# Patient Record
Sex: MALE | Race: WHITE | NOT HISPANIC OR LATINO | Employment: UNEMPLOYED | URBAN - METROPOLITAN AREA
[De-identification: names, ages, dates, MRNs, and addresses within clinical notes are randomized per-mention and may not be internally consistent; named-entity substitution may affect disease eponyms.]

---

## 2017-07-24 RX ORDER — LISINOPRIL 20 MG/1
TABLET ORAL
Qty: 30 TAB | Refills: 0 | Status: SHIPPED | OUTPATIENT
Start: 2017-07-24 | End: 2017-08-17 | Stop reason: SDUPTHER

## 2017-08-21 RX ORDER — LISINOPRIL 20 MG/1
TABLET ORAL
Qty: 30 TAB | Refills: 0 | Status: SHIPPED | OUTPATIENT
Start: 2017-08-21 | End: 2017-08-23 | Stop reason: SDUPTHER

## 2017-08-23 ENCOUNTER — OFFICE VISIT (OUTPATIENT)
Dept: MEDICAL GROUP | Facility: MEDICAL CENTER | Age: 52
End: 2017-08-23
Attending: FAMILY MEDICINE
Payer: MEDICAID

## 2017-08-23 VITALS
HEIGHT: 67 IN | TEMPERATURE: 98.2 F | RESPIRATION RATE: 16 BRPM | WEIGHT: 224 LBS | DIASTOLIC BLOOD PRESSURE: 72 MMHG | BODY MASS INDEX: 35.16 KG/M2 | HEART RATE: 100 BPM | OXYGEN SATURATION: 93 % | SYSTOLIC BLOOD PRESSURE: 104 MMHG

## 2017-08-23 DIAGNOSIS — E78.5 HYPERLIPIDEMIA, UNSPECIFIED HYPERLIPIDEMIA TYPE: ICD-10-CM

## 2017-08-23 DIAGNOSIS — Z12.5 SCREENING PSA (PROSTATE SPECIFIC ANTIGEN): ICD-10-CM

## 2017-08-23 DIAGNOSIS — R73.9 HYPERGLYCEMIA: ICD-10-CM

## 2017-08-23 DIAGNOSIS — E55.9 VITAMIN D DEFICIENCY DISEASE: ICD-10-CM

## 2017-08-23 DIAGNOSIS — I10 ESSENTIAL HYPERTENSION: ICD-10-CM

## 2017-08-23 PROBLEM — E66.9 OBESITY (BMI 30-39.9): Status: ACTIVE | Noted: 2017-08-23

## 2017-08-23 PROCEDURE — 99213 OFFICE O/P EST LOW 20 MIN: CPT | Performed by: FAMILY MEDICINE

## 2017-08-23 PROCEDURE — 99214 OFFICE O/P EST MOD 30 MIN: CPT | Performed by: FAMILY MEDICINE

## 2017-08-23 RX ORDER — ERGOCALCIFEROL 1.25 MG/1
50000 CAPSULE ORAL
Qty: 3 CAP | Refills: 3 | Status: SHIPPED | OUTPATIENT
Start: 2017-08-23 | End: 2017-10-23 | Stop reason: SDUPTHER

## 2017-08-23 RX ORDER — LISINOPRIL 20 MG/1
TABLET ORAL
Qty: 30 TAB | Refills: 0 | Status: SHIPPED | OUTPATIENT
Start: 2017-08-23 | End: 2017-10-22 | Stop reason: SDUPTHER

## 2017-08-23 ASSESSMENT — ENCOUNTER SYMPTOMS
HEADACHES: 0
ABDOMINAL PAIN: 0
FEVER: 0
VOMITING: 0
COUGH: 0
SPUTUM PRODUCTION: 0
CHILLS: 0
NAUSEA: 0
SHORTNESS OF BREATH: 0
EYES NEGATIVE: 1
PALPITATIONS: 0

## 2017-08-23 NOTE — MR AVS SNAPSHOT
"        Ta Stokes   2017 4:50 PM   Office Visit   MRN: [id number]    [facility]   Dept Phone:  [phone]    Description:  Male : 1965   Provider:  Glenroy Cody M.D.           Reason for Visit     Orders Needed nocturnal O2 renewal      Allergies as of 2017     No Known Allergies      You were diagnosed with     BMI 34.0-34.9,adult   [541538]       Essential hypertension   [1664542]       Hyperglycemia   [992247]       Hyperlipidemia, unspecified hyperlipidemia type   [1873414]       Vitamin D deficiency disease   [316880]       Screening PSA (prostate specific antigen)   [179693]         Vital Signs     Blood Pressure Pulse Temperature Respirations Height Weight    104/72 mmHg 100 36.8 °C (98.2 °F) 16 1.714 m (5' 7.48\") 101.606 kg (224 lb)    Body Mass Index Oxygen Saturation Smoking Status             34.59 kg/m2 93% Current Every Day Smoker         Basic Information     Date Of Birth Sex Race Ethnicity Preferred Language    1965 Male White Non- English      Problem List              ICD-10-CM Priority Class Noted - Resolved    Essential hypertension I10   2016 - Present    Hyperglycemia R73.9   2016 - Present    Nocturnal hypoxia G47.34   2016 - Present    Urinary urgency R39.15   2016 - Present    Vitamin D deficiency disease E55.9   12/15/2016 - Present    Hyperlipidemia E78.5   12/15/2016 - Present    Obesity (BMI 30-39.9) E66.9   2017 - Present      Health Maintenance        Date Due Completion Dates    COLONOSCOPY 3/14/2016 ---    IMM INFLUENZA (1) 2017 10/13/2016    IMM DTaP/Tdap/Td Vaccine (2 - Td) 12/15/2026 12/15/2016            Current Immunizations     Influenza Vaccine Quad Inj (Preserved) 10/13/2016    Tdap Vaccine 12/15/2016      Below and/or attached are the medications your provider expects you to take. Review all of your home medications and newly ordered medications with your provider and/or pharmacist. Follow " medication instructions as directed by your provider and/or pharmacist. Please keep your medication list with you and share with your provider. Update the information when medications are discontinued, doses are changed, or new medications (including over-the-counter products) are added; and carry medication information at all times in the event of emergency situations     Allergies:  No Known Allergies          Medications  Valid as of: August 23, 2017 -  5:27 PM    Generic Name Brand Name Tablet Size Instructions for use    Aspirin   Take  by mouth.        Cyclobenzaprine HCl (Tab) FLEXERIL 10 MG Take 10 mg by mouth 3 times a day as needed.        Ergocalciferol (Cap) DRISDOL 84198 units Take 1 Cap by mouth Q30 DAYS.        Lisinopril (Tab) PRINIVIL 20 MG TAKE ONE TABLET BY MOUTH ONCE DAILY        Misc. Devices (Misc) Misc. Devices  Overnight oximetry to recheck nocturnal hypoxia, concentrator to keep O2 above 90%        Misc. Devices (Misc) Misc. Devices  Overnight oximetry, O2 concentrator to keep nocturnal O2 sats > 92%        Nicotine (PATCH 24 HR) NICODERM 14 MG/24HR Apply 1 Patch to skin as directed every 24 hours.        Nicotine (PATCH 24 HR) NICODERM 21 MG/24HR Apply 1 Patch to skin as directed every 24 hours.        Nicotine (PATCH 24 HR) NICODERM 7 MG/24HR Apply 1 Patch to skin as directed every 24 hours.        .                 Medicines prescribed today were sent to:     [facility] [address] [place] [id number]    Phone: [phone] Fax: [phone]    Open 24 Hours?: No      Medication refill instructions:       If your prescription bottle indicates you have medication refills left, it is not necessary to call your provider’s office. Please contact your pharmacy and they will refill your medication.    If your prescription bottle indicates you do not have any refills left, you may request refills at any time through one of the following  ways: The online [employer] system (except Urgent Care), by calling your provider’s office, or by asking your pharmacy to contact your provider’s office with a refill request. Medication refills are processed only during regular business hours and may not be available until the next business day. Your provider may request additional information or to have a follow-up visit with you prior to refilling your medication.   *Please Note: Medication refills are assigned a new Rx number when refilled electronically. Your pharmacy may indicate that no refills were authorized even though a new prescription for the same medication is available at the pharmacy. Please request the medicine by name with the pharmacy before contacting your provider for a refill.        Your To Do List     Future Labs/Procedures Complete By Expires    CBC WITH DIFFERENTIAL  As directed 8/23/2018    COMP METABOLIC PANEL  As directed 8/23/2018    LIPID PROFILE  As directed 8/23/2018    PROSTATE SPECIFIC AG SCREENING  As directed 8/23/2018    TSH WITH REFLEX TO FT4  As directed 8/23/2018    VITAMIN D,25 HYDROXY  As directed 8/23/2018      Referral     A referral request has been sent to our patient care coordination department. Please allow 3-5 business days for us to process this request and contact you either by phone or mail. If you do not hear from us by the 5th business day, please call us at [phone].           [employer] Access Code: Activation code not generated  Current [employer] Status: Active          Quit Tobacco Information     Do you want to quit using tobacco?    Quitting tobacco decreases risks of cancer, heart and lung disease, increases life expectancy, improves sense of taste and smell, and increases spending money, among other benefits.    If you are thinking about quitting, we can help.  • [facility] Quit Tobacco Program: [phone]  o Program occurs weekly for four weeks and includes pharmacist consultation on products to support  quitting smoking or chewing tobacco. A provider referral is needed for pharmacist consultation.  • Tobacco Users Help Hotline: 3-143-QUIT-NOW (737-1124) or https://[place].quitlogix.org/  o Free, confidential telephone and online coaching for [place] residents. Sessions are designed on a schedule that is convenient for you. Eligible clients receive free nicotine replacement therapy.  • Nationally: www.smokefree.gov  o Information and professional assistance to support both immediate and long-term needs as you become, and remain, a non-smoker. Smokefree.gov allows you to choose the help that best fits your needs.

## 2017-08-24 NOTE — PROGRESS NOTES
Subjective:      Ta Chris Stokes is a 52 y.o. male who presents with Orders Needed            HPI Comments: Patient 52-year-old male here to reestablish with the clinic today. He has a history of hypertension, nocturnal hypoxemia, vitamin D deficiency, hyperlipidemia, and hyperglycemia.    Patient appears well-controlled on his blood pressure today. He has been taking his medication as directed. He will need his blood pressure medications refilled today. He is not having any chest pain or shortness of breath or other neurologic changes. Patient has been advised to check his blood pressure at home and keep notes. ER precautions have also been given to patient.    We'll reorder blood work to recheck his lipid panel, metabolic panel, thyroid function and complete blood count. He also has a history of vitamin D deficiency so will order a vitamin D level to recheck his levels. We'll also have him continue to use his vitamin D supplements as directed. We'll continue to follow.    Patient states that he had been following up with his sleep specialist. But his sleep specialist recently closed his practice. So he will need to be referred to another specialist. Patient states that he needs in order to continue his nocturnal concentrator. We'll also order a overnight oximetry study to recheck his oxygen levels nocturnally. We'll continue to follow.    Patient also reports that he has stopped smoking and is no longer using his nicotine patches. We'll continue to follow.     Current medications, allergies, and problem list reviewed with patient and updated in EPIC.          Review of Systems   Constitutional: Negative for fever and chills.   HENT: Negative for hearing loss.    Eyes: Negative.    Respiratory: Negative for cough, sputum production and shortness of breath.    Cardiovascular: Negative for chest pain and palpitations.   Gastrointestinal: Negative for nausea, vomiting and abdominal pain.   Neurological: Negative for  "headaches.          Objective:     /72 mmHg  Pulse 100  Temp(Src) 36.8 °C (98.2 °F)  Resp 16  Ht 1.714 m (5' 7.48\")  Wt 101.606 kg (224 lb)  BMI 34.59 kg/m2  SpO2 93%     Physical Exam   Constitutional: He is oriented to person, place, and time. He appears well-developed and well-nourished.   HENT:   Right Ear: External ear normal.   Left Ear: External ear normal.   Nose: Nose normal.   Mouth/Throat: Oropharynx is clear and moist.   Eyes: EOM are normal. Pupils are equal, round, and reactive to light.   Neck: Normal range of motion.   Cardiovascular: Normal rate, regular rhythm and normal heart sounds.  Exam reveals no friction rub.    No murmur heard.  Pulmonary/Chest: Effort normal and breath sounds normal. No respiratory distress. He has no wheezes. He has no rales.   Abdominal: Soft. Bowel sounds are normal.   Neurological: He is alert and oriented to person, place, and time.   Skin: Skin is warm and dry.   Psychiatric: He has a normal mood and affect. His behavior is normal.               Assessment/Plan:     1. BMI 34.0-34.9,adult  Discussed diet and exercise to help manage his BMI, blood sugars and cholesterol levels. We'll continue to follow.  - Patient identified as having weight management issue.  Appropriate orders and counseling given.  - REFERRAL TO SLEEP STUDIES    2. Essential hypertension  We'll refill his medication today. Have him continue to take it as directed. He has been advised to monitor blood pressure at home and keep notes. If blood pressure elevated or having symptoms of CP, SOB or neurologic changes to go to the er.    - REFERRAL TO SLEEP STUDIES  - TSH WITH REFLEX TO FT4; Future  - COMP METABOLIC PANEL; Future  - LIPID PROFILE; Future  - CBC WITH DIFFERENTIAL; Future    3. Hyperglycemia  We'll recheck his metabolic function as well as thyroid function and will continue to follow.  - TSH WITH REFLEX TO FT4; Future  - COMP METABOLIC PANEL; Future  - LIPID PROFILE; Future  - " CBC WITH DIFFERENTIAL; Future  - vitamin D, Ergocalciferol, (DRISDOL) 58519 units Cap capsule; Take 1 Cap by mouth Q30 DAYS.  Dispense: 3 Cap; Refill: 3    4. Hyperlipidemia, unspecified hyperlipidemia type  He has been advised to increase the fibers in his diet, avoid fatty/fried foods. Also advised to exercise as tolerated.     - TSH WITH REFLEX TO FT4; Future  - COMP METABOLIC PANEL; Future  - LIPID PROFILE; Future  - CBC WITH DIFFERENTIAL; Future    5. Vitamin D deficiency disease  We'll have him continue to use his vitamin D as directed. We'll recheck his vitamin D levels will continue to follow.  - VITAMIN D,25 HYDROXY; Future    6. Screening PSA (prostate specific antigen)  We'll reorder his PSA screen. Discussed referral to urology if his PSA levels are elevated.  - PROSTATE SPECIFIC AG SCREENING; Future

## 2017-08-30 ENCOUNTER — HOSPITAL ENCOUNTER (OUTPATIENT)
Dept: LAB | Facility: MEDICAL CENTER | Age: 52
End: 2017-08-30
Attending: FAMILY MEDICINE
Payer: MEDICAID

## 2017-08-30 DIAGNOSIS — R73.9 HYPERGLYCEMIA: ICD-10-CM

## 2017-08-30 DIAGNOSIS — I10 ESSENTIAL HYPERTENSION: ICD-10-CM

## 2017-08-30 DIAGNOSIS — Z12.5 SCREENING PSA (PROSTATE SPECIFIC ANTIGEN): ICD-10-CM

## 2017-08-30 DIAGNOSIS — E78.5 HYPERLIPIDEMIA, UNSPECIFIED HYPERLIPIDEMIA TYPE: ICD-10-CM

## 2017-08-30 DIAGNOSIS — E55.9 VITAMIN D DEFICIENCY DISEASE: ICD-10-CM

## 2017-08-30 LAB
ALBUMIN SERPL BCP-MCNC: 4.1 G/DL (ref 3.2–4.9)
ALBUMIN/GLOB SERPL: 1.5 G/DL
ALP SERPL-CCNC: 52 U/L (ref 30–99)
ALT SERPL-CCNC: 25 U/L (ref 2–50)
ANION GAP SERPL CALC-SCNC: 9 MMOL/L (ref 0–11.9)
AST SERPL-CCNC: 16 U/L (ref 12–45)
BASOPHILS # BLD AUTO: 0.5 % (ref 0–1.8)
BASOPHILS # BLD: 0.03 K/UL (ref 0–0.12)
BILIRUB SERPL-MCNC: 0.7 MG/DL (ref 0.1–1.5)
BUN SERPL-MCNC: 19 MG/DL (ref 8–22)
CALCIUM SERPL-MCNC: 9.3 MG/DL (ref 8.5–10.5)
CHLORIDE SERPL-SCNC: 103 MMOL/L (ref 96–112)
CHOLEST SERPL-MCNC: 189 MG/DL (ref 100–199)
CO2 SERPL-SCNC: 23 MMOL/L (ref 20–33)
CREAT SERPL-MCNC: 0.92 MG/DL (ref 0.5–1.4)
EOSINOPHIL # BLD AUTO: 0.21 K/UL (ref 0–0.51)
EOSINOPHIL NFR BLD: 3.3 % (ref 0–6.9)
ERYTHROCYTE [DISTWIDTH] IN BLOOD BY AUTOMATED COUNT: 44.1 FL (ref 35.9–50)
GFR SERPL CREATININE-BSD FRML MDRD: >60 ML/MIN/1.73 M 2
GLOBULIN SER CALC-MCNC: 2.7 G/DL (ref 1.9–3.5)
GLUCOSE SERPL-MCNC: 103 MG/DL (ref 65–99)
HCT VFR BLD AUTO: 48.5 % (ref 42–52)
HDLC SERPL-MCNC: 45 MG/DL
HGB BLD-MCNC: 15.9 G/DL (ref 14–18)
IMM GRANULOCYTES # BLD AUTO: 0.02 K/UL (ref 0–0.11)
IMM GRANULOCYTES NFR BLD AUTO: 0.3 % (ref 0–0.9)
LDLC SERPL CALC-MCNC: 114 MG/DL
LYMPHOCYTES # BLD AUTO: 2.43 K/UL (ref 1–4.8)
LYMPHOCYTES NFR BLD: 38.2 % (ref 22–41)
MCH RBC QN AUTO: 29.7 PG (ref 27–33)
MCHC RBC AUTO-ENTMCNC: 32.8 G/DL (ref 33.7–35.3)
MCV RBC AUTO: 90.5 FL (ref 81.4–97.8)
MONOCYTES # BLD AUTO: 0.41 K/UL (ref 0–0.85)
MONOCYTES NFR BLD AUTO: 6.4 % (ref 0–13.4)
NEUTROPHILS # BLD AUTO: 3.26 K/UL (ref 1.82–7.42)
NEUTROPHILS NFR BLD: 51.3 % (ref 44–72)
NRBC # BLD AUTO: 0 K/UL
NRBC BLD AUTO-RTO: 0 /100 WBC
PLATELET # BLD AUTO: 231 K/UL (ref 164–446)
PMV BLD AUTO: 9.8 FL (ref 9–12.9)
POTASSIUM SERPL-SCNC: 4.4 MMOL/L (ref 3.6–5.5)
PROT SERPL-MCNC: 6.8 G/DL (ref 6–8.2)
RBC # BLD AUTO: 5.36 M/UL (ref 4.7–6.1)
SODIUM SERPL-SCNC: 135 MMOL/L (ref 135–145)
TRIGL SERPL-MCNC: 150 MG/DL (ref 0–149)
WBC # BLD AUTO: 6.4 K/UL (ref 4.8–10.8)

## 2017-08-30 PROCEDURE — 84443 ASSAY THYROID STIM HORMONE: CPT

## 2017-08-30 PROCEDURE — 82306 VITAMIN D 25 HYDROXY: CPT

## 2017-08-30 PROCEDURE — 84153 ASSAY OF PSA TOTAL: CPT

## 2017-08-30 PROCEDURE — 36415 COLL VENOUS BLD VENIPUNCTURE: CPT

## 2017-08-30 PROCEDURE — 80053 COMPREHEN METABOLIC PANEL: CPT

## 2017-08-30 PROCEDURE — 85025 COMPLETE CBC W/AUTO DIFF WBC: CPT

## 2017-08-30 PROCEDURE — 80061 LIPID PANEL: CPT

## 2017-08-31 LAB
25(OH)D3 SERPL-MCNC: 30 NG/ML (ref 30–100)
PSA SERPL-MCNC: 1.81 NG/ML (ref 0–4)
TSH SERPL DL<=0.005 MIU/L-ACNC: 2.69 UIU/ML (ref 0.3–3.7)

## 2017-09-06 ENCOUNTER — OFFICE VISIT (OUTPATIENT)
Dept: MEDICAL GROUP | Facility: MEDICAL CENTER | Age: 52
End: 2017-09-06
Attending: FAMILY MEDICINE
Payer: MEDICAID

## 2017-09-06 VITALS
HEIGHT: 67 IN | HEART RATE: 88 BPM | RESPIRATION RATE: 16 BRPM | BODY MASS INDEX: 35.79 KG/M2 | WEIGHT: 228 LBS | SYSTOLIC BLOOD PRESSURE: 115 MMHG | OXYGEN SATURATION: 94 % | DIASTOLIC BLOOD PRESSURE: 66 MMHG | TEMPERATURE: 98.3 F

## 2017-09-06 DIAGNOSIS — G47.34 NOCTURNAL HYPOXIA: ICD-10-CM

## 2017-09-06 DIAGNOSIS — Z23 NEED FOR PNEUMOCOCCAL VACCINATION: ICD-10-CM

## 2017-09-06 DIAGNOSIS — E78.5 HYPERLIPIDEMIA, UNSPECIFIED HYPERLIPIDEMIA TYPE: ICD-10-CM

## 2017-09-06 DIAGNOSIS — I10 ESSENTIAL HYPERTENSION: ICD-10-CM

## 2017-09-06 DIAGNOSIS — Z23 NEED FOR INFLUENZA VACCINATION: ICD-10-CM

## 2017-09-06 DIAGNOSIS — E55.9 VITAMIN D DEFICIENCY DISEASE: ICD-10-CM

## 2017-09-06 PROCEDURE — 90686 IIV4 VACC NO PRSV 0.5 ML IM: CPT | Performed by: FAMILY MEDICINE

## 2017-09-06 PROCEDURE — 99214 OFFICE O/P EST MOD 30 MIN: CPT | Mod: 25 | Performed by: FAMILY MEDICINE

## 2017-09-06 PROCEDURE — 90732 PPSV23 VACC 2 YRS+ SUBQ/IM: CPT | Performed by: FAMILY MEDICINE

## 2017-09-06 PROCEDURE — 99213 OFFICE O/P EST LOW 20 MIN: CPT | Performed by: FAMILY MEDICINE

## 2017-09-06 PROCEDURE — 90471 IMMUNIZATION ADMIN: CPT | Performed by: FAMILY MEDICINE

## 2017-09-06 ASSESSMENT — ENCOUNTER SYMPTOMS
PALPITATIONS: 0
ABDOMINAL PAIN: 0
SHORTNESS OF BREATH: 0
SPUTUM PRODUCTION: 0
FEVER: 0
CHILLS: 0
NAUSEA: 0
VOMITING: 0
HEADACHES: 0
COUGH: 0

## 2017-09-06 NOTE — PROGRESS NOTES
"Subjective:      Ta Stokes is a 52 y.o. male who presents with Results (labs) and Flu Vaccine            Patient here for follow-up of recent blood work. Has a history of hyperlipidemia, hypertension, nocturnal hypoxia and vitamin D deficiency.    The results of his recent blood work showed his cholesterol to be slightly elevated but improved from his previous test. The results are as follows:    Cholesterol,Tot: 189  Triglycerides: 150 (H)  HDL: 45  LDL: 114 (H)    Discussed diet and exercise to help manage his cholesterol as well as his weight. Discussed low-fat diet with high-fiber and exercising as tolerated at least 20-30 minutes at a time 4-5 times weekly. To do to follow.    We'll have patient continue to take his blood pressure medication as directed. He is not having any chest pain or shortness of breath. We'll have him check his blood pressures at home and keep records. We'll continue to follow.    We'll have him continue to take his vitamin D as directed. His last vitamin D level was at 30. I will continue to follow.    In order for an overnight oximetry test had been sent to his [employer] supplier. He was never contacted and never received the test yet so an order will be resent. We'll continue to follow.     Current medications, allergies, and problem list reviewed with patient and updated in [employer].                Review of Systems   Constitutional: Negative for chills and fever.   HENT: Negative for hearing loss.    Respiratory: Negative for cough, sputum production and shortness of breath.    Cardiovascular: Negative for chest pain and palpitations.   Gastrointestinal: Negative for abdominal pain, nausea and vomiting.   Neurological: Negative for headaches.          Objective:     /66   Pulse 88   Temp 36.8 °C (98.3 °F)   Resp 16   Ht 1.714 m (5' 7.48\")   Wt 103.4 kg (228 lb)   SpO2 94%   BMI 35.20 kg/m²      Physical Exam   Constitutional: He is oriented to person, " place, and time. He appears well-developed and well-nourished.   HENT:   Head: Normocephalic and atraumatic.   Nose: Nose normal.   Mouth/Throat: Oropharynx is clear and moist.   Neck: No thyromegaly present.   Cardiovascular: Normal rate, regular rhythm and normal heart sounds.  Exam reveals no friction rub.    No murmur heard.  Pulmonary/Chest: Effort normal and breath sounds normal. No respiratory distress. He has no wheezes.   Abdominal: Soft. Bowel sounds are normal. He exhibits no distension. There is no tenderness. There is no guarding.   Neurological: He is alert and oriented to person, place, and time.   Skin: Skin is warm and dry.   Psychiatric: He has a normal mood and affect. His behavior is normal.   Nursing note and vitals reviewed.              Assessment/Plan:     1. Need for pneumococcal vaccination  We'll have patient get a pneumococcal vaccine today. Risks and benefits of the vaccine have been discussed with patient.  - Pneumococal Polysaccharide Vaccine 23-Valent =>1yo SQ/IM    2. Need for influenza vaccination  He would like to get a flu shot, he understands the risks and benefits of the flu shot. No recent fevers, no egg allergies, and no hx of GBS.    - Flu Quad Inj >3 Year Pre-Filled (Preservative Free)    3. Hyperlipidemia, unspecified hyperlipidemia type  Discussed the results of his recent lipid panel. He has been advised to increase the fibers in his diet, avoid fatty/fried foods. Also advised to exercise as tolerated.       4. Essential hypertension  Will have patient patient continue to take his medication as directed. He has been advised to monitor blood pressure at home and keep notes. If blood pressure elevated or having symptoms of CP, SOB or neurologic changes to go to the er.      5. Vitamin D deficiency disease  We'll have patient continue to take his vitamin D as directed. We'll continue to monitor.    6. Nocturnal hypoxia  We'll resend an order for an overnight oximetry study.  CONTINUE to follow.

## 2017-10-23 DIAGNOSIS — R73.9 HYPERGLYCEMIA: ICD-10-CM

## 2017-10-23 RX ORDER — LISINOPRIL 20 MG/1
TABLET ORAL
Qty: 90 TAB | Refills: 1 | Status: SHIPPED | OUTPATIENT
Start: 2017-10-23 | End: 2017-10-24 | Stop reason: SDUPTHER

## 2017-10-23 RX ORDER — ERGOCALCIFEROL 1.25 MG/1
50000 CAPSULE ORAL
Qty: 3 CAP | Refills: 3 | Status: SHIPPED | OUTPATIENT
Start: 2017-10-23 | End: 2017-10-24 | Stop reason: SDUPTHER

## 2017-10-24 DIAGNOSIS — R73.9 HYPERGLYCEMIA: ICD-10-CM

## 2017-10-24 RX ORDER — LISINOPRIL 20 MG/1
TABLET ORAL
Qty: 90 TAB | Refills: 1 | Status: SHIPPED | OUTPATIENT
Start: 2017-10-24 | End: 2018-04-09 | Stop reason: SDUPTHER

## 2017-10-24 RX ORDER — ERGOCALCIFEROL 1.25 MG/1
50000 CAPSULE ORAL
Qty: 3 CAP | Refills: 3 | Status: SHIPPED | OUTPATIENT
Start: 2017-10-24 | End: 2018-04-18 | Stop reason: SDUPTHER

## 2018-04-09 ENCOUNTER — TELEPHONE (OUTPATIENT)
Dept: MEDICAL GROUP | Facility: MEDICAL CENTER | Age: 53
End: 2018-04-09

## 2018-04-09 DIAGNOSIS — I10 ESSENTIAL HYPERTENSION: ICD-10-CM

## 2018-04-09 RX ORDER — LISINOPRIL 20 MG/1
TABLET ORAL
Qty: 90 TAB | Refills: 1 | Status: SHIPPED | OUTPATIENT
Start: 2018-04-09 | End: 2018-04-18 | Stop reason: SDUPTHER

## 2018-04-18 ENCOUNTER — PATIENT MESSAGE (OUTPATIENT)
Dept: MEDICAL GROUP | Facility: MEDICAL CENTER | Age: 53
End: 2018-04-18

## 2018-04-18 DIAGNOSIS — I10 ESSENTIAL HYPERTENSION: ICD-10-CM

## 2018-04-18 DIAGNOSIS — R73.9 HYPERGLYCEMIA: ICD-10-CM

## 2018-04-18 RX ORDER — ERGOCALCIFEROL 1.25 MG/1
50000 CAPSULE ORAL
Qty: 3 CAP | Refills: 3 | Status: SHIPPED | OUTPATIENT
Start: 2018-04-18 | End: 2018-09-24 | Stop reason: SDUPTHER

## 2018-04-18 RX ORDER — LISINOPRIL 20 MG/1
TABLET ORAL
Qty: 90 TAB | Refills: 1 | Status: SHIPPED | OUTPATIENT
Start: 2018-04-18 | End: 2018-09-24 | Stop reason: SDUPTHER

## 2018-09-24 ENCOUNTER — OFFICE VISIT (OUTPATIENT)
Dept: MEDICAL GROUP | Facility: MEDICAL CENTER | Age: 53
End: 2018-09-24
Attending: FAMILY MEDICINE
Payer: MEDICAID

## 2018-09-24 VITALS
DIASTOLIC BLOOD PRESSURE: 80 MMHG | HEIGHT: 67 IN | SYSTOLIC BLOOD PRESSURE: 125 MMHG | OXYGEN SATURATION: 94 % | BODY MASS INDEX: 35.31 KG/M2 | HEART RATE: 96 BPM | TEMPERATURE: 98.5 F | RESPIRATION RATE: 16 BRPM | WEIGHT: 225 LBS

## 2018-09-24 DIAGNOSIS — E78.5 HYPERLIPIDEMIA, UNSPECIFIED HYPERLIPIDEMIA TYPE: ICD-10-CM

## 2018-09-24 DIAGNOSIS — I10 ESSENTIAL HYPERTENSION: ICD-10-CM

## 2018-09-24 DIAGNOSIS — E55.9 VITAMIN D DEFICIENCY DISEASE: ICD-10-CM

## 2018-09-24 DIAGNOSIS — Z12.5 SCREENING PSA (PROSTATE SPECIFIC ANTIGEN): ICD-10-CM

## 2018-09-24 DIAGNOSIS — R73.9 HYPERGLYCEMIA: ICD-10-CM

## 2018-09-24 DIAGNOSIS — Z23 FLU VACCINE NEED: ICD-10-CM

## 2018-09-24 DIAGNOSIS — Z12.11 COLON CANCER SCREENING: ICD-10-CM

## 2018-09-24 PROCEDURE — 99214 OFFICE O/P EST MOD 30 MIN: CPT | Performed by: FAMILY MEDICINE

## 2018-09-24 PROCEDURE — 99212 OFFICE O/P EST SF 10 MIN: CPT | Performed by: FAMILY MEDICINE

## 2018-09-24 PROCEDURE — 90686 IIV4 VACC NO PRSV 0.5 ML IM: CPT

## 2018-09-24 RX ORDER — LISINOPRIL 20 MG/1
TABLET ORAL
Qty: 90 TAB | Refills: 1 | Status: SHIPPED | OUTPATIENT
Start: 2018-09-24 | End: 2019-04-15 | Stop reason: SDUPTHER

## 2018-09-24 RX ORDER — ERGOCALCIFEROL 1.25 MG/1
50000 CAPSULE ORAL
Qty: 3 CAP | Refills: 3 | Status: SHIPPED | OUTPATIENT
Start: 2018-09-24 | End: 2019-09-05 | Stop reason: SDUPTHER

## 2018-09-24 ASSESSMENT — ENCOUNTER SYMPTOMS
VOMITING: 0
PSYCHIATRIC NEGATIVE: 1
NAUSEA: 0
ABDOMINAL PAIN: 0
MUSCULOSKELETAL NEGATIVE: 1
SPUTUM PRODUCTION: 0
CHILLS: 0
NEUROLOGICAL NEGATIVE: 1
PALPITATIONS: 0
FEVER: 0
SHORTNESS OF BREATH: 0
COUGH: 0

## 2018-09-24 ASSESSMENT — PATIENT HEALTH QUESTIONNAIRE - PHQ9: CLINICAL INTERPRETATION OF PHQ2 SCORE: 0

## 2018-09-24 NOTE — PROGRESS NOTES
Subjective:      aT Stokes is a 53 y.o. male who presents with Medication Refill (vitamin d, lisinopril) and Immunizations (flu shot)            Patient 53-year-old male here for a follow-up of his hypertension, vitamin D deficiency, hyperglycemia, hyperlipidemia and elevated BMI.    He has been taking his blood pressure medication as directed and his blood pressure appears to be well managed with his current medication. He is not having any chest pain, shortness of breath or other neurologic or cardiac symptoms. We will have him continue to check his blood pressure at home and keep notes.    His last cholesterol was mostly within normal limits. He had a normal total cholesterol that is slightly elevated LDL cholesterol level. Will have him continue to watch his diet avoiding fatty and fried foods. Also recommended exercising 4-5 times weekly for 20-30 minutes at a time. We'll continue to follow.    Will reorder blood work to recheck his metabolic function, lipid panel, complete blood count and vitamin D level.  His last PSA was slightly elevated but below the upper limits of normal. We'll continue to follow.    A fit screen was also ordered for patient today. Discussed the need for a colonoscopy if his fit screen is positive. We will continue to follow.    He'll also be getting his flu vaccine today. He is not allergic to eggs has no history of GBS and no recent fevers. We'll continue to follow.     Current medications, allergies, and problem list reviewed with patient and updated in EPIC.          Review of Systems   Constitutional: Negative for chills and fever.   HENT: Negative for hearing loss and tinnitus.    Respiratory: Negative for cough, sputum production and shortness of breath.    Cardiovascular: Negative for chest pain and palpitations.   Gastrointestinal: Negative for abdominal pain, nausea and vomiting.   Musculoskeletal: Negative.    Skin: Negative for rash.   Neurological: Negative.   "  Psychiatric/Behavioral: Negative.           Objective:     /80 (BP Location: Left arm, Patient Position: Sitting)   Pulse 96   Temp 36.9 °C (98.5 °F)   Resp 16   Ht 1.702 m (5' 7\")   Wt 102.1 kg (225 lb)   SpO2 94%   BMI 35.24 kg/m²      Physical Exam   Constitutional: He is oriented to person, place, and time.   BMI 35   HENT:   Head: Normocephalic and atraumatic.   Nose: Nose normal.   Mouth/Throat: Oropharynx is clear and moist.   Eyes: Pupils are equal, round, and reactive to light. Conjunctivae and EOM are normal.   Neck: Normal range of motion. Neck supple. No thyromegaly present.   Cardiovascular: Normal rate, regular rhythm and normal heart sounds.  Exam reveals no friction rub.    No murmur heard.  Pulmonary/Chest: Effort normal and breath sounds normal. No respiratory distress. He has no wheezes. He has no rales.   Abdominal: Soft. Bowel sounds are normal. He exhibits no distension. There is no tenderness.   Musculoskeletal: Normal range of motion.   Lymphadenopathy:     He has no cervical adenopathy.   Neurological: He is alert and oriented to person, place, and time.   Skin: Skin is dry.   Psychiatric: He has a normal mood and affect. His behavior is normal.   Nursing note and vitals reviewed.              Assessment/Plan:     1. Essential hypertension  Will send a refill of his medication to his pharmacy for him to continue taking his recommended. He has been advised to monitor blood pressure at home and keep notes. If blood pressure elevated or having symptoms of CP, SOB or neurologic changes to go to the er.    - lisinopril (PRINIVIL) 20 MG Tab; TAKE ONE TABLET BY MOUTH ONCE DAILY  Dispense: 90 Tab; Refill: 1  - COMP METABOLIC PANEL; Future  - CBC WITH DIFFERENTIAL; Future    2. Hyperglycemia  Recheck his metabolic function. We'll continue to follow.  - vitamin D, Ergocalciferol, (DRISDOL) 93066 units Cap capsule; Take 1 Cap by mouth Q30 DAYS.  Dispense: 3 Cap; Refill: 3    3. " Hyperlipidemia, unspecified hyperlipidemia type  He has been advised to increase the fibers in his diet, avoid fatty/fried foods. Also advised to exercise as tolerated.     - COMP METABOLIC PANEL; Future  - LIPID PROFILE; Future    4. Screening PSA (prostate specific antigen)  Will order a PSA for further screening. We'll continue to follow.  - PROSTATE SPECIFIC AG SCREENING; Future    5. Vitamin D deficiency disease  Will have him continue to take his vitamin D supplements as directed. Will recheck his vitamin D levels. We'll continue to follow.  - VITAMIN D,25 HYDROXY; Future    6. BMI 35.0-35.9,adult  Diet and exercise discussed to manage his PMI.  - Patient identified as having weight management issue.  Appropriate orders and counseling given.    7. Colon cancer screening  Fit screen has been ordered for colon cancer screening. We'll continue to follow.  - OCCULT BLOOD FECES IMMUNOASSAY; Future

## 2018-09-27 ENCOUNTER — HOSPITAL ENCOUNTER (OUTPATIENT)
Facility: MEDICAL CENTER | Age: 53
End: 2018-09-27
Attending: FAMILY MEDICINE
Payer: MEDICAID

## 2018-09-27 PROCEDURE — 82274 ASSAY TEST FOR BLOOD FECAL: CPT

## 2018-09-29 DIAGNOSIS — Z12.11 COLON CANCER SCREENING: ICD-10-CM

## 2018-10-01 LAB — HEMOCCULT STL QL IA: NEGATIVE

## 2018-10-02 ENCOUNTER — HOSPITAL ENCOUNTER (OUTPATIENT)
Dept: LAB | Facility: MEDICAL CENTER | Age: 53
End: 2018-10-02
Attending: FAMILY MEDICINE
Payer: MEDICAID

## 2018-10-02 DIAGNOSIS — Z12.5 SCREENING PSA (PROSTATE SPECIFIC ANTIGEN): ICD-10-CM

## 2018-10-02 DIAGNOSIS — I10 ESSENTIAL HYPERTENSION: ICD-10-CM

## 2018-10-02 DIAGNOSIS — E78.5 HYPERLIPIDEMIA, UNSPECIFIED HYPERLIPIDEMIA TYPE: ICD-10-CM

## 2018-10-02 DIAGNOSIS — E55.9 VITAMIN D DEFICIENCY DISEASE: ICD-10-CM

## 2018-10-02 LAB
25(OH)D3 SERPL-MCNC: 20 NG/ML (ref 30–100)
ALBUMIN SERPL BCP-MCNC: 3.7 G/DL (ref 3.2–4.9)
ALBUMIN/GLOB SERPL: 1.4 G/DL
ALP SERPL-CCNC: 53 U/L (ref 30–99)
ALT SERPL-CCNC: 26 U/L (ref 2–50)
ANION GAP SERPL CALC-SCNC: 7 MMOL/L (ref 0–11.9)
AST SERPL-CCNC: 16 U/L (ref 12–45)
BASOPHILS # BLD AUTO: 0.4 % (ref 0–1.8)
BASOPHILS # BLD: 0.02 K/UL (ref 0–0.12)
BILIRUB SERPL-MCNC: 0.6 MG/DL (ref 0.1–1.5)
BUN SERPL-MCNC: 18 MG/DL (ref 8–22)
CALCIUM SERPL-MCNC: 8.9 MG/DL (ref 8.5–10.5)
CHLORIDE SERPL-SCNC: 107 MMOL/L (ref 96–112)
CHOLEST SERPL-MCNC: 174 MG/DL (ref 100–199)
CO2 SERPL-SCNC: 24 MMOL/L (ref 20–33)
CREAT SERPL-MCNC: 0.88 MG/DL (ref 0.5–1.4)
EOSINOPHIL # BLD AUTO: 0.21 K/UL (ref 0–0.51)
EOSINOPHIL NFR BLD: 4.1 % (ref 0–6.9)
ERYTHROCYTE [DISTWIDTH] IN BLOOD BY AUTOMATED COUNT: 42.9 FL (ref 35.9–50)
FASTING STATUS PATIENT QL REPORTED: NORMAL
GLOBULIN SER CALC-MCNC: 2.7 G/DL (ref 1.9–3.5)
GLUCOSE SERPL-MCNC: 105 MG/DL (ref 65–99)
HCT VFR BLD AUTO: 43.7 % (ref 42–52)
HDLC SERPL-MCNC: 36 MG/DL
HGB BLD-MCNC: 14.7 G/DL (ref 14–18)
IMM GRANULOCYTES # BLD AUTO: 0.01 K/UL (ref 0–0.11)
IMM GRANULOCYTES NFR BLD AUTO: 0.2 % (ref 0–0.9)
LDLC SERPL CALC-MCNC: 117 MG/DL
LYMPHOCYTES # BLD AUTO: 2 K/UL (ref 1–4.8)
LYMPHOCYTES NFR BLD: 39 % (ref 22–41)
MCH RBC QN AUTO: 30.6 PG (ref 27–33)
MCHC RBC AUTO-ENTMCNC: 33.6 G/DL (ref 33.7–35.3)
MCV RBC AUTO: 90.9 FL (ref 81.4–97.8)
MONOCYTES # BLD AUTO: 0.33 K/UL (ref 0–0.85)
MONOCYTES NFR BLD AUTO: 6.4 % (ref 0–13.4)
NEUTROPHILS # BLD AUTO: 2.56 K/UL (ref 1.82–7.42)
NEUTROPHILS NFR BLD: 49.9 % (ref 44–72)
NRBC # BLD AUTO: 0 K/UL
NRBC BLD-RTO: 0 /100 WBC
PLATELET # BLD AUTO: 232 K/UL (ref 164–446)
PMV BLD AUTO: 9.9 FL (ref 9–12.9)
POTASSIUM SERPL-SCNC: 3.9 MMOL/L (ref 3.6–5.5)
PROT SERPL-MCNC: 6.4 G/DL (ref 6–8.2)
PSA SERPL-MCNC: 2.23 NG/ML (ref 0–4)
RBC # BLD AUTO: 4.81 M/UL (ref 4.7–6.1)
SODIUM SERPL-SCNC: 138 MMOL/L (ref 135–145)
TRIGL SERPL-MCNC: 103 MG/DL (ref 0–149)
WBC # BLD AUTO: 5.1 K/UL (ref 4.8–10.8)

## 2018-10-02 PROCEDURE — 82306 VITAMIN D 25 HYDROXY: CPT

## 2018-10-02 PROCEDURE — 80053 COMPREHEN METABOLIC PANEL: CPT

## 2018-10-02 PROCEDURE — 36415 COLL VENOUS BLD VENIPUNCTURE: CPT

## 2018-10-02 PROCEDURE — 80061 LIPID PANEL: CPT

## 2018-10-02 PROCEDURE — 85025 COMPLETE CBC W/AUTO DIFF WBC: CPT

## 2018-10-02 PROCEDURE — 84153 ASSAY OF PSA TOTAL: CPT

## 2018-11-15 ENCOUNTER — OFFICE VISIT (OUTPATIENT)
Dept: MEDICAL GROUP | Facility: MEDICAL CENTER | Age: 53
End: 2018-11-15
Attending: FAMILY MEDICINE
Payer: MEDICAID

## 2018-11-15 VITALS
HEIGHT: 67 IN | DIASTOLIC BLOOD PRESSURE: 70 MMHG | BODY MASS INDEX: 35.79 KG/M2 | OXYGEN SATURATION: 95 % | HEART RATE: 90 BPM | WEIGHT: 228 LBS | SYSTOLIC BLOOD PRESSURE: 105 MMHG | RESPIRATION RATE: 16 BRPM | TEMPERATURE: 98.6 F

## 2018-11-15 DIAGNOSIS — H53.19 VITREOUS FLASHES OF BOTH EYES: ICD-10-CM

## 2018-11-15 DIAGNOSIS — E78.5 HYPERLIPIDEMIA, UNSPECIFIED HYPERLIPIDEMIA TYPE: ICD-10-CM

## 2018-11-15 DIAGNOSIS — I10 ESSENTIAL HYPERTENSION: ICD-10-CM

## 2018-11-15 DIAGNOSIS — E55.9 VITAMIN D DEFICIENCY DISEASE: ICD-10-CM

## 2018-11-15 PROCEDURE — 99212 OFFICE O/P EST SF 10 MIN: CPT | Performed by: FAMILY MEDICINE

## 2018-11-15 PROCEDURE — 99214 OFFICE O/P EST MOD 30 MIN: CPT | Performed by: FAMILY MEDICINE

## 2018-11-15 ASSESSMENT — ENCOUNTER SYMPTOMS
MUSCULOSKELETAL NEGATIVE: 1
PHOTOPHOBIA: 0
COUGH: 0
PSYCHIATRIC NEGATIVE: 1
EYE PAIN: 0
PALPITATIONS: 0
VOMITING: 0
ABDOMINAL PAIN: 0
SHORTNESS OF BREATH: 0
CHILLS: 0
EYE DISCHARGE: 0
NAUSEA: 0
BLURRED VISION: 0
SPUTUM PRODUCTION: 0
FEVER: 0
NEUROLOGICAL NEGATIVE: 1
DOUBLE VISION: 0

## 2018-11-16 NOTE — PROGRESS NOTES
Subjective:      Ta Stokes is a 53 y.o. male who presents with Results (labs)            Patient 53-year-old male here for follow-up of his recent blood work.  He has a history of hypertension, vitamin D deficiency, elevated blood sugar and low HDL, with a high LDL level.    The results of his recent blood work are as follows:    10/2/2018 09:37  WBC: 5.1  RBC: 4.81  Hemoglobin: 14.7  Hematocrit: 43.7  MCV: 90.9  MCH: 30.6  MCHC: 33.6 (L)  RDW: 42.9  Platelet Count: 232  MPV: 9.9  Neutrophils-Polys: 49.90  Neutrophils (Absolute): 2.56  Lymphocytes: 39.00  Lymphs (Absolute): 2.00  Monocytes: 6.40  Monos (Absolute): 0.33  Eosinophils: 4.10  Eos (Absolute): 0.21  Basophils: 0.40  Baso (Absolute): 0.02  Immature Granulocytes: 0.20  Immature Granulocytes (abs): 0.01  Nucleated RBC: 0.00  NRBC (Absolute): 0.00  Sodium: 138  Potassium: 3.9  Chloride: 107  Co2: 24  Anion Gap: 7.0  Glucose: 105 (H)  Bun: 18  Creatinine: 0.88  GFR If : >60  GFR If Non : >60  Calcium: 8.9  AST(SGOT): 16  ALT(SGPT): 26  Alkaline Phosphatase: 53  Total Bilirubin: 0.6  Albumin: 3.7  Total Protein: 6.4  Globulin: 2.7  A-G Ratio: 1.4  Fasting Status: Fasting  Cholesterol,Tot: 174  Triglycerides: 103  HDL: 36 (A)  LDL: 117 (H)  Prostatic Specific Antigen Tot: 2.23  25-Hydroxy   Vitamin D 25: 20 (L)    His blood sugar today was slightly elevated at 105.  Discussed with patient fact that this is slightly elevated but not to the level of diabetes.  Also discussed with patient his LDL level being slightly high at 117 with a HDL level of 36.  Discussed diet as well as exercise as tolerated.  Recommended avoiding fatty and fried foods as well as increasing his fiber intake and eating low glycemic index foods.  Also recommended exercising 4-5 times weekly for 20-30 minutes at a time.  We will continue to follow.    He will continue to use his vitamin D supplements as directed.  His vitamin D levels are slightly low  "at 20.  We will continue to follow.    His PSA increased to 2.23 from a previous 1.81.  He has not been having any worsening issues with nocturia or weakened urine stream.  He does feel that he urinates more frequently during the day but his urine stream is normal in caliber.  We will have him continue to keep track of his urinating.  If his next PSA level is higher than his current will refer to urology for a further assessment.  We will continue to follow.    His blood pressure today appears to be slightly low but well within normal limits.  He is not having any chest pain, shortness of breath or other cardiac or neurologic symptoms.  We will have him continue to check his blood pressures at home and keep notes.  We will continue to follow.    He has been seeing flashes of light in his peripheral vision.  He had been seen by an optometrist and was told that he should be seeing a ophthalmologist for this problem.  Discussed the possibility of a retinal detachment associated with the flashes of light.  An ophthalmology referral will be made today.     Current medications, allergies, and problem list reviewed with patient and updated in EPIC.          Review of Systems   Constitutional: Negative for chills and fever.   HENT: Negative for hearing loss and tinnitus.    Eyes: Negative for blurred vision, double vision, photophobia, pain and discharge.        Flashes of light in peripheral vision   Respiratory: Negative for cough, sputum production and shortness of breath.    Cardiovascular: Negative for chest pain and palpitations.   Gastrointestinal: Negative for abdominal pain, nausea and vomiting.   Musculoskeletal: Negative.    Neurological: Negative.    Psychiatric/Behavioral: Negative.           Objective:     /70 (BP Location: Left arm, Patient Position: Sitting)   Pulse 90   Temp 37 °C (98.6 °F)   Resp 16   Ht 1.702 m (5' 7\")   Wt 103.4 kg (228 lb)   SpO2 95%   BMI 35.71 kg/m²      Physical Exam "   Constitutional: He is oriented to person, place, and time.   BMI 35   HENT:   Head: Normocephalic and atraumatic.   Eyes: Pupils are equal, round, and reactive to light. Conjunctivae and EOM are normal. Right eye exhibits no discharge. Left eye exhibits no discharge. No scleral icterus.   Neck: Normal range of motion. Neck supple.   Cardiovascular: Normal rate, regular rhythm and normal heart sounds.  Exam reveals no friction rub.    No murmur heard.  Pulmonary/Chest: Effort normal and breath sounds normal. No respiratory distress. He has no wheezes. He has no rales.   Abdominal: Soft. Bowel sounds are normal. He exhibits no distension. There is no tenderness.   Neurological: He is alert and oriented to person, place, and time.   Skin: Skin is warm and dry.   Psychiatric: He has a normal mood and affect. His behavior is normal.   Nursing note and vitals reviewed.              Assessment/Plan:     1. Vitreous flashes of both eyes  A referral to ophthalmology will be made for patient today.  We will continue to follow.  - REFERRAL TO OPHTHALMOLOGY    2. Vitamin D deficiency disease  We will have him continue to use his vitamin D as directed.  Reviewed the results of his recent blood work.  We will continue to follow.    3. Hyperlipidemia, unspecified hyperlipidemia type  He has been advised to increase the fibers in his diet, avoid fatty/fried foods. Also advised to exercise as tolerated.       4. Essential hypertension  We will have him continue to use his medication as directed. He has been advised to monitor blood pressure at home and keep notes. If blood pressure elevated or having symptoms of CP, SOB or neurologic changes to go to the er.

## 2019-02-01 ENCOUNTER — APPOINTMENT (OUTPATIENT)
Dept: RADIOLOGY | Facility: MEDICAL CENTER | Age: 54
End: 2019-02-01
Attending: EMERGENCY MEDICINE
Payer: MEDICAID

## 2019-02-01 ENCOUNTER — HOSPITAL ENCOUNTER (EMERGENCY)
Facility: MEDICAL CENTER | Age: 54
End: 2019-02-01
Attending: EMERGENCY MEDICINE
Payer: MEDICAID

## 2019-02-01 VITALS
HEART RATE: 81 BPM | SYSTOLIC BLOOD PRESSURE: 123 MMHG | WEIGHT: 227.96 LBS | OXYGEN SATURATION: 96 % | RESPIRATION RATE: 18 BRPM | DIASTOLIC BLOOD PRESSURE: 68 MMHG | TEMPERATURE: 97.9 F | BODY MASS INDEX: 35.7 KG/M2

## 2019-02-01 DIAGNOSIS — I10 ESSENTIAL HYPERTENSION: ICD-10-CM

## 2019-02-01 DIAGNOSIS — H66.003 ACUTE SUPPURATIVE OTITIS MEDIA OF BOTH EARS WITHOUT SPONTANEOUS RUPTURE OF TYMPANIC MEMBRANES, RECURRENCE NOT SPECIFIED: ICD-10-CM

## 2019-02-01 DIAGNOSIS — R07.9 ACUTE CHEST PAIN: ICD-10-CM

## 2019-02-01 DIAGNOSIS — R05.9 COUGH: ICD-10-CM

## 2019-02-01 DIAGNOSIS — S30.1XXA ABDOMINAL WALL HEMATOMA, INITIAL ENCOUNTER: ICD-10-CM

## 2019-02-01 DIAGNOSIS — K76.89 HEPATIC CYST: ICD-10-CM

## 2019-02-01 LAB
ABO GROUP BLD: NORMAL
ABO GROUP BLD: NORMAL
ALBUMIN SERPL BCP-MCNC: 3.9 G/DL (ref 3.2–4.9)
ALBUMIN/GLOB SERPL: 1.4 G/DL
ALP SERPL-CCNC: 56 U/L (ref 30–99)
ALT SERPL-CCNC: 28 U/L (ref 2–50)
ANION GAP SERPL CALC-SCNC: 8 MMOL/L (ref 0–11.9)
APTT PPP: 31.5 SEC (ref 24.7–36)
AST SERPL-CCNC: 20 U/L (ref 12–45)
BASOPHILS # BLD AUTO: 0.4 % (ref 0–1.8)
BASOPHILS # BLD: 0.04 K/UL (ref 0–0.12)
BILIRUB SERPL-MCNC: 0.3 MG/DL (ref 0.1–1.5)
BLD GP AB SCN SERPL QL: NORMAL
BNP SERPL-MCNC: <2 PG/ML (ref 0–100)
BUN SERPL-MCNC: 18 MG/DL (ref 8–22)
CALCIUM SERPL-MCNC: 8.9 MG/DL (ref 8.5–10.5)
CHLORIDE SERPL-SCNC: 102 MMOL/L (ref 96–112)
CO2 SERPL-SCNC: 23 MMOL/L (ref 20–33)
CREAT SERPL-MCNC: 0.8 MG/DL (ref 0.5–1.4)
EOSINOPHIL # BLD AUTO: 0.26 K/UL (ref 0–0.51)
EOSINOPHIL NFR BLD: 2.7 % (ref 0–6.9)
ERYTHROCYTE [DISTWIDTH] IN BLOOD BY AUTOMATED COUNT: 42.5 FL (ref 35.9–50)
GLOBULIN SER CALC-MCNC: 2.7 G/DL (ref 1.9–3.5)
GLUCOSE SERPL-MCNC: 103 MG/DL (ref 65–99)
HCT VFR BLD AUTO: 48.7 % (ref 42–52)
HGB BLD-MCNC: 16.2 G/DL (ref 14–18)
IMM GRANULOCYTES # BLD AUTO: 0.09 K/UL (ref 0–0.11)
IMM GRANULOCYTES NFR BLD AUTO: 0.9 % (ref 0–0.9)
INR PPP: 0.99 (ref 0.87–1.13)
LIPASE SERPL-CCNC: 6 U/L (ref 11–82)
LYMPHOCYTES # BLD AUTO: 2.25 K/UL (ref 1–4.8)
LYMPHOCYTES NFR BLD: 23.3 % (ref 22–41)
MCH RBC QN AUTO: 29.9 PG (ref 27–33)
MCHC RBC AUTO-ENTMCNC: 33.3 G/DL (ref 33.7–35.3)
MCV RBC AUTO: 89.9 FL (ref 81.4–97.8)
MONOCYTES # BLD AUTO: 0.61 K/UL (ref 0–0.85)
MONOCYTES NFR BLD AUTO: 6.3 % (ref 0–13.4)
NEUTROPHILS # BLD AUTO: 6.42 K/UL (ref 1.82–7.42)
NEUTROPHILS NFR BLD: 66.4 % (ref 44–72)
NRBC # BLD AUTO: 0 K/UL
NRBC BLD-RTO: 0 /100 WBC
PLATELET # BLD AUTO: 267 K/UL (ref 164–446)
PMV BLD AUTO: 9 FL (ref 9–12.9)
POTASSIUM SERPL-SCNC: 4.3 MMOL/L (ref 3.6–5.5)
PROT SERPL-MCNC: 6.6 G/DL (ref 6–8.2)
PROTHROMBIN TIME: 13.2 SEC (ref 12–14.6)
RBC # BLD AUTO: 5.42 M/UL (ref 4.7–6.1)
RH BLD: NORMAL
RH BLD: NORMAL
SODIUM SERPL-SCNC: 133 MMOL/L (ref 135–145)
TROPONIN I SERPL-MCNC: <0.01 NG/ML (ref 0–0.04)
WBC # BLD AUTO: 9.7 K/UL (ref 4.8–10.8)

## 2019-02-01 PROCEDURE — 84484 ASSAY OF TROPONIN QUANT: CPT

## 2019-02-01 PROCEDURE — 80053 COMPREHEN METABOLIC PANEL: CPT

## 2019-02-01 PROCEDURE — 99285 EMERGENCY DEPT VISIT HI MDM: CPT

## 2019-02-01 PROCEDURE — 74177 CT ABD & PELVIS W/CONTRAST: CPT

## 2019-02-01 PROCEDURE — 85730 THROMBOPLASTIN TIME PARTIAL: CPT

## 2019-02-01 PROCEDURE — 93005 ELECTROCARDIOGRAM TRACING: CPT | Performed by: EMERGENCY MEDICINE

## 2019-02-01 PROCEDURE — 700117 HCHG RX CONTRAST REV CODE 255: Performed by: EMERGENCY MEDICINE

## 2019-02-01 PROCEDURE — 86850 RBC ANTIBODY SCREEN: CPT

## 2019-02-01 PROCEDURE — 86901 BLOOD TYPING SEROLOGIC RH(D): CPT

## 2019-02-01 PROCEDURE — 71275 CT ANGIOGRAPHY CHEST: CPT

## 2019-02-01 PROCEDURE — 83690 ASSAY OF LIPASE: CPT

## 2019-02-01 PROCEDURE — 700105 HCHG RX REV CODE 258: Performed by: EMERGENCY MEDICINE

## 2019-02-01 PROCEDURE — 85610 PROTHROMBIN TIME: CPT

## 2019-02-01 PROCEDURE — 86900 BLOOD TYPING SEROLOGIC ABO: CPT

## 2019-02-01 PROCEDURE — 36415 COLL VENOUS BLD VENIPUNCTURE: CPT

## 2019-02-01 PROCEDURE — 85025 COMPLETE CBC W/AUTO DIFF WBC: CPT

## 2019-02-01 PROCEDURE — 83880 ASSAY OF NATRIURETIC PEPTIDE: CPT

## 2019-02-01 RX ORDER — SODIUM CHLORIDE 9 MG/ML
1000 INJECTION, SOLUTION INTRAVENOUS ONCE
Status: COMPLETED | OUTPATIENT
Start: 2019-02-01 | End: 2019-02-01

## 2019-02-01 RX ORDER — AMOXICILLIN 500 MG/1
1000 CAPSULE ORAL 2 TIMES DAILY
Qty: 40 CAP | Refills: 0 | Status: SHIPPED | OUTPATIENT
Start: 2019-02-01 | End: 2019-02-12 | Stop reason: SDUPTHER

## 2019-02-01 RX ADMIN — IOHEXOL 100 ML: 350 INJECTION, SOLUTION INTRAVENOUS at 19:38

## 2019-02-01 RX ADMIN — SODIUM CHLORIDE 1000 ML: 9 INJECTION, SOLUTION INTRAVENOUS at 19:12

## 2019-02-02 LAB — EKG IMPRESSION: NORMAL

## 2019-02-02 NOTE — DISCHARGE INSTRUCTIONS
You were seen and evaluated in the Emergency Department at [facility] for:     Cough and abdominal pain and bruising and congestion and earache    You had the following tests and studies:    Thankfully her CT scan shows just a bruise to your chest and abdominal wall without evidence of blood clot or collapsed lung or pneumonia.  Your blood tests and EKG are stable    You received the following prescriptions:    Amoxicillin, you have ear infections on both sides.  Take Tylenol and ibuprofen for pain control.  Use the incentive spirometer provided every 20 minutes while awake to prevent pneumonia.  He was your nighttime oxygen at 3 L.  ----------------------------    Please make sure to follow up with:    Your primary care provider on Monday for recheck and routine health care, but return to the ER immediately for any worsening pain or trouble breathing or fevers or passing out or any other new or worsening symptoms.    Good luck, we hope you get better soon!  ----------------------------    We always encourage patients to return IMMEDIATELY if they have:  Increased or changing pain, passing out, fevers over 100.4 (taken in your mouth or rectally) for more than 2 days, redness or swelling of skin or tissues, feeling like your heart is beating fast, chest pain that is new or worsening, trouble breathing, feeling like your throat is closing up and can not breath, inability to walk, weakness of any part of your body, new dizziness, severe bleeding that won't stop from any part of your body, if you can't eat or drink, or if you have any other concerns.   If you feel worse, please know that you can always return with any questions, concerns, worse symptoms, or you are feeling unsafe. We certainly cannot say for sure that we have ruled out every illness or dangerous disease, but we feel that at this specific time, your exam, tests, and vital signs like heart rate and blood pressure are safe for discharge.

## 2019-02-02 NOTE — ED NOTES
Pt provided with incentive spirometer and instructions for proper use. Pt completed return demonstration and achieved over 1,500. SPO2 95% on RA.

## 2019-02-02 NOTE — ED PROVIDER NOTES
ED PROVIDER NOTE     Scribed for Edin Curiel M.D. by Jewels Lassiter. 2/1/2019, 6:12 PM.    CHIEF COMPLAINT  Chief Complaint   Patient presents with   • Cough   • RUQ Pain     Bruise to rt side of abdomen     HPI    Primary care provider: Glenroy Cody M.D.  Means of arrival: Walk-in  History obtained from: Patient  History limited by: None    Ta Chris Stokes is a 53 y.o. male who presents with a cough and right-sided abdominal pain onset ~7 days ago. The patient reports of experiencing cold-like symptoms for the last week, but states the cough has been worsening in the last few days. He began developing right upper quadrant pains today and states the pain worsens with excessive coughing and sneezing. Today he noticed a bruise to the right abdomen. He denies any trauma to the area to have induced symptoms. He also denies any vomiting, syncope, or fever. He takes Lisinopril daily, but is not anticoagulated. He has no allergies to medications. He has no pertinent surgical history. He is a former smoker 2 years ago after smoking for 30 years ago. He has no history of COPD or asthma.  Mild relief with cold medications. Symptoms at worst severe when sneezing, only mild at rest. No fevers.     REVIEW OF SYSTEMS  Constitutional: Negative for fever  ENT: Bilateral ear fullness, stuffiness.  Respiratory: Cough, no shortness of breath.   CV: Right anterior chest pain, no syncope.   Gastrointestinal: Right upper quadrant pains, negative for vomiting.   Skin: Bruise to right abdomen.   Neurological: Negative for numbness/weakness/tingling.   All other systems reviewed and are negative.    PAST MEDICAL HISTORY   has a past medical history of Hypertension.    PAST FAMILY HISTORY  History reviewed. No pertinent family history.    SOCIAL HISTORY  Social History     Social History Main Topics   • Smoking status: Current Every Day Smoker   • Smokeless tobacco: Never Used      Comment: 1ppd   • Alcohol use No   • Drug use: No        SURGICAL HISTORY  patient denies any surgical history    CURRENT MEDICATIONS  No current facility-administered medications for this encounter.     Current Outpatient Prescriptions:   •  amoxicillin (AMOXIL) 500 MG Cap, Take 2 Caps by mouth 2 times a day for 10 days., Disp: 40 Cap, Rfl: 0  •  lisinopril (PRINIVIL) 20 MG Tab, TAKE ONE TABLET BY MOUTH ONCE DAILY, Disp: 90 Tab, Rfl: 1  •  vitamin D, Ergocalciferol, (DRISDOL) 53770 units Cap capsule, Take 1 Cap by mouth Q30 DAYS., Disp: 3 Cap, Rfl: 3  •  Misc. Devices Misc, Overnight oximetry, O2 concentrator to keep nocturnal O2 sats > 92%, Disp: 1 Device, Rfl: 0  •  Misc. Devices Misc, Overnight oximetry to recheck nocturnal hypoxia, concentrator to keep O2 above 90%, Disp: 1 Device, Rfl: 0  •  Aspirin (ASPIR-81 PO), Take  by mouth., Disp: , Rfl:     ALLERGIES  No Known Allergies    PHYSICAL EXAM  VITAL SIGNS: /68   Pulse 94   Temp 36.6 °C (97.9 °F) (Temporal)   Resp 18   Wt 103.4 kg (227 lb 15.3 oz)   SpO2 92%   BMI 35.70 kg/m²    Pulse ox interpretation: On room air, I interpret this pulse ox as normal.  Constitutional: Sitting up in mild distress   HEENT: Normocephalic, atraumatic. Posterior pharynx clear, mucous membranes dry  Eyes:  EOMI. Normal sclerae.  Neck: Supple, nontender.  Chest/Pulmonary: Slight diminished breath sounds at both bases, no wheezes or rhonchi. Right anterior chest wall tenderness. No crepitus.   Cardiovascular: Regular rate and rhythm, no murmur.   Abdomen: Soft, nontender; no rebound, guarding, or masses.  Back: No CVA or midline tenderness.   Musculoskeletal: No deformity or edema.  Neuro: Clear speech, normal coordination, cranial nerves II-XII grossly intact, no focal asymmetry or sensory deficits.   Psych: Normal mood and affect.  Skin: Large ecchymosis to right abdomen.     DIAGNOSTIC STUDIES / PROCEDURES    LABS & EKG  Results for orders placed or performed during the hospital encounter of 02/01/19   CBC WITH  DIFFERENTIAL   Result Value Ref Range    WBC 9.7 4.8 - 10.8 K/uL    RBC 5.42 4.70 - 6.10 M/uL    Hemoglobin 16.2 14.0 - 18.0 g/dL    Hematocrit 48.7 42.0 - 52.0 %    MCV 89.9 81.4 - 97.8 fL    MCH 29.9 27.0 - 33.0 pg    MCHC 33.3 (L) 33.7 - 35.3 g/dL    RDW 42.5 35.9 - 50.0 fL    Platelet Count 267 164 - 446 K/uL    MPV 9.0 9.0 - 12.9 fL    Neutrophils-Polys 66.40 44.00 - 72.00 %    Lymphocytes 23.30 22.00 - 41.00 %    Monocytes 6.30 0.00 - 13.40 %    Eosinophils 2.70 0.00 - 6.90 %    Basophils 0.40 0.00 - 1.80 %    Immature Granulocytes 0.90 0.00 - 0.90 %    Nucleated RBC 0.00 /100 WBC    Neutrophils (Absolute) 6.42 1.82 - 7.42 K/uL    Lymphs (Absolute) 2.25 1.00 - 4.80 K/uL    Monos (Absolute) 0.61 0.00 - 0.85 K/uL    Eos (Absolute) 0.26 0.00 - 0.51 K/uL    Baso (Absolute) 0.04 0.00 - 0.12 K/uL    Immature Granulocytes (abs) 0.09 0.00 - 0.11 K/uL    NRBC (Absolute) 0.00 K/uL   COMP METABOLIC PANEL   Result Value Ref Range    Sodium 133 (L) 135 - 145 mmol/L    Potassium 4.3 3.6 - 5.5 mmol/L    Chloride 102 96 - 112 mmol/L    Co2 23 20 - 33 mmol/L    Anion Gap 8.0 0.0 - 11.9    Glucose 103 (H) 65 - 99 mg/dL    Bun 18 8 - 22 mg/dL    Creatinine 0.80 0.50 - 1.40 mg/dL    Calcium 8.9 8.5 - 10.5 mg/dL    AST(SGOT) 20 12 - 45 U/L    ALT(SGPT) 28 2 - 50 U/L    Alkaline Phosphatase 56 30 - 99 U/L    Total Bilirubin 0.3 0.1 - 1.5 mg/dL    Albumin 3.9 3.2 - 4.9 g/dL    Total Protein 6.6 6.0 - 8.2 g/dL    Globulin 2.7 1.9 - 3.5 g/dL    A-G Ratio 1.4 g/dL   LIPASE   Result Value Ref Range    Lipase 6 (L) 11 - 82 U/L   TROPONIN   Result Value Ref Range    Troponin I <0.01 0.00 - 0.04 ng/mL   BTYPE NATRIURETIC PEPTIDE   Result Value Ref Range    B Natriuretic Peptide <2 0 - 100 pg/mL   PROTHROMBIN TIME (INR)   Result Value Ref Range    PT 13.2 12.0 - 14.6 sec    INR 0.99 0.87 - 1.13   APTT   Result Value Ref Range    APTT 31.5 24.7 - 36.0 sec   COD (ADULT)   Result Value Ref Range    ABO Grouping Only O     Rh Grouping Only POS      Antibody Screen-Cod NEG    ABO AND RH CONFIRMATION   Result Value Ref Range    ABO Confirm O     Second Rh Group POS    ESTIMATED GFR   Result Value Ref Range    GFR If African American >60 >60 mL/min/1.73 m 2    GFR If Non African American >60 >60 mL/min/1.73 m 2   EKG (NOW)   Result Value Ref Range    Report       [facility] Emergency Dept.    Test Date:  2019  Pt Name:    ALLI KENNEDY                 Department: ER  MRN:        [id number]                      Room:       [facility]  Gender:     Male                         Technician: [id number]  :        1965                   Requested By:EDIN PAYNE  Order #:    [id number]                    Reading MD: Edin Payne MD    Measurements  Intervals                                Axis  Rate:       82                           P:          62  DE:         132                          QRS:        93  QRSD:       100                          T:          27  QT:         376  QTc:        439    Interpretive Statements  SINUS RHYTHM  LEFT POSTERIOR FASCICULAR BLOCK  BASELINE WANDER IN LEAD(S) V3  Compared to ECG 2009 13:34:40  Left posterior fascicular block now present  No STEMI or strain  Electronically Signed On 2019 7:51:57 PST by Edin Payne MD       RADIOLOGY  CT-ABDOMEN-PELVIS WITH   Final Result      1.  Asymmetric thickening of the right lower anterior intercostal muscles with a small amount of fluid seen extending between the deep and superficial layers of the right lower chest and anterior lateral abdominal wall muscles likely representing    hematoma. A definite associated rib fracture is not seen.      2.  Fatty liver.      3.  Small left lobe hepatic cyst or hemangioma.      4.  No evidence of bowel obstruction or focal inflammatory change within the abdomen or pelvis.      CT-CTA CHEST PULMONARY ARTERY W/ RECONS   Final Result      1.  No evidence of pulmonary embolus.      2.  Mild bullous change of the  lungs bilaterally.          COURSE & MEDICAL DECISION MAKING    This is a 53 y.o. male who presents with cough for the last week who is now developed right anterior chest wall and abdominal pain.  Bruise noted today.    Differential Diagnosis includes but is not limited to:  URI, PNA, PE, fracture, hemorrhage, pneumothorax, hemothorax    ED Course:  6:34 PM Patient was evaluated at bedside. Ordered CTA PE, CT-abdomen, pelvis contrast, CMP, lipase, troponin, BNP, prothrombin time, APTT, COD, CBC, and EKG. The patient will receive IV fluids for concerns of dehydration and NPO in case a surgical process is present.     Thankfully the patient's workup is reassuring, at one point he became hypoxic so nursing placed him on oxygen.  He has a stable EKG without evidence of STEMI or dysrhythmia, his troponin is negative his lipase is normal doubt Rosas Chaves sign or other intra-abdominal disease process, his advanced imaging also shows no concerning acute cause of his symptoms and there is a hematoma that seems to stem from between his ribs.  I presume the patient had a coughing fit in burst and intercostal vessel leading to this impressive bruising.  He has a stable hemoglobin and no evidence of fever and his white count is normal.  While sitting up, on room air, he is not hypoxic.  He does have a sleep doctor and wears 3 L of oxygen at night which have encouraged him to continue.  He is pulling with an incentive spirometer well and have asked to be use this for the next week to prevent developing any splinting or pneumonia.  The patient has stable vital signs and is well-appearing and comfortable and wishes to go home and follow-up with his primary care provider which I feel is appropriate, however I have asked that he return immediately for any new or worsening pain or fevers or trouble breathing or bruising or lightheadedness or any other new or worsening symptoms.  He has had upper respiratory symptoms for the last  several days prior to discharge he complained of bilateral ear fullness and he does actually have bilateral otitis media which I will treat with amoxicillin.    On final review of patient's imaging he did have a hepatic cyst, I contacted his primary care provider through our messaging system to arrange outpatient follow-up of this.    Medications   NS infusion 1,000 mL (0 mL Intravenous Stopped 2/1/19 2101)   iohexol (OMNIPAQUE) 350 mg/mL (100 mL Intravenous Given 2/1/19 1938)     FINAL IMPRESSION  1. Acute chest pain    2. Cough    3. Acute suppurative otitis media of both ears without spontaneous rupture of tympanic membranes, recurrence not specified    4. Abdominal wall hematoma, initial encounter    5. Essential hypertension    6. Hepatic cyst        PRESCRIPTIONS  Discharge Medication List as of 2/1/2019  9:02 PM      START taking these medications    Details   amoxicillin (AMOXIL) 500 MG Cap Take 2 Caps by mouth 2 times a day for 10 days., Disp-40 Cap, R-0, Print Rx Paper             FOLLOW UP  Glenroy Cody M.D.  [address] [id number] [phone]    Schedule an appointment as soon as possible for a visit in 3 days      [facility], Emergency Dept  [address] [facility] [address] [phone]  Today  If you have ANY new or worse symptoms!        -DISCHARGE-       The patient is referred to a primary physician for blood pressure management, diabetic screening, and for all other preventative health concerns.     Pertinent Labs & Imaging studies reviewed and verified by myself, as well as nursing notes and the patient's past medical, family, and social histories (See chart for details).    Results, exam findings, clinical impression, presumed diagnosis, treatment options, and strict return precautions were discussed with the patient, and they verbalized understanding, agreed with, and appreciated the plan of care.    I, Jewels Lassiter (Gladys), am scribing for, and in the  presence of, Edin Curiel M.D..    Electronically signed by: Jewels Lassiter (Scribe), 2/1/2019    I, Edin Curiel M.D. personally performed the services described in this documentation, as scribed by Jewels Lassiter in my presence, and it is both accurate and complete. C.     Portions of this record were made with voice recognition software.  Despite my review, spelling/grammar/context errors may still remain.  Interpretation of this chart should be taken in this context.    The note accurately reflects work and decisions made by me.  Edin Curiel  2/2/2019  7:54 AM

## 2019-02-02 NOTE — ED TRIAGE NOTES
Pt to triage , c/o cough x 1 week , better now. Pt now c/o RUQ pain with coughing or sneezing , and incidentally noticed a large bruise on the rt side of his abdomen today . Denies any trauma

## 2019-02-12 ENCOUNTER — OFFICE VISIT (OUTPATIENT)
Dept: MEDICAL GROUP | Facility: MEDICAL CENTER | Age: 54
End: 2019-02-12
Attending: FAMILY MEDICINE
Payer: MEDICAID

## 2019-02-12 VITALS
TEMPERATURE: 98.1 F | BODY MASS INDEX: 35.31 KG/M2 | RESPIRATION RATE: 14 BRPM | HEIGHT: 67 IN | SYSTOLIC BLOOD PRESSURE: 120 MMHG | HEART RATE: 93 BPM | DIASTOLIC BLOOD PRESSURE: 80 MMHG | WEIGHT: 225 LBS | OXYGEN SATURATION: 91 %

## 2019-02-12 DIAGNOSIS — J06.9 UPPER RESPIRATORY TRACT INFECTION, UNSPECIFIED TYPE: ICD-10-CM

## 2019-02-12 DIAGNOSIS — R05.9 COUGH: ICD-10-CM

## 2019-02-12 DIAGNOSIS — S29.019S: ICD-10-CM

## 2019-02-12 PROBLEM — S29.019A: Status: ACTIVE | Noted: 2019-02-12

## 2019-02-12 PROCEDURE — 99214 OFFICE O/P EST MOD 30 MIN: CPT | Performed by: FAMILY MEDICINE

## 2019-02-12 PROCEDURE — 99213 OFFICE O/P EST LOW 20 MIN: CPT | Performed by: FAMILY MEDICINE

## 2019-02-12 RX ORDER — AMOXICILLIN 500 MG/1
1000 CAPSULE ORAL 2 TIMES DAILY
Qty: 40 CAP | Refills: 0 | Status: SHIPPED | OUTPATIENT
Start: 2019-02-12 | End: 2019-02-22

## 2019-02-12 ASSESSMENT — ENCOUNTER SYMPTOMS
NECK PAIN: 0
COUGH: 0
RHINORRHEA: 1
SINUS PAIN: 0
ABDOMINAL PAIN: 0
PALPITATIONS: 0
SPUTUM PRODUCTION: 0
DIARRHEA: 0
FEVER: 0
WHEEZING: 0
VOMITING: 0
HEADACHES: 0
CHILLS: 0
SWOLLEN GLANDS: 0
NAUSEA: 0
SHORTNESS OF BREATH: 0
SORE THROAT: 0

## 2019-02-12 ASSESSMENT — PATIENT HEALTH QUESTIONNAIRE - PHQ9: CLINICAL INTERPRETATION OF PHQ2 SCORE: 0

## 2019-02-13 NOTE — PROGRESS NOTES
Subjective:      Ta Stokes is a 53 y.o. male who presents with Follow-Up (cough)            Patient 53-year-old male is here for follow-up of her recent ER visit for a cough and bruising under his rib.  CT scans were ordered and the results are as follows:    1.  Asymmetric thickening of the right lower anterior intercostal muscles with a small amount of fluid seen extending between the deep and superficial layers of the right lower chest and anterior lateral abdominal wall muscles likely representing   hematoma. A definite associated rib fracture is not seen.    2.  Fatty liver.    3.  Small left lobe hepatic cyst or hemangioma.    4.  No evidence of bowel obstruction or focal inflammatory change within the abdomen or pelvis.    1.  No evidence of pulmonary embolus.    2.  Mild bullous change of the lungs bilaterally.    Today at follow-up he is doing much better with the bruising almost completely resolved.  He is still having some tenderness over the area of the intercostal muscle injury.  He has been advised to try to minimize his cough with over-the-counter cough suppressants.  If he notices any worsening of his pain or recurrent bruising we may we order the CT scan of his chest if he has any shortness of breath chest pain or other more systemic symptoms he has been advised to go back to the emergency room for further assistance and management.    URI    This is a recurrent problem. The current episode started 1 to 4 weeks ago. The problem has been rapidly improving. There has been no fever. Associated symptoms include congestion, ear pain, a plugged ear sensation and rhinorrhea. Pertinent negatives include no abdominal pain, chest pain, coughing, diarrhea, dysuria, headaches, joint pain, joint swelling, nausea, neck pain, rash, sinus pain, sneezing, sore throat, swollen glands, vomiting or wheezing. Associated symptoms comments: He is having a persistent fullness and pain on his right side.  .  "Treatments tried: We will have him continue to use his amoxicillin as previously directed and also will have patient use nasal saline as well as nasal steroid spray.  Discussed an ENT referral if he continues to have problems.       Review of Systems   Constitutional: Negative for chills and fever.   HENT: Positive for congestion, ear pain and rhinorrhea. Negative for hearing loss, sinus pain, sneezing, sore throat and tinnitus.    Respiratory: Negative for cough, sputum production, shortness of breath and wheezing.    Cardiovascular: Negative for chest pain and palpitations.   Gastrointestinal: Negative for abdominal pain, diarrhea, nausea and vomiting.   Genitourinary: Negative for dysuria.   Musculoskeletal: Negative for joint pain and neck pain.   Skin: Negative for rash.   Neurological: Negative for headaches.          Objective:     /80 (BP Location: Left arm, Patient Position: Sitting)   Pulse 93   Temp 36.7 °C (98.1 °F)   Resp 14   Ht 1.702 m (5' 7\")   Wt 102.1 kg (225 lb)   SpO2 91%   BMI 35.24 kg/m²      Physical Exam   Constitutional: He is oriented to person, place, and time. He appears well-developed and well-nourished.   HENT:   Head: Normocephalic and atraumatic.   Right TM dull with erythema around the edges.  Nasal congestion with slight right-sided maxillary tenderness.   Cardiovascular: Normal rate, regular rhythm and normal heart sounds.  Exam reveals no friction rub.    No murmur heard.  Pulmonary/Chest: Effort normal and breath sounds normal. No respiratory distress. He has no wheezes. He has no rales.   Bruising on the ribs settle to lower flank almost resolved   Abdominal: Soft. Bowel sounds are normal. He exhibits no distension. There is no tenderness.   Neurological: He is alert and oriented to person, place, and time.   Skin: Skin is warm and dry.   Psychiatric: He has a normal mood and affect. His behavior is normal.   Nursing note and vitals reviewed.            "   Assessment/Plan:     1. Tear of intercostal muscle, sequela  We will have patient continue to symptomatically treat his intercostal muscle injury.  The bruising has almost completely resolved.  Reviewed the results of his recent CT scans.  We will continue to follow.  ER precautions have been given to patient.    2. Upper respiratory tract infection, unspecified type  Since he is still having problems with his right ear, another round of antibiotics has been given to patient.  If in 2 weeks patient is still having issues with his ear will refer to ENT for any further assessment and possible management.  We will continue to follow.    3. Cough  We will have patient use over-the-counter cough suppressants.  We will continue to follow.

## 2019-02-27 ENCOUNTER — OFFICE VISIT (OUTPATIENT)
Dept: MEDICAL GROUP | Facility: MEDICAL CENTER | Age: 54
End: 2019-02-27
Attending: FAMILY MEDICINE
Payer: MEDICAID

## 2019-02-27 VITALS
BODY MASS INDEX: 35.79 KG/M2 | TEMPERATURE: 98.9 F | SYSTOLIC BLOOD PRESSURE: 115 MMHG | OXYGEN SATURATION: 92 % | DIASTOLIC BLOOD PRESSURE: 66 MMHG | HEIGHT: 67 IN | WEIGHT: 228 LBS | RESPIRATION RATE: 20 BRPM | HEART RATE: 98 BPM

## 2019-02-27 DIAGNOSIS — S29.019S: ICD-10-CM

## 2019-02-27 DIAGNOSIS — R05.9 COUGH: ICD-10-CM

## 2019-02-27 PROCEDURE — 99213 OFFICE O/P EST LOW 20 MIN: CPT | Performed by: FAMILY MEDICINE

## 2019-02-27 PROCEDURE — 99212 OFFICE O/P EST SF 10 MIN: CPT | Performed by: FAMILY MEDICINE

## 2019-02-27 ASSESSMENT — ENCOUNTER SYMPTOMS
SPUTUM PRODUCTION: 0
VOMITING: 0
COUGH: 0
CHILLS: 0
SHORTNESS OF BREATH: 0
HEMOPTYSIS: 0
HEARTBURN: 0
ABDOMINAL PAIN: 0
WEIGHT LOSS: 0
FEVER: 0
WHEEZING: 0
PALPITATIONS: 0
MYALGIAS: 0
RHINORRHEA: 0
SORE THROAT: 0
SWEATS: 0
HEADACHES: 0
NAUSEA: 0

## 2019-02-27 ASSESSMENT — PAIN SCALES - GENERAL: PAINLEVEL: NO PAIN

## 2019-02-27 NOTE — PROGRESS NOTES
"Subjective:      Ta Stokes is a 53 y.o. male who presents with Cough (cough is gone)            Cough   This is a recurrent problem. The current episode started 1 to 4 weeks ago. The problem has been resolved. The cough is non-productive. Pertinent negatives include no chest pain, chills, ear congestion, ear pain, fever, headaches, heartburn, hemoptysis, myalgias, nasal congestion, postnasal drip, rash, rhinorrhea, sore throat, shortness of breath, sweats, weight loss or wheezing. Risk factors for lung disease include smoking/tobacco exposure. He has tried OTC cough suppressant (amoxicillin, will continue to follow) for the symptoms.       Review of Systems   Constitutional: Negative for chills, fever and weight loss.   HENT: Positive for congestion. Negative for ear pain, postnasal drip, rhinorrhea and sore throat.    Respiratory: Negative for cough, hemoptysis, sputum production, shortness of breath and wheezing.    Cardiovascular: Negative for chest pain and palpitations.   Gastrointestinal: Negative for abdominal pain, heartburn, nausea and vomiting.   Musculoskeletal: Negative for myalgias.   Skin: Negative for rash.   Neurological: Negative for headaches.          Objective:     /66 (BP Location: Left arm, Patient Position: Sitting, BP Cuff Size: Adult)   Pulse 98   Temp 37.2 °C (98.9 °F) (Temporal)   Resp 20   Ht 1.702 m (5' 7.01\")   Wt 103.4 kg (228 lb)   SpO2 92%   BMI 35.70 kg/m²      Physical Exam   Constitutional: He is oriented to person, place, and time.   BMI 35.7   HENT:   Head: Normocephalic and atraumatic.   Cardiovascular: Normal rate, regular rhythm and normal heart sounds.  Exam reveals no friction rub.    No murmur heard.  Pulmonary/Chest: Effort normal and breath sounds normal. No respiratory distress. He has no wheezes. He has no rales.   Neurological: He is alert and oriented to person, place, and time.   Skin: Skin is warm and dry.   Psychiatric: He has a normal mood " and affect. His behavior is normal.   Nursing note and vitals reviewed.              Assessment/Plan:     1. Tear of intercostal muscle, sequela  Resolved, will continue to follow.    2. Cough  Resolved, will continue to follow.

## 2019-04-15 ENCOUNTER — PATIENT MESSAGE (OUTPATIENT)
Dept: MEDICAL GROUP | Facility: MEDICAL CENTER | Age: 54
End: 2019-04-15

## 2019-04-15 DIAGNOSIS — I10 ESSENTIAL HYPERTENSION: ICD-10-CM

## 2019-04-15 RX ORDER — LISINOPRIL 20 MG/1
TABLET ORAL
Qty: 90 TAB | Refills: 1 | Status: SHIPPED | OUTPATIENT
Start: 2019-04-15 | End: 2019-09-05 | Stop reason: SDUPTHER

## 2019-08-21 NOTE — PATIENT INSTRUCTIONS
Dr. Shields please advise on cardiac clearance for colonoscopy, she is scheduled 8/30/19 with Dr. Miranda, she is on aspirin 81 mg daily   Pt advised to go to the er for worsened sxs.

## 2019-09-05 ENCOUNTER — OFFICE VISIT (OUTPATIENT)
Dept: MEDICAL GROUP | Facility: MEDICAL CENTER | Age: 54
End: 2019-09-05
Attending: FAMILY MEDICINE
Payer: MEDICAID

## 2019-09-05 VITALS
DIASTOLIC BLOOD PRESSURE: 58 MMHG | OXYGEN SATURATION: 95 % | HEART RATE: 80 BPM | BODY MASS INDEX: 36.73 KG/M2 | HEIGHT: 67 IN | TEMPERATURE: 98 F | SYSTOLIC BLOOD PRESSURE: 108 MMHG | RESPIRATION RATE: 16 BRPM | WEIGHT: 234 LBS

## 2019-09-05 DIAGNOSIS — R73.9 HYPERGLYCEMIA: ICD-10-CM

## 2019-09-05 DIAGNOSIS — I10 ESSENTIAL HYPERTENSION: ICD-10-CM

## 2019-09-05 PROCEDURE — 99213 OFFICE O/P EST LOW 20 MIN: CPT | Performed by: FAMILY MEDICINE

## 2019-09-05 RX ORDER — LISINOPRIL 20 MG/1
TABLET ORAL
Qty: 90 TAB | Refills: 1 | Status: SHIPPED | OUTPATIENT
Start: 2019-09-05 | End: 2021-07-20 | Stop reason: SDUPTHER

## 2019-09-05 RX ORDER — ERGOCALCIFEROL 1.25 MG/1
50000 CAPSULE ORAL
Qty: 3 CAP | Refills: 3 | Status: SHIPPED | OUTPATIENT
Start: 2019-09-05 | End: 2020-08-13 | Stop reason: SDUPTHER

## 2019-09-05 RX ORDER — ERGOCALCIFEROL 1.25 MG/1
50000 CAPSULE ORAL
Qty: 3 CAP | Refills: 3 | Status: SHIPPED | OUTPATIENT
Start: 2019-09-05 | End: 2023-03-22

## 2019-09-05 RX ORDER — LISINOPRIL 20 MG/1
TABLET ORAL
Qty: 90 TAB | Refills: 3 | Status: SHIPPED | OUTPATIENT
Start: 2019-09-05 | End: 2020-04-23 | Stop reason: SDUPTHER

## 2019-09-05 ASSESSMENT — ENCOUNTER SYMPTOMS
PSYCHIATRIC NEGATIVE: 1
VOMITING: 0
CHILLS: 0
SPUTUM PRODUCTION: 0
COUGH: 0
NAUSEA: 0
SHORTNESS OF BREATH: 0
MUSCULOSKELETAL NEGATIVE: 1
NEUROLOGICAL NEGATIVE: 1
ABDOMINAL PAIN: 0
FEVER: 0
PALPITATIONS: 0

## 2019-09-05 NOTE — PROGRESS NOTES
"Subjective:      Ta Stokes is a 54 y.o. male who presents with Follow-Up            Patient 54-year-old male here for follow-up of his hypertension and vitamin D deficiency.      He will need his blood pressure medication refilled today.  He has been taking his medication as directed and is not having any chest pain, shortness of breath or other cardiac or neurologic symptom.  Will have him check his blood pressures at home and keep notes.    We will also send his vitamin D prescription over to the pharmacy.  He will continue taking his medication as directed we will continue to monitor his vitamin D levels.  We will continue to follow.     Current medications, allergies, and problem list reviewed with patient and updated in EPIC.        Review of Systems   Constitutional: Negative for chills and fever.   HENT: Negative for hearing loss and tinnitus.    Respiratory: Negative for cough, sputum production and shortness of breath.    Cardiovascular: Negative for chest pain and palpitations.   Gastrointestinal: Negative for abdominal pain, nausea and vomiting.   Musculoskeletal: Negative.    Skin: Negative for rash.   Neurological: Negative.    Psychiatric/Behavioral: Negative.           Objective:     /58 (BP Location: Left arm, Patient Position: Sitting, BP Cuff Size: Adult)   Pulse 80   Temp 36.7 °C (98 °F) (Temporal)   Resp 16   Ht 1.702 m (5' 7.01\")   Wt 106.1 kg (234 lb)   SpO2 95%   BMI 36.64 kg/m²      Physical Exam   Constitutional: He is oriented to person, place, and time.   BMI 36.64   HENT:   Head: Normocephalic and atraumatic.   Cardiovascular: Normal rate, regular rhythm and normal heart sounds. Exam reveals no friction rub.   No murmur heard.  Pulmonary/Chest: Effort normal and breath sounds normal. No stridor. No respiratory distress. He has no wheezes.   Abdominal: Soft. Bowel sounds are normal. He exhibits no distension. There is no tenderness.   Musculoskeletal: Normal range of " motion.   Neurological: He is alert and oriented to person, place, and time.   Skin: Skin is warm and dry.   Psychiatric: He has a normal mood and affect. His behavior is normal.   Nursing note and vitals reviewed.              Assessment/Plan:     1. Vitamin D deficiency  Will have patient continue taking his vitamin D as directed.  We will continue to monitor his levels.  We will continue to follow.  - vitamin D, Ergocalciferol, (DRISDOL) 01477 units Cap capsule; Take 1 Cap by mouth Q30 DAYS.  Dispense: 3 Cap; Refill: 3      2. Essential hypertension  He will continue to take his medication as directed his refill will be sent to his pharmacy.  He will continue to check his blood pressures at home and keep notes.  - lisinopril (PRINIVIL) 20 MG Tab; TAKE ONE TABLET BY MOUTH ONCE DAILY  Dispense: 90 Tab; Refill: 3

## 2019-11-22 ENCOUNTER — OFFICE VISIT (OUTPATIENT)
Dept: MEDICAL GROUP | Facility: MEDICAL CENTER | Age: 54
End: 2019-11-22
Attending: FAMILY MEDICINE
Payer: MEDICAID

## 2019-11-22 VITALS
TEMPERATURE: 97.8 F | OXYGEN SATURATION: 95 % | SYSTOLIC BLOOD PRESSURE: 94 MMHG | BODY MASS INDEX: 36.88 KG/M2 | RESPIRATION RATE: 16 BRPM | DIASTOLIC BLOOD PRESSURE: 50 MMHG | HEART RATE: 88 BPM | WEIGHT: 235 LBS | HEIGHT: 67 IN

## 2019-11-22 DIAGNOSIS — I10 ESSENTIAL HYPERTENSION: ICD-10-CM

## 2019-11-22 DIAGNOSIS — G47.34 NOCTURNAL HYPOXIA: ICD-10-CM

## 2019-11-22 DIAGNOSIS — Z12.11 SCREENING FOR MALIGNANT NEOPLASM OF COLON: ICD-10-CM

## 2019-11-22 PROCEDURE — 99213 OFFICE O/P EST LOW 20 MIN: CPT | Performed by: FAMILY MEDICINE

## 2019-11-22 NOTE — PROGRESS NOTES
"Subjective:      Ta Stokes is a 54 y.o. male who presents with No chief complaint on file.            HPI 1.  Essential hypertension-patient reports he is compliant taking his 20 mg of lisinopril daily.  Not reporting any lightheadedness, chest pain, palpitations.  2.  Nocturnal hypoxemia-patient used to be followed by Dr. Plummer before he retired.  Last note from 2017 documented nocturnal hypoxemia and patient reports that he had very minimal obstructive sleep apnea but apparently not enough to treat with CPAP at that time.  Patient reports he currently has a home oxygen constant which he wears overnight at 3 L which definitely helps him sleep better.    ROS       Objective:     BP (!) 94/50 (BP Location: Left arm, Patient Position: Sitting, BP Cuff Size: Large adult)   Pulse 88   Temp 36.6 °C (97.8 °F) (Temporal)   Resp 16   Ht 1.702 m (5' 7.01\")   Wt 106.6 kg (235 lb)   SpO2 95%   BMI 36.80 kg/m²      Physical Exam  Gen.- alert, cooperative, in no acute distress  Neck- midline trachea, thyroid not enlarged or tender,supple, no cervical adenopathy  Chest-clear to auscultation and percussion with normal breath sounds. No retractions. Chest wall nontender  Cardiac- regular rhythm and rate. No murmur, thrill, or heave  Lower extremities-mild compression line at the top of his socks with slight skin dryness and minimal pinkness at that level.     Patient completed his flu shot at the drugstore     Assessment/Plan:       1. Essential hypertension      2. Nocturnal hypoxia      3. Screening for malignant neoplasm of colon    - OCCULT BLOOD FECES IMMUNOASSAY; Future  Plan: 1.  Patient will continue wear oxygen  2.  Patient will question his sleep partner whether he has worsening symptoms of sleep apnea or not recently  3.  Patient agrees to check for home blood pressures and forward those to us in the next 30 days  4.  Follow-up with me in 2 months    "

## 2019-11-28 ENCOUNTER — HOSPITAL ENCOUNTER (OUTPATIENT)
Facility: MEDICAL CENTER | Age: 54
End: 2019-11-28
Attending: FAMILY MEDICINE
Payer: MEDICAID

## 2019-11-28 PROCEDURE — 82274 ASSAY TEST FOR BLOOD FECAL: CPT

## 2019-12-03 DIAGNOSIS — Z12.11 SCREENING FOR MALIGNANT NEOPLASM OF COLON: ICD-10-CM

## 2019-12-05 LAB — HEMOCCULT STL QL IA: POSITIVE

## 2019-12-06 ENCOUNTER — TELEPHONE (OUTPATIENT)
Dept: MEDICAL GROUP | Facility: MEDICAL CENTER | Age: 54
End: 2019-12-06

## 2019-12-06 DIAGNOSIS — R19.5 POSITIVE FIT (FECAL IMMUNOCHEMICAL TEST): ICD-10-CM

## 2019-12-10 ENCOUNTER — TELEPHONE (OUTPATIENT)
Dept: MEDICAL GROUP | Facility: MEDICAL CENTER | Age: 54
End: 2019-12-10

## 2019-12-11 NOTE — TELEPHONE ENCOUNTER
Phone Number Called: [phone] (home)     Call outcome: left message for patient to call back regarding message below    Message: The stool check for hidden blood returned back positive which is abnormal.  This could be from something as minor as a small hemorrhoid to something as serious as colon cancer.  It will take a actual look around your colon called colonoscopy to sort out the matter.  I will place the referral to gastroenterology to get the colonoscopy appointment.

## 2020-01-21 ENCOUNTER — OFFICE VISIT (OUTPATIENT)
Dept: MEDICAL GROUP | Facility: MEDICAL CENTER | Age: 55
End: 2020-01-21
Attending: FAMILY MEDICINE
Payer: MEDICAID

## 2020-01-21 VITALS
DIASTOLIC BLOOD PRESSURE: 66 MMHG | HEIGHT: 67 IN | TEMPERATURE: 97.8 F | SYSTOLIC BLOOD PRESSURE: 122 MMHG | BODY MASS INDEX: 37.2 KG/M2 | OXYGEN SATURATION: 93 % | HEART RATE: 90 BPM | RESPIRATION RATE: 16 BRPM | WEIGHT: 237 LBS

## 2020-01-21 DIAGNOSIS — G47.33 OBSTRUCTIVE SLEEP APNEA: ICD-10-CM

## 2020-01-21 DIAGNOSIS — I10 ESSENTIAL HYPERTENSION: ICD-10-CM

## 2020-01-21 PROCEDURE — 99213 OFFICE O/P EST LOW 20 MIN: CPT | Performed by: FAMILY MEDICINE

## 2020-01-21 NOTE — PROGRESS NOTES
"Subjective:      Ta Stokes is a 54 y.o. male who presents with Hypertension            HPI 1.  Essential hypertension-patient has been compliant taking 20 mg of lisinopril daily.  Patient notes infrequent cough with scant sputum production.  Not reporting any chest pain.  2.  Obstructive sleep apnea-discussion with the patient sleep partner reveals that he will have repeated episodes of snoring followed by apneas followed by almost choking episodes as he starts breathing again.  Home blood pressure readings have been running 118-130/75-85.    ROS negative for palpitations, headache, altered vision       Objective:     /66 (BP Location: Left arm, Patient Position: Sitting, BP Cuff Size: Large adult)   Pulse 90   Temp 36.6 °C (97.8 °F) (Temporal)   Resp 16   Ht 1.702 m (5' 7.01\")   Wt 107.5 kg (237 lb)   SpO2 93%   BMI 37.11 kg/m²      Physical Exam  Gen.- alert, cooperative, in no acute distress  Neck- midline trachea, thyroid not enlarged or tender,supple, no cervical adenopathy  Chest-clear to auscultation and percussion with normal breath sounds. No retractions. Chest wall nontender  Cardiac- regular rhythm and rate. No murmur, thrill, or heave            Assessment/Plan:       1. Essential hypertension      2. Obstructive sleep apnea    Plan: 1.  Pulmonology referral  2.  Upcoming colonoscopy in March  3.  Revisit with me in 3 months  "

## 2020-04-23 ENCOUNTER — PATIENT MESSAGE (OUTPATIENT)
Dept: MEDICAL GROUP | Facility: MEDICAL CENTER | Age: 55
End: 2020-04-23

## 2020-04-23 ENCOUNTER — OFFICE VISIT (OUTPATIENT)
Dept: MEDICAL GROUP | Facility: MEDICAL CENTER | Age: 55
End: 2020-04-23
Attending: FAMILY MEDICINE
Payer: MEDICAID

## 2020-04-23 DIAGNOSIS — I10 ESSENTIAL HYPERTENSION: ICD-10-CM

## 2020-04-23 PROCEDURE — 99212 OFFICE O/P EST SF 10 MIN: CPT | Mod: CR | Performed by: FAMILY MEDICINE

## 2020-04-23 RX ORDER — LISINOPRIL 20 MG/1
TABLET ORAL
Qty: 90 TAB | Refills: 3 | Status: SHIPPED | OUTPATIENT
Start: 2020-04-23 | End: 2021-05-17

## 2020-04-23 NOTE — PROGRESS NOTES
Telephone Appointment Visit   As a means of avoiding spread of COVID-19, this visit is being conducted by telephone. This telephone visit was initiated by the patient and they verbally consented.    Time at start of call: 1:52 PM    Reason for Call:  Symptom Follow-up    Patient Comments / History:   1.  Essential hypertension-patient reports he has been compliant taking his lisinopril.  Has not checked blood pressures in the past 6 weeks.  Denies any chest pain, chest pressure, lipedema.  Does need a refill for his lisinopril 20 mg.  2.  Lab result abnormality-patient reports that he saw something that concerned him on results that were posted in my chart from an ER evaluation that he had in early February 2019.  We reviewed his CBC, CMP, abdominal CT, and CTA of his chest and I did not see any significant abnormality.  He was on his way to an appointment today so we will check and see what caused him concern and share that with me via my chart for our review  Labs / Images Reviewed        Assessment and Plan:     1. Essential hypertension  - lisinopril (PRINIVIL) 20 MG Tab; TAKE ONE TABLET BY MOUTH ONCE DAILY  Dispense: 90 Tab; Refill: 3      Follow-up: 1.  Patient is asked to forward the MyChart results that are causing him concern  Time at end of call: 2:06 PM  Total Time Spent: 11-20 minutes    Richard Arellano M.D.

## 2020-08-13 DIAGNOSIS — R73.9 HYPERGLYCEMIA: ICD-10-CM

## 2020-08-13 RX ORDER — ERGOCALCIFEROL 1.25 MG/1
50000 CAPSULE ORAL
Qty: 3 CAP | Refills: 3 | Status: SHIPPED | OUTPATIENT
Start: 2020-08-13 | End: 2020-09-05 | Stop reason: SDUPTHER

## 2021-03-15 DIAGNOSIS — Z23 NEED FOR VACCINATION: ICD-10-CM

## 2021-03-17 ENCOUNTER — IMMUNIZATION (OUTPATIENT)
Dept: FAMILY PLANNING/WOMEN'S HEALTH CLINIC | Facility: IMMUNIZATION CENTER | Age: 56
End: 2021-03-17
Attending: INTERNAL MEDICINE
Payer: MEDICAID

## 2021-03-17 DIAGNOSIS — Z23 NEED FOR VACCINATION: ICD-10-CM

## 2021-03-17 DIAGNOSIS — Z23 ENCOUNTER FOR VACCINATION: Primary | ICD-10-CM

## 2021-03-17 PROCEDURE — 0001A PFIZER SARS-COV-2 VACCINE: CPT | Performed by: INTERNAL MEDICINE

## 2021-03-17 PROCEDURE — 91300 PFIZER SARS-COV-2 VACCINE: CPT | Performed by: INTERNAL MEDICINE

## 2021-04-09 ENCOUNTER — IMMUNIZATION (OUTPATIENT)
Dept: FAMILY PLANNING/WOMEN'S HEALTH CLINIC | Facility: IMMUNIZATION CENTER | Age: 56
End: 2021-04-09
Attending: INTERNAL MEDICINE
Payer: MEDICAID

## 2021-04-09 DIAGNOSIS — Z23 ENCOUNTER FOR VACCINATION: Primary | ICD-10-CM

## 2021-04-09 PROCEDURE — 0002A PFIZER SARS-COV-2 VACCINE: CPT | Performed by: INTERNAL MEDICINE

## 2021-04-09 PROCEDURE — 91300 PFIZER SARS-COV-2 VACCINE: CPT | Performed by: INTERNAL MEDICINE

## 2021-07-20 DIAGNOSIS — I10 ESSENTIAL HYPERTENSION: ICD-10-CM

## 2021-07-20 RX ORDER — LISINOPRIL 20 MG/1
TABLET ORAL
Qty: 90 TABLET | Refills: 0 | Status: SHIPPED | OUTPATIENT
Start: 2021-07-20 | End: 2021-08-05 | Stop reason: SDUPTHER

## 2021-07-20 NOTE — TELEPHONE ENCOUNTER
Received request via: Patient    Was the patient seen in the last year in this department? No , appointment scheduled for 08/05/2021    Does the patient have an active prescription (recently filled or refills available) for medication(s) requested? No

## 2021-08-05 ENCOUNTER — OFFICE VISIT (OUTPATIENT)
Dept: MEDICAL GROUP | Facility: MEDICAL CENTER | Age: 56
End: 2021-08-05
Attending: FAMILY MEDICINE
Payer: MEDICAID

## 2021-08-05 VITALS
RESPIRATION RATE: 18 BRPM | HEART RATE: 72 BPM | HEIGHT: 67 IN | WEIGHT: 230 LBS | OXYGEN SATURATION: 92 % | SYSTOLIC BLOOD PRESSURE: 112 MMHG | BODY MASS INDEX: 36.1 KG/M2 | DIASTOLIC BLOOD PRESSURE: 78 MMHG | TEMPERATURE: 96.6 F

## 2021-08-05 DIAGNOSIS — E66.9 OBESITY (BMI 30-39.9): ICD-10-CM

## 2021-08-05 DIAGNOSIS — Z12.5 SCREENING FOR MALIGNANT NEOPLASM OF PROSTATE: ICD-10-CM

## 2021-08-05 DIAGNOSIS — I10 ESSENTIAL HYPERTENSION: ICD-10-CM

## 2021-08-05 DIAGNOSIS — E78.00 ELEVATED LDL CHOLESTEROL LEVEL: ICD-10-CM

## 2021-08-05 DIAGNOSIS — R73.9 HYPERGLYCEMIA: ICD-10-CM

## 2021-08-05 DIAGNOSIS — K21.00 GASTROESOPHAGEAL REFLUX DISEASE WITH ESOPHAGITIS, UNSPECIFIED WHETHER HEMORRHAGE: ICD-10-CM

## 2021-08-05 PROCEDURE — 99214 OFFICE O/P EST MOD 30 MIN: CPT | Performed by: FAMILY MEDICINE

## 2021-08-05 PROCEDURE — 99212 OFFICE O/P EST SF 10 MIN: CPT | Performed by: FAMILY MEDICINE

## 2021-08-05 RX ORDER — LISINOPRIL 20 MG/1
TABLET ORAL
Qty: 30 TABLET | Refills: 11 | Status: SHIPPED | OUTPATIENT
Start: 2021-08-05 | End: 2021-10-25 | Stop reason: SDUPTHER

## 2021-08-05 RX ORDER — FAMOTIDINE 40 MG/1
40 TABLET, FILM COATED ORAL DAILY
Qty: 30 TABLET | Refills: 11 | Status: SHIPPED | OUTPATIENT
Start: 2021-08-05 | End: 2022-08-16 | Stop reason: SDUPTHER

## 2021-08-05 RX ORDER — ERGOCALCIFEROL 1.25 MG/1
50000 CAPSULE ORAL
Qty: 4 CAPSULE | Refills: 6 | Status: SHIPPED | OUTPATIENT
Start: 2021-08-05 | End: 2022-08-16 | Stop reason: SDUPTHER

## 2021-08-05 SDOH — ECONOMIC STABILITY: TRANSPORTATION INSECURITY
IN THE PAST 12 MONTHS, HAS LACK OF TRANSPORTATION KEPT YOU FROM MEETINGS, WORK, OR FROM GETTING THINGS NEEDED FOR DAILY LIVING?: NO

## 2021-08-05 SDOH — ECONOMIC STABILITY: INCOME INSECURITY: HOW HARD IS IT FOR YOU TO PAY FOR THE VERY BASICS LIKE FOOD, HOUSING, MEDICAL CARE, AND HEATING?: SOMEWHAT HARD

## 2021-08-05 SDOH — ECONOMIC STABILITY: TRANSPORTATION INSECURITY
IN THE PAST 12 MONTHS, HAS THE LACK OF TRANSPORTATION KEPT YOU FROM MEDICAL APPOINTMENTS OR FROM GETTING MEDICATIONS?: NO

## 2021-08-05 SDOH — ECONOMIC STABILITY: FOOD INSECURITY: WITHIN THE PAST 12 MONTHS, THE FOOD YOU BOUGHT JUST DIDN'T LAST AND YOU DIDN'T HAVE MONEY TO GET MORE.: NEVER TRUE

## 2021-08-05 SDOH — HEALTH STABILITY: PHYSICAL HEALTH: ON AVERAGE, HOW MANY DAYS PER WEEK DO YOU ENGAGE IN MODERATE TO STRENUOUS EXERCISE (LIKE A BRISK WALK)?: 7 DAYS

## 2021-08-05 SDOH — ECONOMIC STABILITY: HOUSING INSECURITY: IN THE LAST 12 MONTHS, HOW MANY PLACES HAVE YOU LIVED?: 1

## 2021-08-05 SDOH — ECONOMIC STABILITY: HOUSING INSECURITY

## 2021-08-05 SDOH — HEALTH STABILITY: PHYSICAL HEALTH: ON AVERAGE, HOW MANY MINUTES DO YOU ENGAGE IN EXERCISE AT THIS LEVEL?: 150+ MIN

## 2021-08-05 SDOH — ECONOMIC STABILITY: FOOD INSECURITY: WITHIN THE PAST 12 MONTHS, YOU WORRIED THAT YOUR FOOD WOULD RUN OUT BEFORE YOU GOT MONEY TO BUY MORE.: NEVER TRUE

## 2021-08-05 SDOH — ECONOMIC STABILITY: TRANSPORTATION INSECURITY
IN THE PAST 12 MONTHS, HAS LACK OF RELIABLE TRANSPORTATION KEPT YOU FROM MEDICAL APPOINTMENTS, MEETINGS, WORK OR FROM GETTING THINGS NEEDED FOR DAILY LIVING?: NO

## 2021-08-05 SDOH — HEALTH STABILITY: MENTAL HEALTH
STRESS IS WHEN SOMEONE FEELS TENSE, NERVOUS, ANXIOUS, OR CAN'T SLEEP AT NIGHT BECAUSE THEIR MIND IS TROUBLED. HOW STRESSED ARE YOU?: ONLY A LITTLE

## 2021-08-05 ASSESSMENT — SOCIAL DETERMINANTS OF HEALTH (SDOH)
DO YOU BELONG TO ANY CLUBS OR ORGANIZATIONS SUCH AS CHURCH GROUPS UNIONS, FRATERNAL OR ATHLETIC GROUPS, OR SCHOOL GROUPS?: NO
HOW OFTEN DO YOU GET TOGETHER WITH FRIENDS OR RELATIVES?: ONCE A WEEK
HOW OFTEN DO YOU GET TOGETHER WITH FRIENDS OR RELATIVES?: ONCE A WEEK
HOW MANY DRINKS CONTAINING ALCOHOL DO YOU HAVE ON A TYPICAL DAY WHEN YOU ARE DRINKING: 1 OR 2
HOW OFTEN DO YOU HAVE A DRINK CONTAINING ALCOHOL: MONTHLY OR LESS
IN A TYPICAL WEEK, HOW MANY TIMES DO YOU TALK ON THE PHONE WITH FAMILY, FRIENDS, OR NEIGHBORS?: MORE THAN THREE TIMES A WEEK
HOW OFTEN DO YOU HAVE SIX OR MORE DRINKS ON ONE OCCASION: NEVER
IN A TYPICAL WEEK, HOW MANY TIMES DO YOU TALK ON THE PHONE WITH FAMILY, FRIENDS, OR NEIGHBORS?: MORE THAN THREE TIMES A WEEK
HOW HARD IS IT FOR YOU TO PAY FOR THE VERY BASICS LIKE FOOD, HOUSING, MEDICAL CARE, AND HEATING?: SOMEWHAT HARD
HOW OFTEN DO YOU ATTEND CHURCH OR RELIGIOUS SERVICES?: NEVER
HOW OFTEN DO YOU ATTEND CHURCH OR RELIGIOUS SERVICES?: NEVER
ARE YOU MARRIED, WIDOWED, DIVORCED, SEPARATED, NEVER MARRIED, OR LIVING WITH A PARTNER?: LIVING WITH PARTNER
HOW OFTEN DO YOU ATTENT MEETINGS OF THE CLUB OR ORGANIZATION YOU BELONG TO?: NEVER
HOW OFTEN DO YOU ATTENT MEETINGS OF THE CLUB OR ORGANIZATION YOU BELONG TO?: NEVER
WITHIN THE PAST 12 MONTHS, YOU WORRIED THAT YOUR FOOD WOULD RUN OUT BEFORE YOU GOT THE MONEY TO BUY MORE: NEVER TRUE
ARE YOU MARRIED, WIDOWED, DIVORCED, SEPARATED, NEVER MARRIED, OR LIVING WITH A PARTNER?: LIVING WITH PARTNER
DO YOU BELONG TO ANY CLUBS OR ORGANIZATIONS SUCH AS CHURCH GROUPS UNIONS, FRATERNAL OR ATHLETIC GROUPS, OR SCHOOL GROUPS?: NO

## 2021-08-05 ASSESSMENT — LIFESTYLE VARIABLES
HOW OFTEN DO YOU HAVE SIX OR MORE DRINKS ON ONE OCCASION: NEVER
HOW OFTEN DO YOU HAVE A DRINK CONTAINING ALCOHOL: MONTHLY OR LESS
HOW MANY STANDARD DRINKS CONTAINING ALCOHOL DO YOU HAVE ON A TYPICAL DAY: 1 OR 2

## 2021-08-05 NOTE — PROGRESS NOTES
"Subjective:      Dieudonne Stokes is a 56 y.o. male who presents with Hypertension            HPI 1.  Essential hypertension-patient reports he has been compliant taking lisinopril 20 mg daily.  He has not checked any recent blood pressure readings.  Not reporting any chest pain or chest pressure.  2.  GERD-patient reports that he tends to have occasional symptoms of substernal burning but no epigastric pain.  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  In previous GI evaluation they were suggesting he have a upper endoscopy due to his moderately increased risk factors for Newell's esophagus.  He is not reporting any black or bloody stools.  Social history-,   Current medication-  Prior to Admission medications    Medication Sig Start Date End Date Taking? Authorizing Provider   lisinopril (PRINIVIL) 20 MG Tab TAKE ONE TABLET BY MOUTH ONCE DAILY 8/5/21  Yes Richard Arellano M.D.   vitamin D, Ergocalciferol, (DRISDOL) 1.25 MG (20742 UT) Cap capsule Take 1 capsule by mouth Q30 DAYS. 8/5/21  Yes Richard Arellano M.D.   famotidine (PEPCID) 40 MG Tab Take 1 tablet by mouth every day. 8/5/21  Yes Richard Arellano M.D.   lisinopril (PRINIVIL) 20 MG Tab Take 1 tablet by mouth once daily 6/21/21   Richard Arellano M.D.   vitamin D, Ergocalciferol, (DRISDOL) 56767 units Cap capsule Take 1 Cap by mouth Q30 DAYS. 9/5/19   CASSIE Keenan. Devices Misc Overnight oximetry, O2 concentrator to keep nocturnal O2 sats > 92% 8/23/17   CASSIE Keenan. [employer] Misc Overnight oximetry to recheck nocturnal hypoxia, concentrator to keep O2 above 90% 10/13/16   Glenroy Cody M.D.   Aspirin (ASPIR-81 PO) Take  by mouth.    Physician Outpatient       ROS negative for choking, diarrhea, cough, dyspnea       Objective:     /78   Pulse 72   Temp 35.9 °C (96.6 °F) (Temporal)   Resp 18   Ht 1.702 m (5' 7.01\")   Wt 104 " kg (230 lb)   SpO2 92%   BMI 36.01 kg/m²      Physical Exam      Gen.- alert, cooperative, in no acute distress  Neck- midline trachea, thyroid not enlarged or tender,supple, no cervical adenopathy  Chest-clear to auscultation and percussion with normal breath sounds. No retractions. Chest wall nontender  Cardiac- regular rhythm and rate. No murmur, thrill, or heave  Abdomen- normal bowel sounds, soft without guarding. Liver and spleen not enlarged, no palpable masses or tenderness.  Lower extremities-negative for ankle edema, redness, tenderness            Assessment/Plan:        1. Essential hypertension    - lisinopril (PRINIVIL) 20 MG Tab; TAKE ONE TABLET BY MOUTH ONCE DAILY  Dispense: 30 tablet; Refill: 11  - Comp Metabolic Panel; Future  - CBC WITH DIFFERENTIAL; Future    2. Elevated LDL cholesterol level    - Lipid Profile; Future    3. Gastroesophageal reflux disease with esophagitis, unspecified whether hemorrhage    - famotidine (PEPCID) 40 MG Tab; Take 1 tablet by mouth every day.  Dispense: 30 tablet; Refill: 11  - REFERRAL TO GASTROENTEROLOGY    4. Hyperglycemia    - vitamin D, Ergocalciferol, (DRISDOL) 1.25 MG (01130 UT) Cap capsule; Take 1 capsule by mouth Q30 DAYS.  Dispense: 4 capsule; Refill: 6    5. Screening for malignant neoplasm of prostate    - PROSTATE SPECIFIC AG SCREENING; Future    6. Obesity (BMI 30-39.9)    - Patient identified as having weight management issue.  Appropriate orders and counseling given.  Plan: 1.  GI referral for potential upper endoscopy due to risk of Newell's esophagus/long-term history of acid reflux  2.  Rx famotidine 40 mg daily  3.  Renew lisinopril, vitamin D  4.  Update labs  5.  Revisit with me in 6 months or sooner if needed

## 2021-10-25 ENCOUNTER — HOSPITAL ENCOUNTER (OUTPATIENT)
Dept: LAB | Facility: MEDICAL CENTER | Age: 56
End: 2021-10-25
Attending: FAMILY MEDICINE
Payer: MEDICAID

## 2021-10-25 ENCOUNTER — APPOINTMENT (OUTPATIENT)
Dept: LAB | Facility: MEDICAL CENTER | Age: 56
End: 2021-10-25
Payer: MEDICAID

## 2021-10-25 DIAGNOSIS — Z12.5 SCREENING FOR MALIGNANT NEOPLASM OF PROSTATE: ICD-10-CM

## 2021-10-25 DIAGNOSIS — E78.00 ELEVATED LDL CHOLESTEROL LEVEL: ICD-10-CM

## 2021-10-25 DIAGNOSIS — I10 ESSENTIAL HYPERTENSION: ICD-10-CM

## 2021-10-25 LAB
ALBUMIN SERPL BCP-MCNC: 4.4 G/DL (ref 3.2–4.9)
ALBUMIN/GLOB SERPL: 2 G/DL
ALP SERPL-CCNC: 55 U/L (ref 30–99)
ALT SERPL-CCNC: 22 U/L (ref 2–50)
ANION GAP SERPL CALC-SCNC: 13 MMOL/L (ref 7–16)
AST SERPL-CCNC: 13 U/L (ref 12–45)
BASOPHILS # BLD AUTO: 0.5 % (ref 0–1.8)
BASOPHILS # BLD: 0.03 K/UL (ref 0–0.12)
BILIRUB SERPL-MCNC: 0.5 MG/DL (ref 0.1–1.5)
BUN SERPL-MCNC: 12 MG/DL (ref 8–22)
CALCIUM SERPL-MCNC: 9.5 MG/DL (ref 8.5–10.5)
CHLORIDE SERPL-SCNC: 109 MMOL/L (ref 96–112)
CHOLEST SERPL-MCNC: 203 MG/DL (ref 100–199)
CO2 SERPL-SCNC: 21 MMOL/L (ref 20–33)
CREAT SERPL-MCNC: 0.71 MG/DL (ref 0.5–1.4)
EOSINOPHIL # BLD AUTO: 0.23 K/UL (ref 0–0.51)
EOSINOPHIL NFR BLD: 4.1 % (ref 0–6.9)
ERYTHROCYTE [DISTWIDTH] IN BLOOD BY AUTOMATED COUNT: 43.7 FL (ref 35.9–50)
GLOBULIN SER CALC-MCNC: 2.2 G/DL (ref 1.9–3.5)
GLUCOSE SERPL-MCNC: 120 MG/DL (ref 65–99)
HCT VFR BLD AUTO: 51.3 % (ref 42–52)
HDLC SERPL-MCNC: 43 MG/DL
HGB BLD-MCNC: 16.9 G/DL (ref 14–18)
IMM GRANULOCYTES # BLD AUTO: 0.02 K/UL (ref 0–0.11)
IMM GRANULOCYTES NFR BLD AUTO: 0.4 % (ref 0–0.9)
LDLC SERPL CALC-MCNC: 128 MG/DL
LYMPHOCYTES # BLD AUTO: 1.7 K/UL (ref 1–4.8)
LYMPHOCYTES NFR BLD: 30.5 % (ref 22–41)
MCH RBC QN AUTO: 30.6 PG (ref 27–33)
MCHC RBC AUTO-ENTMCNC: 32.9 G/DL (ref 33.7–35.3)
MCV RBC AUTO: 92.9 FL (ref 81.4–97.8)
MONOCYTES # BLD AUTO: 0.33 K/UL (ref 0–0.85)
MONOCYTES NFR BLD AUTO: 5.9 % (ref 0–13.4)
NEUTROPHILS # BLD AUTO: 3.26 K/UL (ref 1.82–7.42)
NEUTROPHILS NFR BLD: 58.6 % (ref 44–72)
NRBC # BLD AUTO: 0 K/UL
NRBC BLD-RTO: 0 /100 WBC
PLATELET # BLD AUTO: 182 K/UL (ref 164–446)
PMV BLD AUTO: 10.3 FL (ref 9–12.9)
POTASSIUM SERPL-SCNC: 4.7 MMOL/L (ref 3.6–5.5)
PROT SERPL-MCNC: 6.6 G/DL (ref 6–8.2)
PSA SERPL-MCNC: 3.49 NG/ML (ref 0–4)
RBC # BLD AUTO: 5.52 M/UL (ref 4.7–6.1)
SODIUM SERPL-SCNC: 143 MMOL/L (ref 135–145)
TRIGL SERPL-MCNC: 162 MG/DL (ref 0–149)
WBC # BLD AUTO: 5.6 K/UL (ref 4.8–10.8)

## 2021-10-25 PROCEDURE — 80053 COMPREHEN METABOLIC PANEL: CPT

## 2021-10-25 PROCEDURE — 80061 LIPID PANEL: CPT

## 2021-10-25 PROCEDURE — 36415 COLL VENOUS BLD VENIPUNCTURE: CPT

## 2021-10-25 PROCEDURE — 84153 ASSAY OF PSA TOTAL: CPT

## 2021-10-25 PROCEDURE — 85025 COMPLETE CBC W/AUTO DIFF WBC: CPT

## 2022-08-16 ENCOUNTER — OFFICE VISIT (OUTPATIENT)
Dept: MEDICAL GROUP | Facility: MEDICAL CENTER | Age: 57
End: 2022-08-16
Attending: FAMILY MEDICINE
Payer: MEDICAID

## 2022-08-16 VITALS
HEIGHT: 67 IN | SYSTOLIC BLOOD PRESSURE: 102 MMHG | WEIGHT: 232.6 LBS | DIASTOLIC BLOOD PRESSURE: 72 MMHG | RESPIRATION RATE: 17 BRPM | BODY MASS INDEX: 36.51 KG/M2 | TEMPERATURE: 97.3 F | HEART RATE: 85 BPM

## 2022-08-16 DIAGNOSIS — R73.9 HYPERGLYCEMIA: ICD-10-CM

## 2022-08-16 DIAGNOSIS — E66.9 OBESITY (BMI 30-39.9): ICD-10-CM

## 2022-08-16 DIAGNOSIS — K21.00 GASTROESOPHAGEAL REFLUX DISEASE WITH ESOPHAGITIS, UNSPECIFIED WHETHER HEMORRHAGE: ICD-10-CM

## 2022-08-16 DIAGNOSIS — E78.5 DYSLIPIDEMIA: ICD-10-CM

## 2022-08-16 DIAGNOSIS — I10 ESSENTIAL HYPERTENSION: ICD-10-CM

## 2022-08-16 PROCEDURE — 99212 OFFICE O/P EST SF 10 MIN: CPT | Performed by: FAMILY MEDICINE

## 2022-08-16 PROCEDURE — 99214 OFFICE O/P EST MOD 30 MIN: CPT | Performed by: FAMILY MEDICINE

## 2022-08-16 RX ORDER — ATORVASTATIN CALCIUM 10 MG/1
10 TABLET, FILM COATED ORAL NIGHTLY
Qty: 30 TABLET | Refills: 11 | Status: SHIPPED | OUTPATIENT
Start: 2022-08-16 | End: 2023-03-22 | Stop reason: SDUPTHER

## 2022-08-16 RX ORDER — LISINOPRIL 20 MG/1
TABLET ORAL
Qty: 30 TABLET | Refills: 6 | Status: SHIPPED | OUTPATIENT
Start: 2022-08-16 | End: 2023-03-22 | Stop reason: SDUPTHER

## 2022-08-16 RX ORDER — ERGOCALCIFEROL 1.25 MG/1
50000 CAPSULE ORAL
Qty: 4 CAPSULE | Refills: 3 | Status: SHIPPED | OUTPATIENT
Start: 2022-08-16 | End: 2023-03-22 | Stop reason: SDUPTHER

## 2022-08-16 RX ORDER — FAMOTIDINE 40 MG/1
40 TABLET, FILM COATED ORAL DAILY
Qty: 30 TABLET | Refills: 11 | Status: SHIPPED | OUTPATIENT
Start: 2022-08-16 | End: 2023-03-22 | Stop reason: SDUPTHER

## 2022-08-16 ASSESSMENT — PATIENT HEALTH QUESTIONNAIRE - PHQ9: CLINICAL INTERPRETATION OF PHQ2 SCORE: 0

## 2022-08-16 ASSESSMENT — FIBROSIS 4 INDEX: FIB4 SCORE: 0.85

## 2022-08-16 NOTE — PROGRESS NOTES
"Subjective     Dieudonne Chris Stokes is a 57 y.o. male who presents with Other (Medication check)            HPI 1.  Essential hypertension-patient reports he has been compliant remaining on lisinopril 20 mg over the past year.  Not reporting any unexplained chest pain, chest pressure or dry cough  2.  Dyslipidemia-patient has no past history of heart attack or stroke.  Current 10-year calculated risk based on last year's lipid panel is right at 10%.  He is open to trial of atorvastatin  3.  GERD-patient reports that he has had in the past epigastric and substernal burning.  He reports this has been well controlled since taking Pepcid 40 mg.  He has been on that consistently over the past year and not seen any breakthrough symptoms.    ROS negative for consistent nausea, emesis, black or bloody stools.           Objective     /72 (BP Location: Left arm, Patient Position: Sitting, BP Cuff Size: Adult)   Pulse 85   Temp 36.3 °C (97.3 °F) (Skin)   Resp 17   Ht 1.702 m (5' 7\")   Wt 106 kg (232 lb 9.6 oz)   BMI 36.43 kg/m²      Physical Exam     Gen.- alert, cooperative, in no acute distress  Neck- midline trachea, thyroid not enlarged or tender,supple, no cervical adenopathy  Chest-clear to auscultation and percussion with normal breath sounds. No retractions. Chest wall nontender  Cardiac- regular rhythm and rate. No murmur, thrill, or heave  Abdomen- normal bowel sounds, soft without guarding. Liver and spleen not enlarged, no palpable masses or tenderness.                   Assessment & Plan        1. Essential hypertension      2. Hyperglycemia      3. Gastroesophageal reflux disease with esophagitis, unspecified whether hemorrhage      4. Dyslipidemia    5. Obesity (BMI 30-39.9)    Plan: 1.  After discussion patient would like to try atorvastatin 10 mg daily  2.  Update CBC CMP and fasting lipids at this time  3.  Renew Pepcid, lisinopril, vitamin D  4.  Patient is encouraged to limit portions by 20% to " achieve gradual weight loss  5.  Revisit within 6 months

## 2022-08-22 ENCOUNTER — HOSPITAL ENCOUNTER (OUTPATIENT)
Dept: LAB | Facility: MEDICAL CENTER | Age: 57
End: 2022-08-22
Attending: FAMILY MEDICINE
Payer: MEDICAID

## 2022-08-22 DIAGNOSIS — I10 ESSENTIAL HYPERTENSION: ICD-10-CM

## 2022-08-22 DIAGNOSIS — E78.5 DYSLIPIDEMIA: ICD-10-CM

## 2022-08-22 LAB
ALBUMIN SERPL BCP-MCNC: 4.4 G/DL (ref 3.2–4.9)
ALBUMIN/GLOB SERPL: 1.9 G/DL
ALP SERPL-CCNC: 54 U/L (ref 30–99)
ALT SERPL-CCNC: 29 U/L (ref 2–50)
ANION GAP SERPL CALC-SCNC: 10 MMOL/L (ref 7–16)
AST SERPL-CCNC: 16 U/L (ref 12–45)
BASOPHILS # BLD AUTO: 0.3 % (ref 0–1.8)
BASOPHILS # BLD: 0.02 K/UL (ref 0–0.12)
BILIRUB SERPL-MCNC: 0.6 MG/DL (ref 0.1–1.5)
BUN SERPL-MCNC: 16 MG/DL (ref 8–22)
CALCIUM SERPL-MCNC: 9.5 MG/DL (ref 8.5–10.5)
CHLORIDE SERPL-SCNC: 101 MMOL/L (ref 96–112)
CHOLEST SERPL-MCNC: 162 MG/DL (ref 100–199)
CO2 SERPL-SCNC: 25 MMOL/L (ref 20–33)
CREAT SERPL-MCNC: 0.96 MG/DL (ref 0.5–1.4)
EOSINOPHIL # BLD AUTO: 0.24 K/UL (ref 0–0.51)
EOSINOPHIL NFR BLD: 3.7 % (ref 0–6.9)
ERYTHROCYTE [DISTWIDTH] IN BLOOD BY AUTOMATED COUNT: 44 FL (ref 35.9–50)
GFR SERPLBLD CREATININE-BSD FMLA CKD-EPI: 93 ML/MIN/1.73 M 2
GLOBULIN SER CALC-MCNC: 2.3 G/DL (ref 1.9–3.5)
GLUCOSE SERPL-MCNC: 115 MG/DL (ref 65–99)
HCT VFR BLD AUTO: 51.1 % (ref 42–52)
HDLC SERPL-MCNC: 44 MG/DL
HGB BLD-MCNC: 16.9 G/DL (ref 14–18)
IMM GRANULOCYTES # BLD AUTO: 0.03 K/UL (ref 0–0.11)
IMM GRANULOCYTES NFR BLD AUTO: 0.5 % (ref 0–0.9)
LDLC SERPL CALC-MCNC: 91 MG/DL
LYMPHOCYTES # BLD AUTO: 1.77 K/UL (ref 1–4.8)
LYMPHOCYTES NFR BLD: 27.1 % (ref 22–41)
MCH RBC QN AUTO: 30.8 PG (ref 27–33)
MCHC RBC AUTO-ENTMCNC: 33.1 G/DL (ref 33.7–35.3)
MCV RBC AUTO: 93.2 FL (ref 81.4–97.8)
MONOCYTES # BLD AUTO: 0.4 K/UL (ref 0–0.85)
MONOCYTES NFR BLD AUTO: 6.1 % (ref 0–13.4)
NEUTROPHILS # BLD AUTO: 4.06 K/UL (ref 1.82–7.42)
NEUTROPHILS NFR BLD: 62.3 % (ref 44–72)
NRBC # BLD AUTO: 0 K/UL
NRBC BLD-RTO: 0 /100 WBC
PLATELET # BLD AUTO: 210 K/UL (ref 164–446)
PMV BLD AUTO: 9.6 FL (ref 9–12.9)
POTASSIUM SERPL-SCNC: 4.6 MMOL/L (ref 3.6–5.5)
PROT SERPL-MCNC: 6.7 G/DL (ref 6–8.2)
RBC # BLD AUTO: 5.48 M/UL (ref 4.7–6.1)
SODIUM SERPL-SCNC: 136 MMOL/L (ref 135–145)
TRIGL SERPL-MCNC: 134 MG/DL (ref 0–149)
WBC # BLD AUTO: 6.5 K/UL (ref 4.8–10.8)

## 2022-08-22 PROCEDURE — 80061 LIPID PANEL: CPT

## 2022-08-22 PROCEDURE — 85025 COMPLETE CBC W/AUTO DIFF WBC: CPT

## 2022-08-22 PROCEDURE — 36415 COLL VENOUS BLD VENIPUNCTURE: CPT

## 2022-08-22 PROCEDURE — 80053 COMPREHEN METABOLIC PANEL: CPT

## 2023-03-15 DIAGNOSIS — I10 ESSENTIAL HYPERTENSION: ICD-10-CM

## 2023-03-15 PROCEDURE — 1126F AMNT PAIN NOTED NONE PRSNT: CPT | Performed by: FAMILY MEDICINE

## 2023-03-22 ENCOUNTER — OFFICE VISIT (OUTPATIENT)
Dept: MEDICAL GROUP | Facility: MEDICAL CENTER | Age: 58
End: 2023-03-22
Attending: FAMILY MEDICINE
Payer: MEDICAID

## 2023-03-22 VITALS
RESPIRATION RATE: 16 BRPM | HEIGHT: 67 IN | HEART RATE: 88 BPM | OXYGEN SATURATION: 94 % | DIASTOLIC BLOOD PRESSURE: 70 MMHG | SYSTOLIC BLOOD PRESSURE: 142 MMHG | WEIGHT: 247 LBS | BODY MASS INDEX: 38.77 KG/M2 | TEMPERATURE: 97.2 F

## 2023-03-22 DIAGNOSIS — K21.00 GASTROESOPHAGEAL REFLUX DISEASE WITH ESOPHAGITIS, UNSPECIFIED WHETHER HEMORRHAGE: ICD-10-CM

## 2023-03-22 DIAGNOSIS — I10 ESSENTIAL HYPERTENSION: ICD-10-CM

## 2023-03-22 DIAGNOSIS — E78.5 DYSLIPIDEMIA: ICD-10-CM

## 2023-03-22 DIAGNOSIS — G47.34 NOCTURNAL HYPOXIA: ICD-10-CM

## 2023-03-22 DIAGNOSIS — R73.9 HYPERGLYCEMIA: ICD-10-CM

## 2023-03-22 PROBLEM — S29.019A: Status: RESOLVED | Noted: 2019-02-12 | Resolved: 2023-03-22

## 2023-03-22 PROCEDURE — 99214 OFFICE O/P EST MOD 30 MIN: CPT | Performed by: FAMILY MEDICINE

## 2023-03-22 PROCEDURE — 99213 OFFICE O/P EST LOW 20 MIN: CPT | Performed by: FAMILY MEDICINE

## 2023-03-22 RX ORDER — ERGOCALCIFEROL 1.25 MG/1
50000 CAPSULE ORAL
Qty: 4 CAPSULE | Refills: 3 | Status: SHIPPED | OUTPATIENT
Start: 2023-03-22 | End: 2023-12-07

## 2023-03-22 RX ORDER — ATORVASTATIN CALCIUM 10 MG/1
10 TABLET, FILM COATED ORAL NIGHTLY
Qty: 30 TABLET | Refills: 11 | Status: SHIPPED | OUTPATIENT
Start: 2023-03-22 | End: 2023-08-01 | Stop reason: SDUPTHER

## 2023-03-22 RX ORDER — FAMOTIDINE 20 MG/1
20 TABLET, FILM COATED ORAL NIGHTLY
Qty: 30 TABLET | Refills: 2 | Status: SHIPPED | OUTPATIENT
Start: 2023-03-22 | End: 2023-04-21

## 2023-03-22 RX ORDER — LISINOPRIL 20 MG/1
TABLET ORAL
Qty: 30 TABLET | Refills: 6 | Status: SHIPPED | OUTPATIENT
Start: 2023-03-22 | End: 2023-05-15

## 2023-03-22 SDOH — ECONOMIC STABILITY: HOUSING INSECURITY
IN THE LAST 12 MONTHS, WAS THERE A TIME WHEN YOU DID NOT HAVE A STEADY PLACE TO SLEEP OR SLEPT IN A SHELTER (INCLUDING NOW)?: NO

## 2023-03-22 SDOH — ECONOMIC STABILITY: INCOME INSECURITY: IN THE LAST 12 MONTHS, WAS THERE A TIME WHEN YOU WERE NOT ABLE TO PAY THE MORTGAGE OR RENT ON TIME?: NO

## 2023-03-22 SDOH — ECONOMIC STABILITY: FOOD INSECURITY: WITHIN THE PAST 12 MONTHS, THE FOOD YOU BOUGHT JUST DIDN'T LAST AND YOU DIDN'T HAVE MONEY TO GET MORE.: NEVER TRUE

## 2023-03-22 SDOH — ECONOMIC STABILITY: FOOD INSECURITY: WITHIN THE PAST 12 MONTHS, YOU WORRIED THAT YOUR FOOD WOULD RUN OUT BEFORE YOU GOT MONEY TO BUY MORE.: SOMETIMES TRUE

## 2023-03-22 SDOH — ECONOMIC STABILITY: INCOME INSECURITY: HOW HARD IS IT FOR YOU TO PAY FOR THE VERY BASICS LIKE FOOD, HOUSING, MEDICAL CARE, AND HEATING?: HARD

## 2023-03-22 SDOH — HEALTH STABILITY: PHYSICAL HEALTH: ON AVERAGE, HOW MANY MINUTES DO YOU ENGAGE IN EXERCISE AT THIS LEVEL?: 150+ MIN

## 2023-03-22 SDOH — HEALTH STABILITY: MENTAL HEALTH
STRESS IS WHEN SOMEONE FEELS TENSE, NERVOUS, ANXIOUS, OR CAN'T SLEEP AT NIGHT BECAUSE THEIR MIND IS TROUBLED. HOW STRESSED ARE YOU?: TO SOME EXTENT

## 2023-03-22 SDOH — HEALTH STABILITY: PHYSICAL HEALTH: ON AVERAGE, HOW MANY DAYS PER WEEK DO YOU ENGAGE IN MODERATE TO STRENUOUS EXERCISE (LIKE A BRISK WALK)?: 7 DAYS

## 2023-03-22 SDOH — ECONOMIC STABILITY: HOUSING INSECURITY: IN THE LAST 12 MONTHS, HOW MANY PLACES HAVE YOU LIVED?: 1

## 2023-03-22 ASSESSMENT — SOCIAL DETERMINANTS OF HEALTH (SDOH)
HOW OFTEN DO YOU ATTENT MEETINGS OF THE CLUB OR ORGANIZATION YOU BELONG TO?: NEVER
HOW OFTEN DO YOU GET TOGETHER WITH FRIENDS OR RELATIVES?: MORE THAN THREE TIMES A WEEK
HOW MANY DRINKS CONTAINING ALCOHOL DO YOU HAVE ON A TYPICAL DAY WHEN YOU ARE DRINKING: 1 OR 2
IN A TYPICAL WEEK, HOW MANY TIMES DO YOU TALK ON THE PHONE WITH FAMILY, FRIENDS, OR NEIGHBORS?: MORE THAN THREE TIMES A WEEK
HOW OFTEN DO YOU HAVE SIX OR MORE DRINKS ON ONE OCCASION: NEVER
HOW HARD IS IT FOR YOU TO PAY FOR THE VERY BASICS LIKE FOOD, HOUSING, MEDICAL CARE, AND HEATING?: HARD
ARE YOU MARRIED, WIDOWED, DIVORCED, SEPARATED, NEVER MARRIED, OR LIVING WITH A PARTNER?: LIVING WITH PARTNER
HOW OFTEN DO YOU ATTEND CHURCH OR RELIGIOUS SERVICES?: NEVER
HOW OFTEN DO YOU ATTEND CHURCH OR RELIGIOUS SERVICES?: NEVER
HOW OFTEN DO YOU HAVE A DRINK CONTAINING ALCOHOL: MONTHLY OR LESS
IN A TYPICAL WEEK, HOW MANY TIMES DO YOU TALK ON THE PHONE WITH FAMILY, FRIENDS, OR NEIGHBORS?: MORE THAN THREE TIMES A WEEK
DO YOU BELONG TO ANY CLUBS OR ORGANIZATIONS SUCH AS CHURCH GROUPS UNIONS, FRATERNAL OR ATHLETIC GROUPS, OR SCHOOL GROUPS?: NO
HOW OFTEN DO YOU GET TOGETHER WITH FRIENDS OR RELATIVES?: MORE THAN THREE TIMES A WEEK
DO YOU BELONG TO ANY CLUBS OR ORGANIZATIONS SUCH AS CHURCH GROUPS UNIONS, FRATERNAL OR ATHLETIC GROUPS, OR SCHOOL GROUPS?: NO
WITHIN THE PAST 12 MONTHS, YOU WORRIED THAT YOUR FOOD WOULD RUN OUT BEFORE YOU GOT THE MONEY TO BUY MORE: SOMETIMES TRUE
HOW OFTEN DO YOU ATTENT MEETINGS OF THE CLUB OR ORGANIZATION YOU BELONG TO?: NEVER
ARE YOU MARRIED, WIDOWED, DIVORCED, SEPARATED, NEVER MARRIED, OR LIVING WITH A PARTNER?: LIVING WITH PARTNER

## 2023-03-22 ASSESSMENT — LIFESTYLE VARIABLES
AUDIT-C TOTAL SCORE: 1
HOW OFTEN DO YOU HAVE A DRINK CONTAINING ALCOHOL: MONTHLY OR LESS
HOW MANY STANDARD DRINKS CONTAINING ALCOHOL DO YOU HAVE ON A TYPICAL DAY: 1 OR 2
SKIP TO QUESTIONS 9-10: 1
HOW OFTEN DO YOU HAVE SIX OR MORE DRINKS ON ONE OCCASION: NEVER

## 2023-03-22 ASSESSMENT — FIBROSIS 4 INDEX: FIB4 SCORE: 0.81

## 2023-03-22 ASSESSMENT — PATIENT HEALTH QUESTIONNAIRE - PHQ9: CLINICAL INTERPRETATION OF PHQ2 SCORE: 0

## 2023-03-23 NOTE — PROGRESS NOTES
Subjective:     CC:    Chief Complaint   Patient presents with    [facility]    Hypertension       HISTORY OF THE PRESENT ILLNESS: Patient is a 58 y.o. male. This pleasant patient is here today to establish primary care with me and discuss the following issues.   His prior PCP was Richard Arellano MD ; last seen 22    Specialists   none    Essential hypertension  Does not normally check his bp's at home  Reports compliance with lisinopril daily  He rushed to today's appointment which is why he thinks its on the higher side      Hyperlipidemia  Reports compliance with Lipitor daily    Nocturnal hypoxia  Currently using an O2 concentrator      Allergies: Patient has no known allergies.      [facility] [address] [place] [address] - [address] [id number] [facility] [address]  Phone: [phone] Fax: [phone]      Current Outpatient Medications   Medication Sig Dispense Refill    lisinopril (PRINIVIL) 20 MG Tab TAKE ONE TABLET BY MOUTH ONCE DAILY 30 Tablet 6    famotidine (PEPCID) 20 MG Tab Take 1 Tablet by mouth every evening for 30 days. 30 Tablet 2    atorvastatin (LIPITOR) 10 MG Tab Take 1 Tablet by mouth every evening. 30 Tablet 11    vitamin D2, Ergocalciferol, (DRISDOL) 1.25 MG (51117 UT) Cap capsule Take 1 Capsule by mouth Q30 DAYS. 4 Capsule 3    Misc. Devices Misc Overnight oximetry, O2 concentrator to keep nocturnal O2 sats > 92% 1 Device 0    Aspirin (ASPIR-81 PO) Take  by mouth. (Patient not taking: Reported on 3/22/2023)       No current facility-administered medications for this visit.       Past Medical History:   Diagnosis Date    GERD (gastroesophageal reflux disease)     Hypertension        History reviewed. No pertinent surgical history.    Social History     Tobacco Use    Smoking status: Former     Packs/day: 1.00     Years: 39.00     Pack years: 39.00     Types: Cigarettes     Start date: 1979     Quit date: 2018     Years since quittin.2    Smokeless  "tobacco: Never    Tobacco comments:     1ppd   Substance Use Topics    Alcohol use: No    Drug use: No       Family History   Problem Relation Age of Onset    Hypertension Mother     Cancer Maternal Grandmother        ROS  Gen: no fevers/chills  CV: no chest pain  Pulm: no shortness of breath          Objective:     BP (!) 142/70   Pulse 88   Temp 36.2 °C (97.2 °F)   Resp 16   Ht 1.702 m (5' 7.01\")   Wt 112 kg (247 lb)   SpO2 94%   BMI 38.68 kg/m²   Physical Exam  Constitutional: Alert, no distress  Skin: No rashes in visible areas  Eye: Conjunctiva clear, lids normal  Respiratory: Unlabored respiratory effort, no cough, lungs clear to auscultation bilaterally  MSK: Normal gait, moves all extremities  Neuro: Grossly non-focal   Psych: Alert and oriented x3, normal affect and mood      Assessment & Plan:   58 y.o. male with the following -    1. Essential hypertension  - Chronic generally well controlled; BP acceptable today  - Encouraged patient to monitor BP readings and to return sooner if BP persistently above 140/90  - lisinopril (PRINIVIL) 20 MG Tab; TAKE ONE TABLET BY MOUTH ONCE DAILY  Dispense: 30 Tablet; Refill: 6  - Encouraged patient to follow heart healthy diet and lifestyle focusing on more of a plant predominant or Mediterranean diet    2. Gastroesophageal reflux disease with esophagitis, unspecified whether hemorrhage  - Chronic; clinically stable  - Encouraged to avoid triggers, avoid laying down soon after meals; avoid meals 3 hours prior to bedtime  - famotidine (PEPCID) 20 MG Tab; Take 1 Tablet by mouth every evening for 30 days.  Dispense: 30 Tablet; Refill: 2    3. Dyslipidemia  - Chronic; clinically stable  - As above; re-check labs in 6 months  - atorvastatin (LIPITOR) 10 MG Tab; Take 1 Tablet by mouth every evening.  Dispense: 30 Tablet; Refill: 11    4. Nocturnal hypoxia  - Referral to Pulmonary and Sleep Medicine for repeat sleep study     Return in about 6 months (around 9/22/2023) " for chronic condition follow up.    Please note that this dictation was created using voice recognition software. I have made every reasonable attempt to correct obvious errors, but I expect that there are errors of grammar and possibly content that I did not discover before finalizing the note.

## 2023-03-23 NOTE — ASSESSMENT & PLAN NOTE
Does not normally check his bp's at home  Reports compliance with lisinopril daily  He rushed to today's appointment which is why he thinks its on the higher side

## 2023-05-15 RX ORDER — LISINOPRIL 20 MG/1
TABLET ORAL
Qty: 30 TABLET | Refills: 0 | Status: SHIPPED | OUTPATIENT
Start: 2023-05-15 | End: 2023-10-02

## 2023-08-30 DIAGNOSIS — I10 ESSENTIAL HYPERTENSION: ICD-10-CM

## 2023-08-30 DIAGNOSIS — E78.5 DYSLIPIDEMIA: ICD-10-CM

## 2023-08-30 DIAGNOSIS — Z11.3 SCREEN FOR STD (SEXUALLY TRANSMITTED DISEASE): ICD-10-CM

## 2023-08-30 DIAGNOSIS — R73.9 HYPERGLYCEMIA: ICD-10-CM

## 2023-09-06 ENCOUNTER — HOSPITAL ENCOUNTER (OUTPATIENT)
Dept: LAB | Facility: MEDICAL CENTER | Age: 58
End: 2023-09-06
Attending: FAMILY MEDICINE
Payer: MEDICAID

## 2023-09-06 DIAGNOSIS — E78.5 DYSLIPIDEMIA: ICD-10-CM

## 2023-09-06 DIAGNOSIS — Z11.3 SCREEN FOR STD (SEXUALLY TRANSMITTED DISEASE): ICD-10-CM

## 2023-09-06 DIAGNOSIS — I10 ESSENTIAL HYPERTENSION: ICD-10-CM

## 2023-09-06 DIAGNOSIS — R73.9 HYPERGLYCEMIA: ICD-10-CM

## 2023-09-06 LAB
ALBUMIN SERPL BCP-MCNC: 4.2 G/DL (ref 3.2–4.9)
ALBUMIN/GLOB SERPL: 1.8 G/DL
ALP SERPL-CCNC: 70 U/L (ref 30–99)
ALT SERPL-CCNC: 38 U/L (ref 2–50)
ANION GAP SERPL CALC-SCNC: 9 MMOL/L (ref 7–16)
AST SERPL-CCNC: 25 U/L (ref 12–45)
BASOPHILS # BLD AUTO: 0.5 % (ref 0–1.8)
BASOPHILS # BLD: 0.03 K/UL (ref 0–0.12)
BILIRUB SERPL-MCNC: 0.4 MG/DL (ref 0.1–1.5)
BUN SERPL-MCNC: 18 MG/DL (ref 8–22)
CALCIUM ALBUM COR SERPL-MCNC: 8.9 MG/DL (ref 8.5–10.5)
CALCIUM SERPL-MCNC: 9.1 MG/DL (ref 8.5–10.5)
CHLORIDE SERPL-SCNC: 102 MMOL/L (ref 96–112)
CHOLEST SERPL-MCNC: 140 MG/DL (ref 100–199)
CO2 SERPL-SCNC: 23 MMOL/L (ref 20–33)
CREAT SERPL-MCNC: 0.84 MG/DL (ref 0.5–1.4)
EOSINOPHIL # BLD AUTO: 0.23 K/UL (ref 0–0.51)
EOSINOPHIL NFR BLD: 3.8 % (ref 0–6.9)
ERYTHROCYTE [DISTWIDTH] IN BLOOD BY AUTOMATED COUNT: 44.3 FL (ref 35.9–50)
EST. AVERAGE GLUCOSE BLD GHB EST-MCNC: 148 MG/DL
FASTING STATUS PATIENT QL REPORTED: NORMAL
GFR SERPLBLD CREATININE-BSD FMLA CKD-EPI: 101 ML/MIN/1.73 M 2
GLOBULIN SER CALC-MCNC: 2.3 G/DL (ref 1.9–3.5)
GLUCOSE SERPL-MCNC: 139 MG/DL (ref 65–99)
HBA1C MFR BLD: 6.8 % (ref 4–5.6)
HCT VFR BLD AUTO: 52.6 % (ref 42–52)
HCV AB SER QL: NORMAL
HDLC SERPL-MCNC: 35 MG/DL
HGB BLD-MCNC: 17 G/DL (ref 14–18)
HIV 1+2 AB+HIV1 P24 AG SERPL QL IA: NORMAL
IMM GRANULOCYTES # BLD AUTO: 0.03 K/UL (ref 0–0.11)
IMM GRANULOCYTES NFR BLD AUTO: 0.5 % (ref 0–0.9)
LDLC SERPL CALC-MCNC: 68 MG/DL
LYMPHOCYTES # BLD AUTO: 1.86 K/UL (ref 1–4.8)
LYMPHOCYTES NFR BLD: 30.5 % (ref 22–41)
MCH RBC QN AUTO: 30 PG (ref 27–33)
MCHC RBC AUTO-ENTMCNC: 32.3 G/DL (ref 32.3–36.5)
MCV RBC AUTO: 92.8 FL (ref 81.4–97.8)
MONOCYTES # BLD AUTO: 0.45 K/UL (ref 0–0.85)
MONOCYTES NFR BLD AUTO: 7.4 % (ref 0–13.4)
NEUTROPHILS # BLD AUTO: 3.49 K/UL (ref 1.82–7.42)
NEUTROPHILS NFR BLD: 57.3 % (ref 44–72)
NRBC # BLD AUTO: 0 K/UL
NRBC BLD-RTO: 0 /100 WBC (ref 0–0.2)
PLATELET # BLD AUTO: 207 K/UL (ref 164–446)
PMV BLD AUTO: 9.8 FL (ref 9–12.9)
POTASSIUM SERPL-SCNC: 4.4 MMOL/L (ref 3.6–5.5)
PROT SERPL-MCNC: 6.5 G/DL (ref 6–8.2)
RBC # BLD AUTO: 5.67 M/UL (ref 4.7–6.1)
SODIUM SERPL-SCNC: 134 MMOL/L (ref 135–145)
TRIGL SERPL-MCNC: 183 MG/DL (ref 0–149)
WBC # BLD AUTO: 6.1 K/UL (ref 4.8–10.8)

## 2023-09-06 PROCEDURE — 86803 HEPATITIS C AB TEST: CPT

## 2023-09-06 PROCEDURE — 87389 HIV-1 AG W/HIV-1&-2 AB AG IA: CPT

## 2023-09-06 PROCEDURE — 80061 LIPID PANEL: CPT

## 2023-09-06 PROCEDURE — 83036 HEMOGLOBIN GLYCOSYLATED A1C: CPT

## 2023-09-06 PROCEDURE — 85025 COMPLETE CBC W/AUTO DIFF WBC: CPT

## 2023-09-06 PROCEDURE — 36415 COLL VENOUS BLD VENIPUNCTURE: CPT

## 2023-09-06 PROCEDURE — 80053 COMPREHEN METABOLIC PANEL: CPT

## 2023-12-07 ENCOUNTER — OFFICE VISIT (OUTPATIENT)
Dept: MEDICAL GROUP | Facility: MEDICAL CENTER | Age: 58
End: 2023-12-07
Attending: FAMILY MEDICINE
Payer: MEDICAID

## 2023-12-07 ENCOUNTER — HOSPITAL ENCOUNTER (OUTPATIENT)
Dept: LAB | Facility: MEDICAL CENTER | Age: 58
End: 2023-12-07
Attending: FAMILY MEDICINE
Payer: MEDICAID

## 2023-12-07 VITALS
RESPIRATION RATE: 16 BRPM | WEIGHT: 247 LBS | OXYGEN SATURATION: 92 % | DIASTOLIC BLOOD PRESSURE: 72 MMHG | HEIGHT: 67 IN | BODY MASS INDEX: 38.77 KG/M2 | HEART RATE: 76 BPM | SYSTOLIC BLOOD PRESSURE: 118 MMHG | TEMPERATURE: 97.5 F

## 2023-12-07 DIAGNOSIS — R73.9 HYPERGLYCEMIA: ICD-10-CM

## 2023-12-07 DIAGNOSIS — R39.9 LOWER URINARY TRACT SYMPTOMS (LUTS): ICD-10-CM

## 2023-12-07 DIAGNOSIS — Z23 NEED FOR VACCINATION: ICD-10-CM

## 2023-12-07 DIAGNOSIS — L82.1 SEBORRHEIC KERATOSES: ICD-10-CM

## 2023-12-07 LAB
EST. AVERAGE GLUCOSE BLD GHB EST-MCNC: 157 MG/DL
HBA1C MFR BLD: 7.1 % (ref 4–5.6)
PSA SERPL-MCNC: 12.9 NG/ML (ref 0–4)

## 2023-12-07 PROCEDURE — 90471 IMMUNIZATION ADMIN: CPT

## 2023-12-07 PROCEDURE — 36415 COLL VENOUS BLD VENIPUNCTURE: CPT

## 2023-12-07 PROCEDURE — 83036 HEMOGLOBIN GLYCOSYLATED A1C: CPT

## 2023-12-07 PROCEDURE — 84153 ASSAY OF PSA TOTAL: CPT

## 2023-12-07 PROCEDURE — 99214 OFFICE O/P EST MOD 30 MIN: CPT | Performed by: FAMILY MEDICINE

## 2023-12-07 PROCEDURE — 99213 OFFICE O/P EST LOW 20 MIN: CPT | Mod: 25 | Performed by: FAMILY MEDICINE

## 2023-12-07 RX ORDER — ZOSTER VACCINE RECOMBINANT, ADJUVANTED 50 MCG/0.5
KIT INTRAMUSCULAR
Qty: 0.5 ML | Refills: 1 | Status: SHIPPED | OUTPATIENT
Start: 2023-12-07 | End: 2024-03-07

## 2023-12-07 ASSESSMENT — FIBROSIS 4 INDEX: FIB4 SCORE: 1.12

## 2023-12-07 NOTE — PROGRESS NOTES
Subjective   No chief complaint on file.       HPI:   Ta presents today for follow up and with concerns regarding moles.    Since last visit patient had routine labs done which came back significant for elevated A1c as well as much improved cholesterol levels.  Regards to diabetic test he admits to eating primarily fast food.  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  States he is very busy throughout the day and when it comes time to eat will often rely on convenience food.  His wife is also on disability which makes cooking from home challenging.  He states that when lab was last done he had been drinking a lot of sweetened iced tea.  However since then has stopped consuming this.  He endorses eating predominantly fast food meat and potatoes.  Does not tend to eat a lot of fruits or vegetables.  He also has not concerns regarding moles that have developed over the past few years.  He denies any associated bleeding, poor wound healing or associated itch to the lesions.  Ta also has some concerns regarding issues with urination.  States that he has been having more issues with frequent urgency, dribbling, will often have to use bathroom multiple times at night.  Denies any family history of prostate cancer.  Denies any blood in the urine. He has been in his normal state of health otherwise.      Health Maintenance Due   Topic Date Due    Hepatitis B Vaccine (Hep B) (1 of 3 - 3-dose series) Never done    Zoster (Shingles) Vaccines (1 of 2) Never done    Lung Cancer Screening Shared Decision Making  Never done    Influenza Vaccine (1) 2023    COVID-19 Vaccine (3 - 2023-24 season) 2023       Objective   Social History     Tobacco Use    Smoking status: Former     Current packs/day: 0.00     Average packs/day: 1 pack/day for 39.0 years (39.0 ttl pk-yrs)     Types: Cigarettes     Start date: 1979     Quit date: 2018     Years since quittin.9    Smokeless tobacco: Never     Tobacco comments:     1ppd   Substance Use Topics    Alcohol use: No    Drug use: No       Exam:  There were no vitals taken for this visit.    Physical Exam  Constitutional: Alert, no distress  Skin:               Eye: Conjunctiva clear, lids normal  Respiratory: Unlabored respiratory effort, no cough  MSK: Normal gait, moves all extremities  Psych: Alert and oriented x3, normal affect and mood    No Known Allergies    [facility] [address] [place] [address] [address] [facility]  [address] [facility] [address]  Phone: [phone] Fax: [phone]    Current Outpatient Medications   Medication Sig Dispense Refill    Zoster Vac Recomb Adjuvanted (SHINGRIX) 50 MCG/0.5ML Recon Susp Inject 0.5 mL into the shoulder, thigh, or buttock at 0 month and 3 months 0.5 mL 1    lisinopril (PRINIVIL) 20 MG Tab Take 1 tablet by mouth once daily 90 Tablet 1    atorvastatin (LIPITOR) 10 MG Tab Take 1 Tablet by mouth every evening. 90 Tablet 3    Misc. Devices Misc Overnight oximetry, O2 concentrator to keep nocturnal O2 sats > 92% 1 Device 0     No current facility-administered medications for this visit.     Hospital Outpatient Visit on 09/06/2023   Component Date Value Ref Range Status    WBC 09/06/2023 6.1  4.8 - 10.8 K/uL Final    RBC 09/06/2023 5.67  4.70 - 6.10 M/uL Final    Hemoglobin 09/06/2023 17.0  14.0 - 18.0 g/dL Final    Hematocrit 09/06/2023 52.6 (H)  42.0 - 52.0 % Final    MCV 09/06/2023 92.8  81.4 - 97.8 fL Final    MCH 09/06/2023 30.0  27.0 - 33.0 pg Final    MCHC 09/06/2023 32.3  32.3 - 36.5 g/dL Final    Please note new reference range effective 05/22/2023.    RDW 09/06/2023 44.3  35.9 - 50.0 fL Final    Platelet Count 09/06/2023 207  164 - 446 K/uL Final    MPV 09/06/2023 9.8  9.0 - 12.9 fL Final    Neutrophils-Polys 09/06/2023 57.30  44.00 - 72.00 % Final    Lymphocytes 09/06/2023 30.50  22.00 - 41.00 % Final    Monocytes 09/06/2023 7.40  0.00 - 13.40 % Final    Eosinophils 09/06/2023 3.80  0.00 - 6.90  % Final    Basophils 09/06/2023 0.50  0.00 - 1.80 % Final    Immature Granulocytes 09/06/2023 0.50  0.00 - 0.90 % Final    Nucleated RBC 09/06/2023 0.00  0.00 - 0.20 /100 WBC Final    Please note new reference range effective 05/22/2023.    Neutrophils (Absolute) 09/06/2023 3.49  1.82 - 7.42 K/uL Final    Comment: Includes immature neutrophils, if present.  Please note new reference range effective 05/22/2023.      Lymphs (Absolute) 09/06/2023 1.86  1.00 - 4.80 K/uL Final    Monos (Absolute) 09/06/2023 0.45  0.00 - 0.85 K/uL Final    Eos (Absolute) 09/06/2023 0.23  0.00 - 0.51 K/uL Final    Baso (Absolute) 09/06/2023 0.03  0.00 - 0.12 K/uL Final    Immature Granulocytes (abs) 09/06/2023 0.03  0.00 - 0.11 K/uL Final    NRBC (Absolute) 09/06/2023 0.00  K/uL Final    Sodium 09/06/2023 134 (L)  135 - 145 mmol/L Final    Potassium 09/06/2023 4.4  3.6 - 5.5 mmol/L Final    Chloride 09/06/2023 102  96 - 112 mmol/L Final    Co2 09/06/2023 23  20 - 33 mmol/L Final    Anion Gap 09/06/2023 9.0  7.0 - 16.0 Final    Glucose 09/06/2023 139 (H)  65 - 99 mg/dL Final    Bun 09/06/2023 18  8 - 22 mg/dL Final    Creatinine 09/06/2023 0.84  0.50 - 1.40 mg/dL Final    Calcium 09/06/2023 9.1  8.5 - 10.5 mg/dL Final    Correct Calcium 09/06/2023 8.9  8.5 - 10.5 mg/dL Final    AST(SGOT) 09/06/2023 25  12 - 45 U/L Final    ALT(SGPT) 09/06/2023 38  2 - 50 U/L Final    Alkaline Phosphatase 09/06/2023 70  30 - 99 U/L Final    Total Bilirubin 09/06/2023 0.4  0.1 - 1.5 mg/dL Final    Albumin 09/06/2023 4.2  3.2 - 4.9 g/dL Final    Total Protein 09/06/2023 6.5  6.0 - 8.2 g/dL Final    Globulin 09/06/2023 2.3  1.9 - 3.5 g/dL Final    A-G Ratio 09/06/2023 1.8  g/dL Final    Hepatitis C Antibody 09/06/2023 Non-Reactive  Non-Reactive Final    Comment: Antibodies to HCV were not detected.  The Roche anti-HCV is a diagnostic test for the qualitative determination of  antibodies to the hepatitis C virus in human serum and plasma. The results  should be  used and interpreted only in the context of the overall clinical  picture. A negative test result does not exclude the possibility of exposure  to hepatitis C virus.  Note: Assay performance characteristics have not been established in  populations of immunocompromised or immunosuppressed patients.      Cholesterol,Tot 09/06/2023 140  100 - 199 mg/dL Final    Triglycerides 09/06/2023 183 (H)  0 - 149 mg/dL Final    HDL 09/06/2023 35 (A)  >=40 mg/dL Final    LDL 09/06/2023 68  <100 mg/dL Final    Glycohemoglobin 09/06/2023 6.8 (H)  4.0 - 5.6 % Final    Comment: Increased risk for diabetes:  5.7 -6.4%  Diabetes:  >6.4%  Glycemic control for adults with diabetes:  <7.0%    The above interpretations are per ADA guidelines.  Diagnosis  of diabetes mellitus on the basis of elevated Hemoglobin A1c  should be confirmed by repeating the Hb A1c test.      Est Avg Glucose 09/06/2023 148  mg/dL Final    Comment: The eAG calculation is based on the A1c-Derived Daily Glucose  (ADAG) study.  See the ADA's website for additional information.      HIV Ag/Ab Combo Assay 09/06/2023 Non-Reactive  Non Reactive Final    Comment: Roche HIV is a diagnostic test for the qualitative determination of HIV-1  p24 antigen and antibodies to HIV-1 (HIV-1 groups M and O) and HIV-2 in  human serum and plasma. A negative screen does not preclude the possibility  of exposure or infection. Consider retesting in high-risk patients, if  clinically indicated.      Fasting Status 09/06/2023 Fasting   Final    GFR (CKD-EPI) 09/06/2023 101  >60 mL/min/1.73 m 2 Final    Comment: Estimated Glomerular Filtration Rate is calculated using  race neutral CKD-EPI 2021 equation per NKF-ASN recommendations.           Assessment & Plan    58 y.o. male with the following -     1. Seborrheic keratoses  -Acute on chronic; clinically stable.  - Discussed that physical exam findings are consistent with above diagnosis.  Reassured patient that condition is benign and not  infectious or contagious.  - Reviewed signs and symptoms concerning for more malignant process.  - Recommend continued clinical monitoring and consider referral to dermatology in the future for full skin exam.  2. Hyperglycemia  - Reviewed results as above in detail  - Discussed A1c returns within  T2DM range; however recommend repeat to insure accuracy of result  - Recommend intensive lifestyle modifications to help progression including consumption of:  nutrient-dense foods that are high in fiber (? 14 g of fiber per 1,000 kcal), minimally processed carbohydrates (such as non-starchy vegetables, fruits, legumes, and whole grains), and non-dairy products with minimally added sugar over intake from other carbohydrate sources   Plant based or Mediterranean-style diet rich in monounsaturated and polyunsaturated fats to improve glucose metabolism and reduce cardiovascular disease risk   foods rich in omega-3 fatty acids (such as fatty fish, nuts, and seeds) to prevent/treat cardiovascular disease   replace consumption of sugar-sweetened beverages (including fruit juices) with water or low calorie, no calorie beverages in order to control glycemia and reduce risk for cardiometabolic disease   minimize consumption of foods with added sugars   - Advised avoidance of using cigarettes, e-cigarettes, or other tobacco products  - HEMOGLOBIN A1C; Future  - Pending results will further discuss pharmacological treatment options    3. Lower urinary tract symptoms (LUTS)  - Chronic; uncontrolled  - Discussed symptoms likely 2/2 BPH. Patient Inquired about prostate cancer screening.  He denies any known family history of prostate cancer.  Discussed that levels naturally rise with age.  However reasonable to perform one-time screening to obtain baseline.  - PROSTATE SPECIFIC AG SCREENING; Future    4. Need for vaccination  - INFLUENZA VACCINE QUAD INJ (PF)  - Zoster Vac Recomb Adjuvanted (SHINGRIX) 50 MCG/0.5ML Recon Susp; Inject  0.5 mL into the shoulder, thigh, or buttock at 0 month and 3 months  Dispense: 0.5 mL; Refill: 1      Return pending results.    Please note that this dictation was created using voice recognition software. I have made every reasonable attempt to correct obvious errors, but I expect that there are errors of grammar and possibly content that I did not discover before finalizing the note.

## 2023-12-08 DIAGNOSIS — R97.20 ELEVATED PSA, BETWEEN 10 AND LESS THAN 20 NG/ML: ICD-10-CM

## 2023-12-08 DIAGNOSIS — R39.9 LOWER URINARY TRACT SYMPTOMS (LUTS): ICD-10-CM

## 2024-03-07 ENCOUNTER — OFFICE VISIT (OUTPATIENT)
Dept: MEDICAL GROUP | Facility: MEDICAL CENTER | Age: 59
End: 2024-03-07
Attending: FAMILY MEDICINE
Payer: MEDICAID

## 2024-03-07 VITALS
OXYGEN SATURATION: 90 % | HEIGHT: 67 IN | BODY MASS INDEX: 38.3 KG/M2 | RESPIRATION RATE: 16 BRPM | TEMPERATURE: 97.5 F | WEIGHT: 244 LBS | SYSTOLIC BLOOD PRESSURE: 90 MMHG | DIASTOLIC BLOOD PRESSURE: 60 MMHG | HEART RATE: 81 BPM

## 2024-03-07 DIAGNOSIS — I48.91 ATRIAL FIBRILLATION, UNSPECIFIED TYPE (HCC): ICD-10-CM

## 2024-03-07 DIAGNOSIS — C61 CARCINOMA OF PROSTATE (HCC): Chronic | ICD-10-CM

## 2024-03-07 DIAGNOSIS — M62.838 NECK MUSCLE SPASM: ICD-10-CM

## 2024-03-07 DIAGNOSIS — G47.34 NOCTURNAL HYPOXIA: ICD-10-CM

## 2024-03-07 DIAGNOSIS — Z71.89 COMPLEX CARE COORDINATION: ICD-10-CM

## 2024-03-07 PROBLEM — N40.1 LOWER URINARY TRACT SYMPTOMS DUE TO BENIGN PROSTATIC HYPERPLASIA: Status: ACTIVE | Noted: 2024-02-22

## 2024-03-07 PROCEDURE — 3078F DIAST BP <80 MM HG: CPT | Performed by: FAMILY MEDICINE

## 2024-03-07 PROCEDURE — 99214 OFFICE O/P EST MOD 30 MIN: CPT | Performed by: FAMILY MEDICINE

## 2024-03-07 PROCEDURE — 99213 OFFICE O/P EST LOW 20 MIN: CPT | Performed by: FAMILY MEDICINE

## 2024-03-07 PROCEDURE — 3074F SYST BP LT 130 MM HG: CPT | Performed by: FAMILY MEDICINE

## 2024-03-07 RX ORDER — CYCLOBENZAPRINE HCL 10 MG
10 TABLET ORAL 3 TIMES DAILY PRN
Qty: 30 TABLET | Refills: 1 | Status: SHIPPED | OUTPATIENT
Start: 2024-03-07

## 2024-03-07 RX ORDER — PSYLLIUM HUSK 0.4 G
CAPSULE ORAL
COMMUNITY

## 2024-03-07 RX ORDER — TAMSULOSIN HYDROCHLORIDE 0.4 MG/1
1 CAPSULE ORAL
COMMUNITY
Start: 2023-12-14

## 2024-03-07 ASSESSMENT — FIBROSIS 4 INDEX: FIB4 SCORE: 1.12

## 2024-03-07 ASSESSMENT — PATIENT HEALTH QUESTIONNAIRE - PHQ9: CLINICAL INTERPRETATION OF PHQ2 SCORE: 0

## 2024-03-07 NOTE — PROGRESS NOTES
"Verbal consent was acquired by the patient to use [employer] ambient listening note generation during this visit.    Subjective   Chief Complaint   Patient presents with    Follow-Up        HPI:   Ta presents today with    History of Present Illness  The patient is a 59-year-old male who presents for follow-up.  Since his last visit with me in December has been diagnosed with biopsy-proven prostate cancer in which workup and diagnosis was prompted after PSA level came back significantly elevated. He had multiple tests done and started treatment for prostate cancer with Firmagon. He has an appointment tomorrow for imaging, bone density, and MRI. He received 2 injections in his abdomen 2.5 weeks ago. He will have more lab work in 2 weeks and then in 3 weeks he will go back and see his urologist again to discuss results of the imaging and decide on treatment.  He is at stage 4 prostate cancer.    After he had the biopsy done, they told him that his blood pressure was really low and his heart was going into Afib. He had a hard time coming up out of the local anesthesia. His heart rate kept going really slow and his heart rate would jump back up high again. He does get a lot of shortness of breath, but he always attributed it to being a past smoker, but it is getting worse. Occasionally, he feels like his heart is \"empty for a couple of seconds\". It happens every now and then.    About 3 weeks ago, he woke up with a kink in his neck. He was fine, but skilled nursing through the workday, he got a kink in his neck and it is still there. Sometimes, he can barely reach above his head or if he reaches out with his arm, it will pull really bad. It is red. It goes down to barely underneath his shoulder blade. He has not tried anything to help. He has just tried to work it out or stretch it out. It goes all the way down to his shoulder and then down behind his shoulder blades. He puts patches on it every now and then.    He has lower " "back pain all the time. He is wondering if it is related to his prostate.    Health Maintenance Due   Topic Date Due    Hepatitis B Vaccine (Hep B) (1 of 3 - 3-dose series) Never done    Zoster (Shingles) Vaccines (1 of 2) Never done    Lung Cancer Screening Shared Decision Making  Never done    COVID-19 Vaccine (3 - 2023- season) 2023       Objective   Social History     Tobacco Use    Smoking status: Former     Current packs/day: 0.00     Average packs/day: 1 pack/day for 39.0 years (39.0 ttl pk-yrs)     Types: Cigarettes     Start date: 1979     Quit date: 2018     Years since quittin.1    Smokeless tobacco: Never    Tobacco comments:     1ppd   Vaping Use    Vaping Use: Never used   Substance Use Topics    Alcohol use: Yes     Comment: once in awhile    Drug use: No       Exam:  BP 90/60 (BP Location: Left arm, Patient Position: Sitting, BP Cuff Size: Adult)   Pulse 81   Temp 36.4 °C (97.5 °F) (Temporal)   Resp 16   Ht 1.702 m (5' 7\")   Wt 111 kg (244 lb)   SpO2 90%   BMI 38.22 kg/m²     Physical Exam  Constitutional: Alert, no distress  Skin: No rashes in visible areas  Eye: Conjunctiva clear, lids normal  Respiratory: Unlabored respiratory effort, no cough  MSK: Normal gait, moves all extremities, positive muscle spasm over right paracervical area. Negative Spurgling's.  strength intact bilateral   Psych: Alert and oriented x3, tearful affect and mood    No Known Allergies    [facility] [address] [place] [address] - [facility] [address]  Phone: [phone] Fax: [phone]    Current Outpatient Medications   Medication Sig Dispense Refill    tamsulosin (FLOMAX) 0.4 MG capsule Take 1 Capsule by mouth at bedtime.      cyclobenzaprine (FLEXERIL) 10 mg Tab Take 1 Tablet by mouth 3 times a day as needed for Muscle Spasms. 30 Tablet 1    syringe MISC 4 mL with degarelix 80 MG SOLR 80 mg Inject 80 mg under the skin one time.      Calcium " Carb-Cholecalciferol (CALCIUM 1000 + D) 1000-20 MG-MCG Tab Take  by mouth.      lisinopril (PRINIVIL) 20 MG Tab Take 1 tablet by mouth once daily 90 Tablet 1    atorvastatin (LIPITOR) 10 MG Tab Take 1 Tablet by mouth every evening. 90 Tablet 3    Misc. Devices Misc Overnight oximetry, O2 concentrator to keep nocturnal O2 sats > 92% 1 Device 0     No current facility-administered medications for this visit.       Assessment & Plan    58 y.o. male with the following -   1. Carcinoma of prostate (HCC)  cyclobenzaprine (FLEXERIL) 10 mg Tab      2. Atrial fibrillation, unspecified type (HCC)  Cardiac Event Monitor    EC-ECHOCARDIOGRAM COMPLETE W/O CONT      3. Nocturnal hypoxia  Referral to Pulmonary and Sleep Medicine      4. Neck muscle spasm  tamsulosin (FLOMAX) 0.4 MG capsule    cyclobenzaprine (FLEXERIL) 10 mg Tab      5. Complex care coordination  REFERRAL TO CARE MANAGEMENT          Assessment & Plan  1. Carcinoma of prostate.  This is a newly diagnosed condition for patient. Biopsy performed 01/17/2025 confirmed malignancy. He is currently under the management of urology under ANGEL Kaufman as well as Dr. Hal Ricci MD. He has started on treatment with Firmagon. He is awaiting further bone scan to help guide additional treatment options. He has follow-up already scheduled. We will continue to follow along peripherally.    2. Atrial fibrillation.  This is a new concern for patient. Unable to find any documentation that correlates with documented atrial fibrillation. : Given patient report and upcoming cancer treatment, recommend further evaluation regarding cardiac status with Holter monitoring and ultrasound.    3. Nocturnal hypoxia  This is a chronic condition for patient.  Has not been formally evaluated in many years.  Recommend further evaluation through sleep specialist for updated diagnostic and treatment recommendations.    4. Neck spasm  Discussed that history and physical consistent with  diagnosis. I recommend initiation of muscle relaxer for symptom relief. I encouraged continued topical treatment as tolerated.    5. Complex care coordination.  Given newly diagnosed cancer and anticipated treatment, patient will likely be unable to perform work at his full capacity in the near future. He is currently a self-employed owner of a  shop. Patient is requesting assistance to help obtain disability. Resources provided to him via find help.org. Referral also placed for care coordination team to see if they might be able to assist him with this as well.    Follow-up  The patient will follow up in 2 to 3 months.      Return in about 3 months (around 6/7/2024) for chronic condition follow up.    Please note that this dictation was created using voice recognition software. I have made every reasonable attempt to correct obvious errors, but I expect that there are errors of grammar and possibly content that I did not discover before finalizing the note.

## 2024-03-08 ENCOUNTER — HOSPITAL ENCOUNTER (OUTPATIENT)
Dept: RADIOLOGY | Facility: MEDICAL CENTER | Age: 59
End: 2024-03-08
Attending: UROLOGY
Payer: MEDICAID

## 2024-03-08 DIAGNOSIS — C61 MALIGNANT NEOPLASM OF PROSTATE (HCC): ICD-10-CM

## 2024-03-08 PROCEDURE — 72197 MRI PELVIS W/O & W/DYE: CPT

## 2024-03-08 PROCEDURE — A9503 TC99M MEDRONATE: HCPCS

## 2024-03-08 PROCEDURE — 700117 HCHG RX CONTRAST REV CODE 255: Mod: UD | Performed by: UROLOGY

## 2024-03-08 PROCEDURE — 700111 HCHG RX REV CODE 636 W/ 250 OVERRIDE (IP): Mod: JZ,UD | Performed by: RADIOLOGY

## 2024-03-08 PROCEDURE — A9579 GAD-BASE MR CONTRAST NOS,1ML: HCPCS | Mod: UD | Performed by: UROLOGY

## 2024-03-08 RX ADMIN — GLUCAGON 1 MG: 1 INJECTION, POWDER, LYOPHILIZED, FOR SOLUTION INTRAMUSCULAR; INTRAVENOUS at 12:58

## 2024-03-08 RX ADMIN — GADOTERIDOL 20 ML: 279.3 INJECTION, SOLUTION INTRAVENOUS at 13:29

## 2024-03-12 ENCOUNTER — PATIENT OUTREACH (OUTPATIENT)
Dept: HEALTH INFORMATION MANAGEMENT | Facility: OTHER | Age: 59
End: 2024-03-12
Payer: MEDICAID

## 2024-03-12 NOTE — PROGRESS NOTES
Community Health Worker Intake     Synopsis: Chw received referral from patient's PCP to help with applying for or obtaining disability. Chw completed intake with patient over phone and Ta states he was recently diagnosed with stage 4 prostate cancer. He recently had imaging done and will follow up with results tomorrow. Pt states he has been out of work full time since November of 2023, but has had a couple side jobs for cash here and there.This is nothing stable as far as income goes. Pt also states he has started the application process of applying for disability online, but is still needing additional medical documentation to complete this. Chw informed patient he could refer him to a local agency to help Ta with the ongoing completion of disability. Chw informed patient [employer] is a local agency that can assist him with applying. Chw informed patient he will text him the contact info, after the call. Chw will also main out the patient [employer] info, in the case the patient needs food assistance. Pt states he is doing ok and has the help of his girlfriend and her income. Pt endorses use of O2 concentrator but no other medical equipment.      [employer] info text to pt. [employer] application, Care management info mailed out.     Living Situation/Home Environment: Lives with girlfriend   Food Source/Diet: No immediate needs at this time.   Transportation: Pt active , girlfriend  Financial Situation/Sources of Income: Side job income occasionally   Support System: Significant other   Medical Equipment: O2 concentrator   Confidence in Managing Health: Strong/Somewhat/None at all. Somewhat   Other resources needed: Food Rx application      Plan: Follow up outreach call to patient on 3/15

## 2024-03-19 ENCOUNTER — HOSPITAL ENCOUNTER (OUTPATIENT)
Dept: LAB | Facility: MEDICAL CENTER | Age: 59
End: 2024-03-19
Attending: UROLOGY
Payer: MEDICAID

## 2024-03-19 ENCOUNTER — PATIENT OUTREACH (OUTPATIENT)
Dept: HEALTH INFORMATION MANAGEMENT | Facility: OTHER | Age: 59
End: 2024-03-19
Payer: MEDICAID

## 2024-03-19 ENCOUNTER — HOSPITAL ENCOUNTER (OUTPATIENT)
Dept: LAB | Facility: MEDICAL CENTER | Age: 59
End: 2024-03-19
Attending: PHYSICIAN ASSISTANT
Payer: MEDICAID

## 2024-03-19 LAB
ALBUMIN SERPL BCP-MCNC: 4.1 G/DL (ref 3.2–4.9)
ALBUMIN/GLOB SERPL: 2 G/DL
ALP SERPL-CCNC: 66 U/L (ref 30–99)
ALT SERPL-CCNC: 30 U/L (ref 2–50)
ANION GAP SERPL CALC-SCNC: 12 MMOL/L (ref 7–16)
AST SERPL-CCNC: 20 U/L (ref 12–45)
BILIRUB SERPL-MCNC: 0.4 MG/DL (ref 0.1–1.5)
BUN SERPL-MCNC: 15 MG/DL (ref 8–22)
CALCIUM ALBUM COR SERPL-MCNC: 9.7 MG/DL (ref 8.5–10.5)
CALCIUM SERPL-MCNC: 9.8 MG/DL (ref 8.5–10.5)
CHLORIDE SERPL-SCNC: 101 MMOL/L (ref 96–112)
CO2 SERPL-SCNC: 21 MMOL/L (ref 20–33)
CREAT SERPL-MCNC: 0.76 MG/DL (ref 0.5–1.4)
GFR SERPLBLD CREATININE-BSD FMLA CKD-EPI: 104 ML/MIN/1.73 M 2
GLOBULIN SER CALC-MCNC: 2.1 G/DL (ref 1.9–3.5)
GLUCOSE SERPL-MCNC: 117 MG/DL (ref 65–99)
POTASSIUM SERPL-SCNC: 4.7 MMOL/L (ref 3.6–5.5)
PROT SERPL-MCNC: 6.2 G/DL (ref 6–8.2)
PSA SERPL-MCNC: 1.53 NG/ML (ref 0–4)
PSA SERPL-MCNC: 1.54 NG/ML (ref 0–4)
SODIUM SERPL-SCNC: 134 MMOL/L (ref 135–145)
TESTOST SERPL-MCNC: 12 NG/DL (ref 175–781)

## 2024-03-19 PROCEDURE — 84153 ASSAY OF PSA TOTAL: CPT

## 2024-03-19 PROCEDURE — 84403 ASSAY OF TOTAL TESTOSTERONE: CPT

## 2024-03-19 PROCEDURE — 80053 COMPREHEN METABOLIC PANEL: CPT

## 2024-03-19 PROCEDURE — 36415 COLL VENOUS BLD VENIPUNCTURE: CPT

## 2024-03-19 PROCEDURE — 84153 ASSAY OF PSA TOTAL: CPT | Mod: 91

## 2024-03-19 NOTE — PROGRESS NOTES
Follow up call to patient regarding resources mailed out. Pt states he received them yesterday but has not had a chance to go through them. Pt requested follow up call later this week, as pt was currently getting blood work done.     Plan: Chw to contact pt on 3/22/24    3/21/24- Follow up outreach to pt. Ta states he did receive the resource info this chw mailed and text to him, but has not yet followed up with it. Pt states he has been busy recently applying for disability over the phone, with the social security office. He mentions they are helping him through the application process. Pt encouraged to contact [employer] for questions specific to the application process of disability. Pt also encouraged to contact this chw for any additional ongoing care management needs. Pt thanked me for the follow up and denies further care needs. Ccm contact info text to patient.

## 2024-04-03 ENCOUNTER — TELEPHONE (OUTPATIENT)
Dept: HEALTH INFORMATION MANAGEMENT | Facility: OTHER | Age: 59
End: 2024-04-03
Payer: MEDICAID

## 2024-04-03 ENCOUNTER — NON-PROVIDER VISIT (OUTPATIENT)
Dept: CARDIOLOGY | Facility: MEDICAL CENTER | Age: 59
End: 2024-04-03
Attending: FAMILY MEDICINE
Payer: MEDICAID

## 2024-04-03 DIAGNOSIS — I10 ESSENTIAL HYPERTENSION: ICD-10-CM

## 2024-04-03 RX ORDER — LISINOPRIL 20 MG/1
TABLET ORAL
Qty: 90 TABLET | Refills: 0 | Status: SHIPPED | OUTPATIENT
Start: 2024-04-03

## 2024-04-03 NOTE — PROGRESS NOTES
Home enrollment completed for the 30 day [employer] Heart monitoring program per Natalya Ortega MD..  >Monitor shipped to patient by [facility]  >Pending Baseline.  >Pending EOS.

## 2024-04-03 NOTE — TELEPHONE ENCOUNTER
Received request via: Pharmacy    Was the patient seen in the last year in this department? Yes    Does the patient have an active prescription (recently filled or refills available) for medication(s) requested? No    Pharmacy Name: [facility]    Does the patient have custodial Plus and need 100 day supply (blood pressure, diabetes and cholesterol meds only)? Patient does not have SCP    Future Appointments         Provider Department [place]    4/9/2024 2:00 PM (Arrive by 1:45 PM) [facility]. - ECHOCARDIOLOGY [facility].    6/17/2024 1:40 PM (Arrive by 1:25 PM) Natalya Ortega M.D. [facility]

## 2024-04-09 ENCOUNTER — ANCILLARY PROCEDURE (OUTPATIENT)
Dept: CARDIOLOGY | Facility: MEDICAL CENTER | Age: 59
End: 2024-04-09
Attending: FAMILY MEDICINE
Payer: MEDICAID

## 2024-04-09 LAB — LV EJECT FRACT  99904: 55

## 2024-04-09 PROCEDURE — 93306 TTE W/DOPPLER COMPLETE: CPT | Mod: 26 | Performed by: INTERNAL MEDICINE

## 2024-04-09 PROCEDURE — 93306 TTE W/DOPPLER COMPLETE: CPT

## 2024-04-10 ENCOUNTER — TELEPHONE (OUTPATIENT)
Dept: CARDIOLOGY | Facility: MEDICAL CENTER | Age: 59
End: 2024-04-10
Payer: MEDICAID

## 2024-04-10 DIAGNOSIS — I48.92 ATRIAL FLUTTER, UNSPECIFIED TYPE (HCC): ICD-10-CM

## 2024-04-10 NOTE — PROGRESS NOTES
Holter showing a flutter - new dx  Called pt, reports some dizziness upon standing but no chest pain.   Chads 2 vasc = 1 for htn, but pt has newly diagnosed prostate cancer undergoing treatment so he is at increased risk of blood clot  Urgent cards referral placed  Will reach out to Dr. Bloch for AC recs  Advised pt to present to ER with any symptoms of chest pain, palpitations, sob, hemoptysis, s/s of DVT of the leg, persistent dizziness/lightheadedness.

## 2024-04-12 ENCOUNTER — OFFICE VISIT (OUTPATIENT)
Dept: CARDIOLOGY | Facility: MEDICAL CENTER | Age: 59
End: 2024-04-12
Attending: FAMILY MEDICINE
Payer: MEDICAID

## 2024-04-12 VITALS
RESPIRATION RATE: 12 BRPM | OXYGEN SATURATION: 90 % | DIASTOLIC BLOOD PRESSURE: 70 MMHG | BODY MASS INDEX: 38.45 KG/M2 | HEART RATE: 101 BPM | SYSTOLIC BLOOD PRESSURE: 104 MMHG | HEIGHT: 67 IN | WEIGHT: 245 LBS

## 2024-04-12 DIAGNOSIS — I10 HTN (HYPERTENSION), MALIGNANT: ICD-10-CM

## 2024-04-12 DIAGNOSIS — Z79.899 HIGH RISK MEDICATION USE: ICD-10-CM

## 2024-04-12 DIAGNOSIS — I48.92 ATRIAL FLUTTER, UNSPECIFIED TYPE (HCC): ICD-10-CM

## 2024-04-12 DIAGNOSIS — E78.00 PURE HYPERCHOLESTEROLEMIA: ICD-10-CM

## 2024-04-12 DIAGNOSIS — I48.19 PERSISTENT ATRIAL FIBRILLATION (HCC): ICD-10-CM

## 2024-04-12 DIAGNOSIS — E11.65 TYPE 2 DIABETES MELLITUS WITH HYPERGLYCEMIA, WITHOUT LONG-TERM CURRENT USE OF INSULIN (HCC): ICD-10-CM

## 2024-04-12 DIAGNOSIS — D68.69 HYPERCOAGULABLE STATE DUE TO PERSISTENT ATRIAL FIBRILLATION (HCC): ICD-10-CM

## 2024-04-12 DIAGNOSIS — I48.19 HYPERCOAGULABLE STATE DUE TO PERSISTENT ATRIAL FIBRILLATION (HCC): ICD-10-CM

## 2024-04-12 DIAGNOSIS — Z87.891 FORMER SMOKER: ICD-10-CM

## 2024-04-12 LAB — EKG IMPRESSION: NORMAL

## 2024-04-12 PROCEDURE — 93005 ELECTROCARDIOGRAM TRACING: CPT | Performed by: INTERNAL MEDICINE

## 2024-04-12 PROCEDURE — 99205 OFFICE O/P NEW HI 60 MIN: CPT | Performed by: INTERNAL MEDICINE

## 2024-04-12 PROCEDURE — 3078F DIAST BP <80 MM HG: CPT | Performed by: INTERNAL MEDICINE

## 2024-04-12 PROCEDURE — 99212 OFFICE O/P EST SF 10 MIN: CPT | Performed by: INTERNAL MEDICINE

## 2024-04-12 PROCEDURE — G2211 COMPLEX E/M VISIT ADD ON: HCPCS | Performed by: INTERNAL MEDICINE

## 2024-04-12 PROCEDURE — 3074F SYST BP LT 130 MM HG: CPT | Performed by: INTERNAL MEDICINE

## 2024-04-12 PROCEDURE — 93010 ELECTROCARDIOGRAM REPORT: CPT | Performed by: INTERNAL MEDICINE

## 2024-04-12 RX ORDER — EMPAGLIFLOZIN 10 MG/1
10 TABLET, FILM COATED ORAL DAILY
Qty: 90 TABLET | Refills: 4 | Status: SHIPPED | OUTPATIENT
Start: 2024-04-12 | End: 2024-04-19 | Stop reason: CLARIF

## 2024-04-12 RX ORDER — NEBIVOLOL 5 MG/1
5 TABLET ORAL DAILY
Qty: 30 TABLET | Refills: 11 | Status: SHIPPED | OUTPATIENT
Start: 2024-04-12 | End: 2024-04-19

## 2024-04-12 ASSESSMENT — ENCOUNTER SYMPTOMS
ORTHOPNEA: 0
CLAUDICATION: 0
MYALGIAS: 0
EYE PAIN: 0
EYE DISCHARGE: 0
BLURRED VISION: 0
PALPITATIONS: 1
ABDOMINAL PAIN: 0
FALLS: 0
SPEECH CHANGE: 0
DEPRESSION: 0
BLOOD IN STOOL: 0
WEIGHT LOSS: 0
SENSORY CHANGE: 0
VOMITING: 0
CHILLS: 0
BRUISES/BLEEDS EASILY: 0
NAUSEA: 0
HALLUCINATIONS: 0
SHORTNESS OF BREATH: 0
HEADACHES: 0
DIZZINESS: 0
LOSS OF CONSCIOUSNESS: 0
PND: 0
COUGH: 0
FEVER: 0
DOUBLE VISION: 0

## 2024-04-12 ASSESSMENT — FIBROSIS 4 INDEX: FIB4 SCORE: 1.02

## 2024-04-12 NOTE — PROGRESS NOTES
Chief Complaint   Patient presents with    Hyperlipidemia    Hypertension       Subjective     Ta Chris Stokes is a 59 y.o. male who presents today for cardiac care due to new onset of atrial fibrillation seen on event monitor ordered by his primary care physician.  Patient is largely asymptomatic.  No prior cardiac problems no prior cardiac procedure or surgery.  Used to be a smoker but quit 6 years ago.    I personally interpreted all tracing of his event monitor.    I have independently interpreted and reviewed echocardiogram's actual images with patient which showed normal left ventricular systolic function. No wall motion abnormality. No evidence of pulmonary hypertension. No significant valvular disease.    I personally interpreted blood test results which showed elevated glycohemoglobin of 7.1, LDL of 68, triglyceride 183, GFR of 104, potassium 4.7.    Past Medical History:   Diagnosis Date    GERD (gastroesophageal reflux disease)     Hypertension      History reviewed. No pertinent surgical history.  Family History   Problem Relation Age of Onset    Hypertension Mother     Cancer Maternal Grandmother      Social History     Socioeconomic History    Marital status: Single     Spouse name: Not on file    Number of children: Not on file    Years of education: Not on file    Highest education level: Associate degree: occupational, technical, or vocational program   Occupational History    Not on file   Tobacco Use    Smoking status: Former     Current packs/day: 0.00     Average packs/day: 1 pack/day for 39.0 years (39.0 ttl pk-yrs)     Types: Cigarettes     Start date: 1979     Quit date: 2018     Years since quittin.2    Smokeless tobacco: Never    Tobacco comments:     1ppd   Vaping Use    Vaping Use: Never used   Substance and Sexual Activity    Alcohol use: Yes     Comment: once in awhile    Drug use: No    Sexual activity: Not Currently     Partners: Female   Other Topics Concern    Not on  file   Social History Narrative    Not on file     Social Determinants of Health     Financial Resource Strain: High Risk (3/22/2023)    Overall Financial Resource Strain (CARDIA)     Difficulty of Paying Living Expenses: Hard   Food Insecurity: Food Insecurity Present (3/22/2023)    Hunger Vital Sign     Worried About Running Out of Food in the Last Year: Sometimes true     Ran Out of Food in the Last Year: Never true   Transportation Needs: No Transportation Needs (3/22/2023)    PRAPARE - Transportation     Lack of Transportation (Medical): No     Lack of Transportation (Non-Medical): No   Physical Activity: Sufficiently Active (3/22/2023)    Exercise Vital Sign     Days of Exercise per Week: 7 days     Minutes of Exercise per Session: 150+ min   Stress: Stress Concern Present (3/22/2023)    [employer] [place] [employer] - Occupational Stress Questionnaire     Feeling of Stress : To some extent   Social Connections: Moderately Isolated (3/22/2023)    Social Connection and Isolation Panel [NHANES]     Frequency of Communication with Friends and Family: More than three times a week     Frequency of Social Gatherings with Friends and Family: More than three times a week     Attends Buddhism Services: Never     Active Member of Clubs or Organizations: No     Attends Club or Organization Meetings: Never     Marital Status: Living with partner   Intimate Partner Violence: Not on file   Housing Stability: Low Risk  (3/22/2023)    Housing Stability Vital Sign     Unable to Pay for Housing in the Last Year: No     Number of Places Lived in the Last Year: 1     Unstable Housing in the Last Year: No     No Known Allergies  Outpatient Encounter Medications as of 4/12/2024   Medication Sig Dispense Refill    Empagliflozin (JARDIANCE) 10 MG Tab tablet Take 1 Tablet by mouth every day. 90 Tablet 4    apixaban (ELIQUIS) 5mg Tab Take 1 Tablet by mouth 2 times a day. 60 Tablet 11    nebivolol (BYSTOLIC) 5 MG Tab tablet  "Take 1 Tablet by mouth every day. 30 Tablet 11    lisinopril (PRINIVIL) 20 MG Tab Take 1 tablet by mouth once daily 90 Tablet 0    tamsulosin (FLOMAX) 0.4 MG capsule Take 1 Capsule by mouth at bedtime.      cyclobenzaprine (FLEXERIL) 10 mg Tab Take 1 Tablet by mouth 3 times a day as needed for Muscle Spasms. 30 Tablet 1    syringe MISC 4 mL with degarelix 80 MG SOLR 80 mg Inject 80 mg under the skin one time.      Calcium Carb-Cholecalciferol (CALCIUM 1000 + D) 1000-20 MG-MCG Tab Take  by mouth.      atorvastatin (LIPITOR) 10 MG Tab Take 1 Tablet by mouth every evening. 90 Tablet 3    Misc. Devices Misc Overnight oximetry, O2 concentrator to keep nocturnal O2 sats > 92% 1 Device 0     No facility-administered encounter medications on file as of 4/12/2024.     Review of Systems   Constitutional:  Negative for chills, fever, malaise/fatigue and weight loss.   HENT:  Negative for ear discharge, ear pain, hearing loss and nosebleeds.    Eyes:  Negative for blurred vision, double vision, pain and discharge.   Respiratory:  Negative for cough and shortness of breath.    Cardiovascular:  Positive for palpitations. Negative for chest pain, orthopnea, claudication, leg swelling and PND.   Gastrointestinal:  Negative for abdominal pain, blood in stool, melena, nausea and vomiting.   Genitourinary:  Negative for dysuria and hematuria.   Musculoskeletal:  Negative for falls, joint pain and myalgias.   Skin:  Negative for itching and rash.   Neurological:  Negative for dizziness, sensory change, speech change, loss of consciousness and headaches.   Endo/Heme/Allergies:  Negative for environmental allergies. Does not bruise/bleed easily.   Psychiatric/Behavioral:  Negative for depression, hallucinations and suicidal ideas.               Objective     /70 (BP Location: Left arm, Patient Position: Sitting, BP Cuff Size: Adult)   Pulse (!) 101   Resp 12   Ht 1.702 m (5' 7\")   Wt 111 kg (245 lb)   SpO2 90%   BMI 38.37 " kg/m²     Physical Exam  Vitals and nursing note reviewed.   Constitutional:       General: He is not in acute distress.     Appearance: He is not diaphoretic.   HENT:      Head: Normocephalic and atraumatic.      Right Ear: External ear normal.      Left Ear: External ear normal.      Nose: No congestion or rhinorrhea.   Eyes:      General:         Right eye: No discharge.         Left eye: No discharge.   Neck:      Thyroid: No thyromegaly.      Vascular: No JVD.   Cardiovascular:      Rate and Rhythm: Normal rate. Rhythm irregular.      Pulses: Normal pulses.   Pulmonary:      Effort: No respiratory distress.   Abdominal:      General: There is no distension.      Tenderness: There is no abdominal tenderness.   Musculoskeletal:         General: No swelling or tenderness.      Right lower leg: No edema.      Left lower leg: No edema.   Skin:     General: Skin is warm and dry.   Neurological:      Mental Status: He is alert and oriented to person, place, and time.      Cranial Nerves: No cranial nerve deficit.   Psychiatric:         Behavior: Behavior normal.                Assessment & Plan     1. Persistent atrial fibrillation (HCC)  EKG    REFERRAL TO SLEEP STUDIES    CL-CARDIOVERSION    EC-JEREMIAS W/O CONT    apixaban (ELIQUIS) 5mg Tab    nebivolol (BYSTOLIC) 5 MG Tab tablet      2. Type 2 diabetes mellitus with hyperglycemia, without long-term current use of insulin (HCC)  Empagliflozin (JARDIANCE) 10 MG Tab tablet    HEMOGLOBIN AND HEMATOCRIT      3. HTN (hypertension), malignant  nebivolol (BYSTOLIC) 5 MG Tab tablet      4. Pure hypercholesterolemia  Basic Metabolic Panel    LIPID PANEL    Lipoprotein (a)      5. High risk medication use        6. Former smoker        7. Hypercoagulable state due to persistent atrial fibrillation (HCC)  apixaban (ELIQUIS) 5mg Tab          Medical Decision Making: Today's Assessment/Status/Plan:     Overall, patient's HMM2GQ4-FATb score is elevated with hypertension and type 2  diabetes.  I will start patient on anticoagulation with Eliquis 5 mg p.o. twice a day for stroke risk reduction.    Patient will benefit from sinus restoration as he is young.  We will plan for JEREMIAS and cardioversion. Will Will add Bystolic 5 mg daily.    Risks and benefits of transesophageal echocardiogram were explained to patient.  Patient showed good understanding.  Risks including esophageal perforation, death, bleeding, infection were clearly conveyed to patient.  Patient is willing to accept the risks and proceed with procedure.    I will refer patient to have sleep studies for obstructive sleep apnea.    Will add Jardiance 10 mg daily for DM2 treatment and cardiac benefits. Will discuss with PMD for further DM2 tx.    This visit encounter signifies the visit complexity inherent to evaluation and management associated with medical care services that serve as the continuing focal point for all needed health care services and/or with medical care services that are part of ongoing care related to this patient's single, serious condition, complex cardiac condition.    Stoney Cochran M.D.

## 2024-04-12 NOTE — PATIENT INSTRUCTIONS
Will start Eliquis 5 mg twice a day.    Will start Jardiance 10 mg daily.    Will add Bystolic 5 mg daily.

## 2024-04-15 ENCOUNTER — APPOINTMENT (OUTPATIENT)
Dept: ADMISSIONS | Facility: MEDICAL CENTER | Age: 59
End: 2024-04-15
Attending: INTERNAL MEDICINE
Payer: MEDICAID

## 2024-04-15 ENCOUNTER — TELEPHONE (OUTPATIENT)
Dept: CARDIOLOGY | Facility: MEDICAL CENTER | Age: 59
End: 2024-04-15
Payer: MEDICAID

## 2024-04-15 DIAGNOSIS — I48.19 PERSISTENT ATRIAL FIBRILLATION (HCC): ICD-10-CM

## 2024-04-15 NOTE — TELEPHONE ENCOUNTER
PA sent to plan    Taarianna Stokes (Frausto: [id number])  PA Case ID #: [id number]  Rx #: [id number]  Need Help? Call us at [phone]  Status  sent iconSent to Plan today  Drug  Nebivolol HCl 5MG tablets  ePA cloud logo  Form  [place] Medicaid Electronic PA Form (2017 NCPDP)  Original Claim Info  75

## 2024-04-15 NOTE — TELEPHONE ENCOUNTER
PA started    Taarianna Stokes (Frausto: [id number])  PA Case ID #: [id number]  Rx #: [id number]  Need Help? Call us at [phone]  Status  Additional Information Required  Drug  Nebivolol HCl 5MG tablets  ePA cloud logo  Form  [place] Electronic PA Form (2017 NCPDP)  Original Claim Info  75

## 2024-04-15 NOTE — TELEPHONE ENCOUNTER
----- Message from Molly Goldstein, Med Ass't sent at 4/15/2024  7:03 AM PDT -----    ----- Message -----  From: Stoney Cochran M.D.  Sent: 4/12/2024   5:08 PM PDT  To: Vangie Lo    Lets plan for JEREMIAS and cardioversion some time next week while I will be rounding in hospital.    Stoney Cochran M.D.

## 2024-04-15 NOTE — TELEPHONE ENCOUNTER
PERRI for pt to c/b to schedule JEREMIAS/Cv with To this week. Have a spot held on Friday 4-19-24 at 10:00.

## 2024-04-15 NOTE — TELEPHONE ENCOUNTER
Patient is scheduled on 4-17-24 for a JEREMIAS/CV w/anesthesia with Dr. Cochran. Patient to check in at 11:00 for a 1:00 procedure. H&P was done on 4-12-24 by Dr. Cochran. Pre admit to call patient. No meds to stop for procedure. Patient hasn't started Jardiance yet.No device

## 2024-04-16 ENCOUNTER — PRE-ADMISSION TESTING (OUTPATIENT)
Dept: ADMISSIONS | Facility: MEDICAL CENTER | Age: 59
End: 2024-04-16
Attending: INTERNAL MEDICINE
Payer: MEDICAID

## 2024-04-17 ENCOUNTER — APPOINTMENT (OUTPATIENT)
Dept: CARDIOLOGY | Facility: MEDICAL CENTER | Age: 59
End: 2024-04-17
Attending: INTERNAL MEDICINE
Payer: MEDICAID

## 2024-04-17 ENCOUNTER — HOSPITAL ENCOUNTER (OUTPATIENT)
Facility: MEDICAL CENTER | Age: 59
End: 2024-04-17
Attending: INTERNAL MEDICINE | Admitting: INTERNAL MEDICINE
Payer: MEDICAID

## 2024-04-17 VITALS
OXYGEN SATURATION: 92 % | HEIGHT: 67 IN | DIASTOLIC BLOOD PRESSURE: 72 MMHG | SYSTOLIC BLOOD PRESSURE: 108 MMHG | BODY MASS INDEX: 37.75 KG/M2 | WEIGHT: 240.52 LBS | RESPIRATION RATE: 16 BRPM | TEMPERATURE: 97.6 F | HEART RATE: 74 BPM

## 2024-04-17 DIAGNOSIS — I48.19 PERSISTENT ATRIAL FIBRILLATION (HCC): ICD-10-CM

## 2024-04-17 LAB — EKG IMPRESSION: NORMAL

## 2024-04-17 PROCEDURE — 93010 ELECTROCARDIOGRAM REPORT: CPT | Performed by: INTERNAL MEDICINE

## 2024-04-17 PROCEDURE — 93005 ELECTROCARDIOGRAM TRACING: CPT | Performed by: INTERNAL MEDICINE

## 2024-04-17 RX ORDER — SODIUM CHLORIDE, SODIUM LACTATE, POTASSIUM CHLORIDE, CALCIUM CHLORIDE 600; 310; 30; 20 MG/100ML; MG/100ML; MG/100ML; MG/100ML
INJECTION, SOLUTION INTRAVENOUS CONTINUOUS
Status: CANCELLED | OUTPATIENT
Start: 2024-04-17 | End: 2024-04-17

## 2024-04-17 ASSESSMENT — FIBROSIS 4 INDEX: FIB4 SCORE: 1.02

## 2024-04-17 NOTE — OR NURSING
Patient's EKG completed, revealed patient is in Sinus Rhythm. Cath lab notified as well as Dr Cochran. Per Dr. Cochran, patient cancelled for today, and is okay to discharge home.

## 2024-04-17 NOTE — TELEPHONE ENCOUNTER
TT- please advise if you would like to order a preferred beta blocker,    Metoprolol, Bisoprolol, Acebutolol, Betaxolol

## 2024-04-17 NOTE — TELEPHONE ENCOUNTER
PA denied due to lack of intolerance or inadequate response to two preferred beta blockers.    Preferred Medication  Metoprolol tartrate, Acebutolol, Bisoprolol, Betaxolol...    See denial for more preferred medication.    Ta Stokes (Frausto: [id number])  PA Case ID #: [id number]  Rx #: [id number]  Need Help? Call us at [phone]  Outcome  Denied on April 15  PA Case: [id number], Status: Denied. Notification: Completed.  Drug  Nebivolol HCl 5MG tablets  ePA cloud logo  Form  [employer] Electronic PA Form (2017 NCPDP)  Original Claim Info  75

## 2024-04-19 RX ORDER — BISOPROLOL FUMARATE 5 MG/1
5 TABLET, FILM COATED ORAL DAILY
Qty: 90 TABLET | Refills: 2 | Status: SHIPPED | OUTPATIENT
Start: 2024-04-19

## 2024-04-20 ENCOUNTER — HOSPITAL ENCOUNTER (OUTPATIENT)
Dept: LAB | Facility: MEDICAL CENTER | Age: 59
End: 2024-04-20
Attending: PHYSICIAN ASSISTANT
Payer: MEDICAID

## 2024-04-20 ENCOUNTER — HOSPITAL ENCOUNTER (OUTPATIENT)
Dept: LAB | Facility: MEDICAL CENTER | Age: 59
End: 2024-04-20
Attending: INTERNAL MEDICINE
Payer: MEDICAID

## 2024-04-20 DIAGNOSIS — E78.00 PURE HYPERCHOLESTEROLEMIA: ICD-10-CM

## 2024-04-20 DIAGNOSIS — E11.65 TYPE 2 DIABETES MELLITUS WITH HYPERGLYCEMIA, WITHOUT LONG-TERM CURRENT USE OF INSULIN (HCC): ICD-10-CM

## 2024-04-20 LAB
ALBUMIN SERPL BCP-MCNC: 4 G/DL (ref 3.2–4.9)
ALBUMIN/GLOB SERPL: 1.8 G/DL
ALP SERPL-CCNC: 76 U/L (ref 30–99)
ALT SERPL-CCNC: 33 U/L (ref 2–50)
ANION GAP SERPL CALC-SCNC: 10 MMOL/L (ref 7–16)
ANION GAP SERPL CALC-SCNC: 8 MMOL/L (ref 7–16)
AST SERPL-CCNC: 20 U/L (ref 12–45)
BILIRUB SERPL-MCNC: 0.2 MG/DL (ref 0.1–1.5)
BUN SERPL-MCNC: 19 MG/DL (ref 8–22)
BUN SERPL-MCNC: 19 MG/DL (ref 8–22)
CALCIUM ALBUM COR SERPL-MCNC: 9 MG/DL (ref 8.5–10.5)
CALCIUM SERPL-MCNC: 8.8 MG/DL (ref 8.5–10.5)
CALCIUM SERPL-MCNC: 9 MG/DL (ref 8.5–10.5)
CHLORIDE SERPL-SCNC: 107 MMOL/L (ref 96–112)
CHLORIDE SERPL-SCNC: 107 MMOL/L (ref 96–112)
CHOLEST SERPL-MCNC: 149 MG/DL (ref 100–199)
CO2 SERPL-SCNC: 22 MMOL/L (ref 20–33)
CO2 SERPL-SCNC: 25 MMOL/L (ref 20–33)
CREAT SERPL-MCNC: 0.67 MG/DL (ref 0.5–1.4)
CREAT SERPL-MCNC: 0.78 MG/DL (ref 0.5–1.4)
GFR SERPLBLD CREATININE-BSD FMLA CKD-EPI: 103 ML/MIN/1.73 M 2
GFR SERPLBLD CREATININE-BSD FMLA CKD-EPI: 108 ML/MIN/1.73 M 2
GLOBULIN SER CALC-MCNC: 2.2 G/DL (ref 1.9–3.5)
GLUCOSE SERPL-MCNC: 130 MG/DL (ref 65–99)
GLUCOSE SERPL-MCNC: 133 MG/DL (ref 65–99)
HCT VFR BLD AUTO: 48 % (ref 42–52)
HDLC SERPL-MCNC: 39 MG/DL
HGB BLD-MCNC: 16 G/DL (ref 14–18)
LDLC SERPL CALC-MCNC: 79 MG/DL
POTASSIUM SERPL-SCNC: 4.4 MMOL/L (ref 3.6–5.5)
POTASSIUM SERPL-SCNC: 5 MMOL/L (ref 3.6–5.5)
PROT SERPL-MCNC: 6.2 G/DL (ref 6–8.2)
PSA SERPL-MCNC: 0.85 NG/ML (ref 0–4)
SODIUM SERPL-SCNC: 139 MMOL/L (ref 135–145)
SODIUM SERPL-SCNC: 140 MMOL/L (ref 135–145)
TESTOST SERPL-MCNC: 11 NG/DL (ref 175–781)
TRIGL SERPL-MCNC: 155 MG/DL (ref 0–149)

## 2024-04-20 PROCEDURE — 80061 LIPID PANEL: CPT

## 2024-04-20 PROCEDURE — 36415 COLL VENOUS BLD VENIPUNCTURE: CPT

## 2024-04-20 PROCEDURE — 85018 HEMOGLOBIN: CPT

## 2024-04-20 PROCEDURE — 80053 COMPREHEN METABOLIC PANEL: CPT

## 2024-04-20 PROCEDURE — 85014 HEMATOCRIT: CPT

## 2024-04-20 PROCEDURE — 84403 ASSAY OF TOTAL TESTOSTERONE: CPT

## 2024-04-20 PROCEDURE — 83695 ASSAY OF LIPOPROTEIN(A): CPT

## 2024-04-20 PROCEDURE — 84153 ASSAY OF PSA TOTAL: CPT

## 2024-04-20 PROCEDURE — 80048 BASIC METABOLIC PNL TOTAL CA: CPT

## 2024-04-22 ENCOUNTER — OFFICE VISIT (OUTPATIENT)
Dept: MEDICAL GROUP | Facility: MEDICAL CENTER | Age: 59
End: 2024-04-22
Attending: FAMILY MEDICINE
Payer: MEDICAID

## 2024-04-22 VITALS
TEMPERATURE: 97.2 F | WEIGHT: 241 LBS | OXYGEN SATURATION: 92 % | HEART RATE: 100 BPM | RESPIRATION RATE: 16 BRPM | SYSTOLIC BLOOD PRESSURE: 118 MMHG | DIASTOLIC BLOOD PRESSURE: 62 MMHG | BODY MASS INDEX: 37.83 KG/M2 | HEIGHT: 67 IN

## 2024-04-22 DIAGNOSIS — E11.69 TYPE 2 DIABETES MELLITUS WITH OTHER SPECIFIED COMPLICATION, WITHOUT LONG-TERM CURRENT USE OF INSULIN (HCC): ICD-10-CM

## 2024-04-22 DIAGNOSIS — I48.91 ATRIAL FIBRILLATION, UNSPECIFIED TYPE (HCC): ICD-10-CM

## 2024-04-22 DIAGNOSIS — G47.34 NOCTURNAL HYPOXIA: ICD-10-CM

## 2024-04-22 PROBLEM — E11.9 DIABETES MELLITUS (HCC): Status: ACTIVE | Noted: 2024-04-22

## 2024-04-22 LAB — LPA SERPL-MCNC: <6 MG/DL

## 2024-04-22 PROCEDURE — 99214 OFFICE O/P EST MOD 30 MIN: CPT | Performed by: FAMILY MEDICINE

## 2024-04-22 PROCEDURE — 3074F SYST BP LT 130 MM HG: CPT | Performed by: FAMILY MEDICINE

## 2024-04-22 PROCEDURE — 3078F DIAST BP <80 MM HG: CPT | Performed by: FAMILY MEDICINE

## 2024-04-22 RX ORDER — EMPAGLIFLOZIN 10 MG/1
10 TABLET, FILM COATED ORAL DAILY
Qty: 30 TABLET | Refills: 3 | Status: SHIPPED | OUTPATIENT
Start: 2024-04-22

## 2024-04-22 ASSESSMENT — FIBROSIS 4 INDEX: FIB4 SCORE: 0.98

## 2024-04-22 NOTE — PROGRESS NOTES
Verbal consent was acquired by the patient to use [employer] ambient listening note generation during this visit.    Subjective   No chief complaint on file.     History of Present Illness  The patient presents for follow-up. He is accompanied by his wife Jacklyn    The patient consulted with his cardiologist, Dr. Cochran, on 4/ 12/2024 to discuss the Holter monitor results consistent with persistent a-fib . A procedure was scheduled for the following Friday, but procedure was deemed unnecessary and he was discharged. Subsequently, an ultrasound of his heart was performed with normal findings. The patient was prescribed three medications by Dr. Cochran, but due to insurance constraints, he was unable to obtain them until today. Currently, he is on a regimen of Eliquis 5 mg twice daily, bisoprolol 5 mg daily, and Jardiance. He expressed concerns about potential interactions between bisoprolol and lisinopril. He monitors his blood pressure at home using an arm cuff. The patient expressed curiosity about the necessity of continuing with the Holter monitor. He also reported experiencing shortness of breath during physical activity, but is otherwise without any complaints.    The patient was referred to a sleep specialist last month, but has yet to receive a response. He was informed that sleep apnea could potentially cause Afib.    Supplemental Information  He takes over-the-counter vitamin D and calcium supplements. He is on tamsulosin through Dr. Jhonson's office. He is also getting injections for treatment of prostate cancer. He is going for the next injection on 04/29/2023. His PSA has been low with doing the injections. He has to get the blood work done every month before he gets the injection.      Health Maintenance Due   Topic Date Due    Diabetes: Urine Protein Screening  Never done    Diabetes: Monofilament / LE Exam  Never done    Hepatitis B Vaccine (Hep B) (1 of 3 - 19+ 3-dose series) Never done    Zoster (Shingles)  "Vaccines (1 of 2) Never done    Lung Cancer Screening Shared Decision Making  Never done    Pneumococcal Vaccine: 0-64 Years (2 of 2 - PCV) 2018    Diabetes: Retinopathy Screening  2020    COVID-19 Vaccine (3 - 2023-24 season) 2023       Objective   Social History     Tobacco Use    Smoking status: Former     Current packs/day: 0.00     Average packs/day: 1 pack/day for 39.0 years (39.0 ttl pk-yrs)     Types: Cigarettes     Start date: 1979     Quit date: 2018     Years since quittin.3    Smokeless tobacco: Never    Tobacco comments:     1ppd   Vaping Use    Vaping Use: Never used   Substance Use Topics    Alcohol use: Not Currently     Comment: once in awhile, 1 beer    Drug use: No       Exam:  /62   Pulse 100   Temp 36.2 °C (97.2 °F)   Resp 16   Ht 1.702 m (5' 7\")   Wt 109 kg (241 lb)   SpO2 92%   BMI 37.75 kg/m²     Physical Exam  Constitutional: Alert, no distress  Skin: No rashes in visible areas  Eye: Conjunctiva clear, lids normal  Respiratory: Unlabored respiratory effort, no cough  CV: IRIR  MSK: Normal gait, moves all extremities  Psych: Alert and oriented x3, normal affect and mood    No Known Allergies    [facility] [address] [place] [id number]  Phone: [phone] Fax: [phone]    Current Outpatient Medications   Medication Sig Dispense Refill    Empagliflozin (JARDIANCE) 10 MG Tab tablet Take 1 Tablet by mouth every day. 30 Tablet 3    apixaban (ELIQUIS) 5mg Tab Take 1 Tablet by mouth 2 times a day. 60 Tablet 11    bisoprolol (ZEBETA) 5 MG Tab Take 1 Tablet by mouth every day. 90 Tablet 2    lisinopril (PRINIVIL) 20 MG Tab Take 1 tablet by mouth once daily 90 Tablet 0    tamsulosin (FLOMAX) 0.4 MG capsule Take 1 Capsule by mouth at bedtime.      cyclobenzaprine (FLEXERIL) 10 mg Tab Take 1 Tablet by mouth 3 times a day as needed for Muscle Spasms. 30 Tablet 1    syringe MISC 4 mL with degarelix " 80 MG SOLR 80 mg Inject 80 mg under the skin one time.      Calcium Carb-Cholecalciferol (CALCIUM 1000 + D) 1000-20 MG-MCG Tab Take  by mouth.      atorvastatin (LIPITOR) 10 MG Tab Take 1 Tablet by mouth every evening. 90 Tablet 3    Misc. Devices Misc Overnight oximetry, O2 concentrator to keep nocturnal O2 sats > 92% 1 Device 0     No current facility-administered medications for this visit.       Assessment & Plan    59 y.o. male with the following -   1. Atrial fibrillation, unspecified type (HCC)        2. Type 2 diabetes mellitus with other specified complication, without long-term current use of insulin (HCC)  Empagliflozin (JARDIANCE) 10 MG Tab tablet      3. Nocturnal hypoxia          Assessment & Plan  1. Atrial fibrillation.  - New diagnosis; in Afib on exam today. Advised daily maintenance with eliquis for stroke prevention.  Patient to start beta-blocker for rate control. Following the administration of BB, if the systolic blood pressure remains below 120 or 130, he should continue the medication on its own. If it remains above 103, he should also take lisinopril. The target blood pressure is less than 130/80. The patient is instructed to maintain a record of his blood pressure for the next one to two weeks. Lisinopril will be temporarily discontinued, but can be reintroduced if his blood pressure consistently exceeds 140. The patient is instructed to contact Dr. Cochran's office to inquire about the necessity of continuing with the Holter monitor. Should the patient experience any new or concerning symptoms, such as shortness of breath or sudden weakness, he is to contact us immediately.    2. Diabetes.  Newly diagnosed. Clinically stable. A prescription for Jardiance will be resent to his [facility] pharmacy. May need PA as condition is new for patient and has not yet started other pharmacological treatments. However, given cardiac history would be beneficial to have patient on medication.    3. Chronic  condition; in need of further workup given newly diagnosed Afib  The patient was informed that a referral to a sleep specialist is still valid and has also been ordered through Cardiology office. Patient to call to schedule    Follow-up  The patient is scheduled for a follow-up visit in 06/2024.      Return if symptoms worsen or fail to improve.    Please note that this dictation was created using voice recognition software. I have made every reasonable attempt to correct obvious errors, but I expect that there are errors of grammar and possibly content that I did not discover before finalizing the note.

## 2024-04-23 ENCOUNTER — TELEPHONE (OUTPATIENT)
Dept: CARDIOLOGY | Facility: MEDICAL CENTER | Age: 59
End: 2024-04-23
Payer: MEDICAID

## 2024-04-23 NOTE — TELEPHONE ENCOUNTER
Received [employer] PA request via [employer]  for JARDIANCE 10MG TABLET. (Quantity:30, Day Supply:30)     Insurance: MEDICAID  Member ID:  [id number]  BIN: [id number]  PCN: [id number]  Group: [id number]     Ran Test claim via [place] & medication Rejects stating prior authorization is required.

## 2024-04-23 NOTE — TELEPHONE ENCOUNTER
Prior Authorization for JARDIANCE 10MG TABLET (Quantity: 30, Days: 30) has been submitted via Cover My Meds: Key ([id number])    Insurance: MEDICAID    Will follow up in 24-48 business hours.

## 2024-04-24 ENCOUNTER — TELEPHONE (OUTPATIENT)
Dept: CARDIOLOGY | Facility: MEDICAL CENTER | Age: 59
End: 2024-04-24
Payer: MEDICAID

## 2024-04-25 NOTE — TELEPHONE ENCOUNTER
Both Jardiance and Eliquis?  
Called the pharmacy and the patient picked this medication up on 4/22/24  
Chrissy Vega  You3 minutes ago (2:44 PM)     SF  Just the Eliquis we are waiting for Jardiance to be approved I called the insurance it is still under determination. They asked about Metformin and the PCP that re prescribed it put for diabetes I'm guessing they will probably deny they Jardiance. I will reach out to the patient and screen him for FA     
Chrissy Vega  You4 minutes ago (3:27 PM)     SF  Patient will be in tomorrow to sign PAP paperwork     
The patient is ineligible for PAP because of the type of insurance he has and the PA was denied how would you like to proceed?  
09-Sep-2018 17:17

## 2024-04-25 NOTE — TELEPHONE ENCOUNTER
Prior Authorization for JARDIANCE 10MG TABLET  has been denied for a quantity of 30 , day supply 30    Prior authorization was denied per the following: NOT TRY AND FAIL METFORMIN    Prior Authorization denial reference number: [id number]  Insurance: MEDICAID      Next Steps:Proceed with appeal process. Generate proposed appeal for provider approval & signature.

## 2024-05-10 ENCOUNTER — TELEPHONE (OUTPATIENT)
Dept: CARDIOLOGY | Facility: MEDICAL CENTER | Age: 59
End: 2024-05-10
Payer: MEDICAID

## 2024-05-10 NOTE — TELEPHONE ENCOUNTER
[facility] EOS to TT's nurseFritz, on 5/10/2024  Monitor noted:  211[address] episodes of AF for a 42% AF [place],  with an avg rate of 94 BPM  1 pause, 2.1 seconds  3% PVC burden  2% PAC burden  4 patient events corresponding with sinus 80-98, PVC, PAC

## 2024-05-28 ENCOUNTER — HOSPITAL ENCOUNTER (OUTPATIENT)
Dept: LAB | Facility: MEDICAL CENTER | Age: 59
End: 2024-05-28
Attending: PHYSICIAN ASSISTANT
Payer: MEDICAID

## 2024-05-28 LAB
ALBUMIN SERPL BCP-MCNC: 3.9 G/DL (ref 3.2–4.9)
ALBUMIN/GLOB SERPL: 1.8 G/DL
ALP SERPL-CCNC: 72 U/L (ref 30–99)
ALT SERPL-CCNC: 24 U/L (ref 2–50)
ANION GAP SERPL CALC-SCNC: 11 MMOL/L (ref 7–16)
AST SERPL-CCNC: 16 U/L (ref 12–45)
BILIRUB SERPL-MCNC: 0.6 MG/DL (ref 0.1–1.5)
BUN SERPL-MCNC: 17 MG/DL (ref 8–22)
CALCIUM ALBUM COR SERPL-MCNC: 10 MG/DL (ref 8.5–10.5)
CALCIUM SERPL-MCNC: 9.9 MG/DL (ref 8.5–10.5)
CHLORIDE SERPL-SCNC: 108 MMOL/L (ref 96–112)
CO2 SERPL-SCNC: 22 MMOL/L (ref 20–33)
CREAT SERPL-MCNC: 0.82 MG/DL (ref 0.5–1.4)
GFR SERPLBLD CREATININE-BSD FMLA CKD-EPI: 102 ML/MIN/1.73 M 2
GLOBULIN SER CALC-MCNC: 2.2 G/DL (ref 1.9–3.5)
GLUCOSE SERPL-MCNC: 114 MG/DL (ref 65–99)
POTASSIUM SERPL-SCNC: 4.1 MMOL/L (ref 3.6–5.5)
PROT SERPL-MCNC: 6.1 G/DL (ref 6–8.2)
PSA SERPL-MCNC: 0.81 NG/ML (ref 0–4)
SODIUM SERPL-SCNC: 141 MMOL/L (ref 135–145)
TESTOST SERPL-MCNC: 11 NG/DL (ref 175–781)

## 2024-06-19 ENCOUNTER — TELEPHONE (OUTPATIENT)
Dept: MEDICAL GROUP | Facility: MEDICAL CENTER | Age: 59
End: 2024-06-19
Payer: MEDICAID

## 2024-06-26 ENCOUNTER — HOSPITAL ENCOUNTER (OUTPATIENT)
Dept: LAB | Facility: MEDICAL CENTER | Age: 59
End: 2024-06-26
Attending: PHYSICIAN ASSISTANT
Payer: MEDICAID

## 2024-06-26 ENCOUNTER — OFFICE VISIT (OUTPATIENT)
Dept: MEDICAL GROUP | Facility: MEDICAL CENTER | Age: 59
End: 2024-06-26
Attending: FAMILY MEDICINE
Payer: MEDICAID

## 2024-06-26 VITALS
HEART RATE: 86 BPM | SYSTOLIC BLOOD PRESSURE: 120 MMHG | OXYGEN SATURATION: 91 % | RESPIRATION RATE: 16 BRPM | DIASTOLIC BLOOD PRESSURE: 68 MMHG | HEIGHT: 67 IN | TEMPERATURE: 97.5 F | WEIGHT: 241.4 LBS | BODY MASS INDEX: 37.89 KG/M2

## 2024-06-26 DIAGNOSIS — L81.9 CHANGE IN COLOR OF PIGMENTED SKIN LESION: ICD-10-CM

## 2024-06-26 DIAGNOSIS — R51.9 NEW ONSET OF HEADACHE IN CANCER PATIENT: ICD-10-CM

## 2024-06-26 DIAGNOSIS — G47.34 NOCTURNAL HYPOXIA: ICD-10-CM

## 2024-06-26 DIAGNOSIS — I10 ESSENTIAL HYPERTENSION: ICD-10-CM

## 2024-06-26 DIAGNOSIS — E11.9 TYPE 2 DIABETES MELLITUS WITHOUT COMPLICATION, WITHOUT LONG-TERM CURRENT USE OF INSULIN (HCC): ICD-10-CM

## 2024-06-26 LAB
ALBUMIN SERPL BCP-MCNC: 3.8 G/DL (ref 3.2–4.9)
ALBUMIN/GLOB SERPL: 1.7 G/DL
ALP SERPL-CCNC: 86 U/L (ref 30–99)
ALT SERPL-CCNC: 25 U/L (ref 2–50)
ANION GAP SERPL CALC-SCNC: 10 MMOL/L (ref 7–16)
AST SERPL-CCNC: 18 U/L (ref 12–45)
BILIRUB SERPL-MCNC: 0.3 MG/DL (ref 0.1–1.5)
BUN SERPL-MCNC: 20 MG/DL (ref 8–22)
CALCIUM ALBUM COR SERPL-MCNC: 9.3 MG/DL (ref 8.5–10.5)
CALCIUM SERPL-MCNC: 9.1 MG/DL (ref 8.5–10.5)
CHLORIDE SERPL-SCNC: 108 MMOL/L (ref 96–112)
CO2 SERPL-SCNC: 25 MMOL/L (ref 20–33)
CREAT SERPL-MCNC: 0.89 MG/DL (ref 0.5–1.4)
GFR SERPLBLD CREATININE-BSD FMLA CKD-EPI: 99 ML/MIN/1.73 M 2
GLOBULIN SER CALC-MCNC: 2.3 G/DL (ref 1.9–3.5)
GLUCOSE SERPL-MCNC: 123 MG/DL (ref 65–99)
POTASSIUM SERPL-SCNC: 4.3 MMOL/L (ref 3.6–5.5)
PROT SERPL-MCNC: 6.1 G/DL (ref 6–8.2)
PSA SERPL-MCNC: 4.69 NG/ML (ref 0–4)
SODIUM SERPL-SCNC: 143 MMOL/L (ref 135–145)
TESTOST SERPL-MCNC: 5 NG/DL (ref 175–781)

## 2024-06-26 PROCEDURE — 3078F DIAST BP <80 MM HG: CPT | Performed by: FAMILY MEDICINE

## 2024-06-26 PROCEDURE — 99214 OFFICE O/P EST MOD 30 MIN: CPT | Performed by: FAMILY MEDICINE

## 2024-06-26 PROCEDURE — 99213 OFFICE O/P EST LOW 20 MIN: CPT | Performed by: FAMILY MEDICINE

## 2024-06-26 PROCEDURE — 80053 COMPREHEN METABOLIC PANEL: CPT

## 2024-06-26 PROCEDURE — 3074F SYST BP LT 130 MM HG: CPT | Performed by: FAMILY MEDICINE

## 2024-06-26 PROCEDURE — 36415 COLL VENOUS BLD VENIPUNCTURE: CPT

## 2024-06-26 PROCEDURE — 84403 ASSAY OF TOTAL TESTOSTERONE: CPT

## 2024-06-26 PROCEDURE — 84153 ASSAY OF PSA TOTAL: CPT

## 2024-06-26 RX ORDER — LISINOPRIL 5 MG/1
TABLET ORAL
Qty: 90 TABLET | Refills: 3 | Status: SHIPPED | OUTPATIENT
Start: 2024-06-26

## 2024-06-26 ASSESSMENT — FIBROSIS 4 INDEX: FIB4 SCORE: 1.01

## 2024-06-26 NOTE — PROGRESS NOTES
Verbal consent was acquired by the patient to use [employer] ambient listening note generation during this visit.    Subjective   Chief Complaint   Patient presents with    Follow-Up        HPI:   Ta presents today with    History of Present Illness  The patient presents for follow-up. He is accompanied by his wife Jacklyn.    The patient's last visit was in 04/2024, during which we discussed his atrial fibrillation, diabetes, and oxygen concerns. He has been under the care of a urologist for prostate cancer and is currently undergoing treatment. A sleep study was conducted, but the results are pending. A CT scan was recommended, which is scheduled with [facility]. His insurance has not yet approved Jardiance, and he has been without diabetes medications since our last consultation.    The patient has not been taking lisinopril since his last visit, but has been taking bisoprolol 5 mg.    The patient has developed keratoses on his scalp and back. One of the lesions has exhibited redness and irritation, prompting him to apply a Band-Aid. He expresses concern about these lesions due to his extensive sun exposure. He also reports a black spot on his left calf, which was treated by his previous PCP approximately 10 years ago that has never healed.    The patient reports experiencing intense headaches behind his right eye once or twice weekly, which typically last for a few minutes. These headaches have been ongoing for the past 1.5 months. He denies any associated vision loss. He does not take any medication for these headaches.    The patient reports experiencing shortness of breath, particularly when attempting to roll over in bed or rise from a sedative. He uses an O2 concentrator during sleep, which he believes may be failing. He occasionally wakes up feeling as though he can not breathe.      Health Maintenance Due   Topic Date Due    Diabetes: Urine Protein Screening  Never done    Diabetes:  "Monofilament / LE Exam  Never done    Hepatitis B Vaccine (Hep B) (1 of 3 - 19+ 3-dose series) Never done    Zoster (Shingles) Vaccines (1 of 2) Never done    Lung Cancer Screening Shared Decision Making  Never done    Pneumococcal Vaccine: 0-64 Years (2 of 2 - PCV) 2018    Diabetes: Retinopathy Screening  2020    COVID-19 Vaccine (3 - -24 season) 2023    A1c Screening  2024       Objective   Social History     Tobacco Use    Smoking status: Former     Current packs/day: 0.00     Average packs/day: 1 pack/day for 39.0 years (39.0 ttl pk-yrs)     Types: Cigarettes     Start date: 1979     Quit date: 2018     Years since quittin.4    Smokeless tobacco: Never    Tobacco comments:     1ppd   Vaping Use    Vaping status: Never Used   Substance Use Topics    Alcohol use: Not Currently     Comment: once in awhile, 1 beer    Drug use: No       Exam:  /68 (BP Location: Left arm, Patient Position: Sitting, BP Cuff Size: Large adult)   Pulse 86   Temp 36.4 °C (97.5 °F) (Temporal)   Resp 16   Ht 1.702 m (5' 7\")   Wt 109 kg (241 lb 6.4 oz)   SpO2 91%   BMI 37.81 kg/m²     Physical Exam  Constitutional: Alert, no distress  Skin:     Photo from 2023:    Photo from 2024      Eye: Conjunctiva clear, lids normal  Respiratory: Unlabored respiratory effort, no cough  CV: IRRR  MSK: Normal gait, moves all extremities  Psych: Alert and oriented x3, normal affect and mood    No Known Allergies    [facility] [address] [place], [place] [address] [address] [address]  Phone: [phone] Fax: [phone]    Current Outpatient Medications   Medication Sig Dispense Refill    lisinopril (PRINIVIL) 5 MG Tab Take 1 tablet by mouth once daily 90 Tablet 3    metformin (GLUCOPHAGE) 1000 MG tablet Take 1 Tablet by mouth 2 times a day with meals. 60 Tablet 3    bisoprolol (ZEBETA) 5 MG Tab Take 1 Tablet by mouth every day. 90 Tablet 2    apixaban (ELIQUIS) " 5mg Tab Take 1 Tablet by mouth 2 times a day. 60 Tablet 11    tamsulosin (FLOMAX) 0.4 MG capsule Take 1 Capsule by mouth at bedtime.      cyclobenzaprine (FLEXERIL) 10 mg Tab Take 1 Tablet by mouth 3 times a day as needed for Muscle Spasms. 30 Tablet 1    syringe MISC 4 mL with degarelix 80 MG SOLR 80 mg Inject 80 mg under the skin one time.      Calcium Carb-Cholecalciferol (CALCIUM 1000 + D) 1000-20 MG-MCG Tab Take  by mouth.      atorvastatin (LIPITOR) 10 MG Tab Take 1 Tablet by mouth every evening. 90 Tablet 3    Misc. Devices Misc Overnight oximetry, O2 concentrator to keep nocturnal O2 sats > 92% 1 Device 0    Empagliflozin (JARDIANCE) 10 MG Tab tablet Take 1 Tablet by mouth every day. (Patient not taking: Reported on 6/26/2024) 30 Tablet 3     No current facility-administered medications for this visit.       Assessment & Plan    59 y.o. male with the following -   1. Type 2 diabetes mellitus without complication, without long-term current use of insulin ([facility])  HEMOGLOBIN A1C    MICROALBUMIN CREAT RATIO URINE    metformin (GLUCOPHAGE) 1000 MG tablet      2. Nocturnal hypoxia        3. Essential hypertension  lisinopril (PRINIVIL) 5 MG Tab      4. Change in color of pigmented skin lesion        5. New onset of headache in cancer patient  CT-HEAD WITH & W/O        Assessment & Plan  1. Type 2 diabetes.  This is a newly diagnosed condition. The last A1c conducted in 12/2023 was recorded at 7.1 percent. A repeat A1c and microalbumin test are recommended, given the patient's lack of approval for Jardiance. The initiation of metformin is recommended, and the initiation of a lower dose of lisinopril for renal protection is also recommended. The statin should be continued for cardiovascular protection in the context of diabetes.    2. Chronic atrial fibrillation.  The patient's examination reveals that the patient remains in atrial fibrillation. The patient is advised to persist with the daily dose of bisoprolol 5 mg  and Eliquis, as prescribed by the cardiology office.    3. Hypertension.  The patient's blood pressure is within the target range today. The addition of low-dose lisinopril, primarily for renal protection in the context of diabetes, is recommended.    4. Change in color of pigmented skin lesion.  The patient was previously evaluated and diagnosed with suspected seborrheic keratosis. However, today's examination revealed an obvious variation in color. The patient also has a skin finding on his left calf that has resulted in poor wound healing. Further evaluation through a dermatologist for formal skin evaluation and additional diagnostic work-up is recommended.    5. New onset headaches.  Given the patient's age and cancer history, further evaluation with a CT scan of the head is recommended to help rule out a more serious etiology for the headaches.    6. Shortness of breath.  The patient's symptoms are likely secondary to untreated sleep apnea. Unfortunately, the patient missed his appointment with the pulmonologist yesterday and plans to reschedule for a close follow-up.    Return if symptoms worsen or fail to improve.    Please note that this dictation was created using voice recognition software. I have made every reasonable attempt to correct obvious errors, but I expect that there are errors of grammar and possibly content that I did not discover before finalizing the note.

## 2024-07-13 ENCOUNTER — HOSPITAL ENCOUNTER (OUTPATIENT)
Dept: LAB | Facility: MEDICAL CENTER | Age: 59
End: 2024-07-13
Attending: FAMILY MEDICINE
Payer: MEDICAID

## 2024-07-13 ENCOUNTER — HOSPITAL ENCOUNTER (OUTPATIENT)
Dept: LAB | Facility: MEDICAL CENTER | Age: 59
End: 2024-07-13
Attending: PHYSICIAN ASSISTANT
Payer: MEDICAID

## 2024-07-13 DIAGNOSIS — E11.9 TYPE 2 DIABETES MELLITUS WITHOUT COMPLICATION, WITHOUT LONG-TERM CURRENT USE OF INSULIN (HCC): ICD-10-CM

## 2024-07-13 LAB
CREAT UR-MCNC: 157.84 MG/DL
EST. AVERAGE GLUCOSE BLD GHB EST-MCNC: 146 MG/DL
HBA1C MFR BLD: 6.7 % (ref 4–5.6)
MICROALBUMIN UR-MCNC: <1.2 MG/DL
MICROALBUMIN/CREAT UR: NORMAL MG/G (ref 0–30)
PSA SERPL-MCNC: 0.94 NG/ML (ref 0–4)

## 2024-07-13 PROCEDURE — 82043 UR ALBUMIN QUANTITATIVE: CPT

## 2024-07-13 PROCEDURE — 83036 HEMOGLOBIN GLYCOSYLATED A1C: CPT

## 2024-07-13 PROCEDURE — 36415 COLL VENOUS BLD VENIPUNCTURE: CPT

## 2024-07-13 PROCEDURE — 84153 ASSAY OF PSA TOTAL: CPT

## 2024-07-13 PROCEDURE — 82570 ASSAY OF URINE CREATININE: CPT

## 2024-07-18 ENCOUNTER — HOSPITAL ENCOUNTER (OUTPATIENT)
Dept: RADIOLOGY | Facility: MEDICAL CENTER | Age: 59
End: 2024-07-18
Attending: FAMILY MEDICINE
Payer: MEDICAID

## 2024-07-18 PROCEDURE — 70470 CT HEAD/BRAIN W/O & W/DYE: CPT

## 2024-07-18 PROCEDURE — 700117 HCHG RX CONTRAST REV CODE 255: Mod: UD | Performed by: FAMILY MEDICINE

## 2024-07-18 RX ADMIN — IOHEXOL 50 ML: 350 INJECTION, SOLUTION INTRAVENOUS at 16:30

## 2024-08-08 ENCOUNTER — HOSPITAL ENCOUNTER (OUTPATIENT)
Dept: RADIOLOGY | Facility: MEDICAL CENTER | Age: 59
End: 2024-08-08
Attending: PHYSICIAN ASSISTANT
Payer: MEDICAID

## 2024-08-08 DIAGNOSIS — M85.89 OTHER SPECIFIED DISORDERS OF BONE DENSITY AND STRUCTURE, MULTIPLE SITES: ICD-10-CM

## 2024-08-08 PROCEDURE — 77080 DXA BONE DENSITY AXIAL: CPT

## 2024-08-10 ENCOUNTER — HOSPITAL ENCOUNTER (OUTPATIENT)
Dept: LAB | Facility: MEDICAL CENTER | Age: 59
End: 2024-08-10
Attending: INTERNAL MEDICINE
Payer: MEDICAID

## 2024-08-10 LAB
CHOLEST SERPL-MCNC: 153 MG/DL (ref 100–199)
FASTING STATUS PATIENT QL REPORTED: NORMAL
HDLC SERPL-MCNC: 50 MG/DL
LDLC SERPL CALC-MCNC: 73 MG/DL
TRIGL SERPL-MCNC: 152 MG/DL (ref 0–149)

## 2024-08-10 PROCEDURE — 80061 LIPID PANEL: CPT

## 2024-08-10 PROCEDURE — 36415 COLL VENOUS BLD VENIPUNCTURE: CPT

## 2024-08-12 ENCOUNTER — OFFICE VISIT (OUTPATIENT)
Dept: MEDICAL GROUP | Facility: MEDICAL CENTER | Age: 59
End: 2024-08-12
Attending: FAMILY MEDICINE
Payer: MEDICAID

## 2024-08-12 VITALS — RESPIRATION RATE: 16 BRPM | WEIGHT: 236 LBS | BODY MASS INDEX: 36.96 KG/M2

## 2024-08-12 DIAGNOSIS — Z23 NEED FOR VACCINATION: ICD-10-CM

## 2024-08-12 DIAGNOSIS — E11.41 DIABETIC MONONEUROPATHY ASSOCIATED WITH TYPE 2 DIABETES MELLITUS (HCC): ICD-10-CM

## 2024-08-12 DIAGNOSIS — E11.39 TYPE 2 DIABETES MELLITUS WITH OTHER OPHTHALMIC COMPLICATION, WITHOUT LONG-TERM CURRENT USE OF INSULIN (HCC): ICD-10-CM

## 2024-08-12 DIAGNOSIS — H53.19 VITREOUS FLASHES OF BOTH EYES: ICD-10-CM

## 2024-08-12 DIAGNOSIS — E66.9 OBESITY (BMI 30-39.9): ICD-10-CM

## 2024-08-12 PROCEDURE — 99214 OFFICE O/P EST MOD 30 MIN: CPT | Performed by: FAMILY MEDICINE

## 2024-08-12 PROCEDURE — 92250 FUNDUS PHOTOGRAPHY W/I&R: CPT | Performed by: FAMILY MEDICINE

## 2024-08-12 PROCEDURE — 99214 OFFICE O/P EST MOD 30 MIN: CPT | Mod: 25 | Performed by: FAMILY MEDICINE

## 2024-08-12 PROCEDURE — 90471 IMMUNIZATION ADMIN: CPT

## 2024-08-12 ASSESSMENT — FIBROSIS 4 INDEX: FIB4 SCORE: 1.01

## 2024-08-12 NOTE — PROGRESS NOTES
"Verbal consent was acquired by the patient to use [employer] ambient listening note generation during this visit.    Subjective   Chief Complaint   Patient presents with    Diabetes Mellitus        HPI:   Ta presents today with    History of Present Illness  The patient is here for follow-up. He is accompanied by his wife.    He initially took metformin twice daily for two days, but due to persistent diarrhea and gas, he discontinued its use after two days. He then resumed the medication once at night, which led to morning diarrhea but not severe gas. He has been on metformin since 6/2024    He has experienced three instances of vision lapses in the past six weeks, including twice in his left eye and once in his right eye. He describes his vision as a seeing a \"picture in picture\" . When he covers his left eye, he can still see the image in his upper left, but if he covers his other eye, the image is still in the upper left. This issue affects both eyes, always on the same side. He also experiences flashes of light when looking at his computer screen. A previous doctor suggested a possible retinal detachment. Despite wearing glasses, he finds them ineffective. He also reports a lack of depth perception.    He experiences a tingling sensation in his left hip when touched, which has been present for over a month. This sensation subsides when he does not touch it. He denies any rashes.    He continues his Firmagon injection treatment for prostate cancer.  Supplemental Information  Dr. Cochran ordered a lipid test the day before yesterday. He is going to  his CPAP machine on Friday. He has lost 5 pounds.    IMMUNIZATIONS  He received his initial pneumonia vaccine in 2017.        Health Maintenance Due   Topic Date Due    Diabetes: Monofilament / LE Exam  Never done    Hepatitis B Vaccine (Hep B) (1 of 3 - 19+ 3-dose series) Never done    Zoster (Shingles) Vaccines (1 of 2) Never done    Lung Cancer Screening Shared " Decision Making  Never done    Pneumococcal Vaccine: 0-64 Years (2 of 2 - PCV) 2018    Diabetes: Retinopathy Screening  2020    COVID-19 Vaccine (3 - 2023-24 season) 2023       Objective   Social History     Tobacco Use    Smoking status: Former     Current packs/day: 0.00     Average packs/day: 1 pack/day for 39.0 years (39.0 ttl pk-yrs)     Types: Cigarettes     Start date: 1979     Quit date: 2018     Years since quittin.6    Smokeless tobacco: Never    Tobacco comments:     1ppd   Vaping Use    Vaping status: Never Used   Substance Use Topics    Alcohol use: Not Currently     Comment: once in awhile, 1 beer    Drug use: No       Exam:  Resp 16   Wt 107 kg (236 lb)   BMI 36.96 kg/m²     Physical Exam  Feet:      Right foot:      Protective Sensation: 10 sites tested.  9 sites sensed.      Toenail Condition: Right toenails are abnormally thick.      Left foot:      Protective Sensation: 10 sites tested.  9 sites sensed.      Skin integrity: Skin integrity normal.      Comments: Decreased sensation on bilateral 5th toes      Constitutional: Alert, no distress  Skin: No rashes in visible areas  Eye: Conjunctiva clear, lids normal  Respiratory: Unlabored respiratory effort, no cough  MSK: Normal gait, moves all extremities  Psych: Alert and oriented x3, normal affect and mood    No Known Allergies    [facility] [address] [place] [id number]  Phone: [phone] Fax: [phone]    Current Outpatient Medications   Medication Sig Dispense Refill    lisinopril (PRINIVIL) 5 MG Tab Take 1 tablet by mouth once daily 90 Tablet 3    metformin (GLUCOPHAGE) 1000 MG tablet Take 1 Tablet by mouth 2 times a day with meals. 60 Tablet 3    Empagliflozin (JARDIANCE) 10 MG Tab tablet Take 1 Tablet by mouth every day. (Patient not taking: Reported on 2024) 30 Tablet 3    bisoprolol (ZEBETA) 5 MG Tab Take 1 Tablet by mouth every day. 90  Tablet 2    apixaban (ELIQUIS) 5mg Tab Take 1 Tablet by mouth 2 times a day. 60 Tablet 11    tamsulosin (FLOMAX) 0.4 MG capsule Take 1 Capsule by mouth at bedtime.      cyclobenzaprine (FLEXERIL) 10 mg Tab Take 1 Tablet by mouth 3 times a day as needed for Muscle Spasms. 30 Tablet 1    syringe MISC 4 mL with degarelix 80 MG SOLR 80 mg Inject 80 mg under the skin one time.      Calcium Carb-Cholecalciferol (CALCIUM 1000 + D) 1000-20 MG-MCG Tab Take  by mouth.      atorvastatin (LIPITOR) 10 MG Tab Take 1 Tablet by mouth every evening. 90 Tablet 3    Misc. Devices Misc Overnight oximetry, O2 concentrator to keep nocturnal O2 sats > 92% 1 Device 0     No current facility-administered medications for this visit.       Assessment & Plan    59 y.o. male with the following -     1. Type 2 diabetes mellitus with other ophthalmic complication, without long-term current use of insulin (HCC)  Referral to Ophthalmology    Diabetic Monofilament LE Exam    Pneumococcal Conjugate Vaccine 20-Valent (6 wks+)    POCT Retinal Eye Exam    Referral to Podiatry      2. Diabetic mononeuropathy associated with type 2 diabetes mellitus (HCC)  Referral to Podiatry      3. Vitreous flashes of both eyes  Referral to Ophthalmology      4. Obesity (BMI 30-39.9)  Patient identified as having weight management issue.  Appropriate orders and counseling given.      5. Need for vaccination  Pneumococcal Conjugate Vaccine 20-Valent (6 wks+)        Assessment & Plan  1. Type 2 diabetes.  Recently diagnosed.  Microalbumin test conducted last month showed normal results. His A1c has decreased from 7.1 to 6.7%. His cholesterol levels are within the desired range. Recommend he continue stain. Annual eye, kidney, and foot health checks for signs of diabetes were advised.  He will continue metformin 1000 mg at night. A follow-up A1c will be scheduled in 2 months. If side effects persist, Jardiance may be considered. He is advised to avoid walking barefoot and  wear sandals or slippers at home. Information on diabetes and cholesterol through dietary changes and nutrition labels will be provided. A pneumonia vaccine will be administered today. A referral to a podiatrist will be made for routine nail care.    2. Diabetic neuropathy.  New diagnosis. He is advised to avoid walking barefoot and wear sandals or slippers at home. Regular foot checks are recommended, ensuring skin is well-hydrated and clean. Vaseline can be applied after bathing.The pins and needles sensation he is experiencing in his hip is likely related to diabetes. He is advised to monitor his symptoms. If discomfort intensifies despite not touching, or radiates down his legs, he should inform me. A referral to a podiatrist will be made for additional evaluation and management.     3.Vitreous flashes in both eyes.  Acute on chronic issue. In 2018, he was referred to ophthalmology for evaluation of a retinal detachment however was never seen. A new referral to an ophthalmologist will be made.        Return in about 3 months (around 11/12/2024) for chronic condition follow up, diabetes follow up.    Please note that this dictation was created using voice recognition software. I have made every reasonable attempt to correct obvious errors, but I expect that there are errors of grammar and possibly content that I did not discover before finalizing the note.

## 2024-08-16 LAB — RETINAL SCREEN: NEGATIVE

## 2024-09-05 DIAGNOSIS — E78.5 DYSLIPIDEMIA: ICD-10-CM

## 2024-09-05 RX ORDER — ATORVASTATIN CALCIUM 10 MG/1
10 TABLET, FILM COATED ORAL NIGHTLY
Qty: 90 TABLET | Refills: 3 | Status: SHIPPED | OUTPATIENT
Start: 2024-09-05

## 2024-09-05 NOTE — TELEPHONE ENCOUNTER
Received request via: Patient    Was the patient seen in the last year in this department? Yes    Does the patient have an active prescription (recently filled or refills available) for medication(s) requested? No    Pharmacy Name: [facility]    Does the patient have [facility] and need 100-day supply? (This applies to ALL medications) Patient does not have SCP    Future Appointments         Provider Department Center    10/28/2024 3:30 PM Stoney Cochran M.D. [facility] - Operated by [facility]  Arrive at: Cardiology [facility] - Arrival

## 2024-10-03 DIAGNOSIS — E11.9 TYPE 2 DIABETES MELLITUS WITHOUT COMPLICATION, WITHOUT LONG-TERM CURRENT USE OF INSULIN (HCC): ICD-10-CM

## 2024-10-04 ENCOUNTER — TELEPHONE (OUTPATIENT)
Dept: MEDICAL GROUP | Facility: MEDICAL CENTER | Age: 59
End: 2024-10-04

## 2024-10-04 ENCOUNTER — HOSPITAL ENCOUNTER (OUTPATIENT)
Dept: LAB | Facility: MEDICAL CENTER | Age: 59
End: 2024-10-04
Attending: UROLOGY
Payer: MEDICAID

## 2024-10-04 LAB — PSA SERPL-MCNC: 1.26 NG/ML (ref 0–4)

## 2024-10-04 PROCEDURE — 36415 COLL VENOUS BLD VENIPUNCTURE: CPT

## 2024-10-04 PROCEDURE — 84153 ASSAY OF PSA TOTAL: CPT

## 2024-10-04 NOTE — TELEPHONE ENCOUNTER
Patient came to office requesting prescription of metformin (GLUCOPHAGE) 1000 MG tablet to be written as a 90 day supply as his medicaid plan changed and the new plan wont cover it if its written as a 30 day supply. Patient did purchase his recent refill as he was out but would like an updated prescription for future refills. He would also like a complete panel of labs ordered. Please call patient to follow up on this.

## 2024-10-09 ENCOUNTER — HOSPITAL ENCOUNTER (OUTPATIENT)
Dept: LAB | Facility: MEDICAL CENTER | Age: 59
End: 2024-10-09
Attending: PHYSICIAN ASSISTANT
Payer: MEDICAID

## 2024-10-09 LAB
ALBUMIN SERPL BCP-MCNC: 4 G/DL (ref 3.2–4.9)
ALBUMIN/GLOB SERPL: 1.9 G/DL
ALP SERPL-CCNC: 61 U/L (ref 30–99)
ALT SERPL-CCNC: 23 U/L (ref 2–50)
ANION GAP SERPL CALC-SCNC: 13 MMOL/L (ref 7–16)
AST SERPL-CCNC: 11 U/L (ref 12–45)
BILIRUB SERPL-MCNC: 0.6 MG/DL (ref 0.1–1.5)
BUN SERPL-MCNC: 19 MG/DL (ref 8–22)
CALCIUM ALBUM COR SERPL-MCNC: 9.6 MG/DL (ref 8.5–10.5)
CALCIUM SERPL-MCNC: 9.6 MG/DL (ref 8.5–10.5)
CHLORIDE SERPL-SCNC: 102 MMOL/L (ref 96–112)
CO2 SERPL-SCNC: 20 MMOL/L (ref 20–33)
CREAT SERPL-MCNC: 0.8 MG/DL (ref 0.5–1.4)
GFR SERPLBLD CREATININE-BSD FMLA CKD-EPI: 102 ML/MIN/1.73 M 2
GLOBULIN SER CALC-MCNC: 2.1 G/DL (ref 1.9–3.5)
GLUCOSE SERPL-MCNC: 119 MG/DL (ref 65–99)
POTASSIUM SERPL-SCNC: 4.5 MMOL/L (ref 3.6–5.5)
PROT SERPL-MCNC: 6.1 G/DL (ref 6–8.2)
PSA SERPL-MCNC: 1.12 NG/ML (ref 0–4)
SODIUM SERPL-SCNC: 135 MMOL/L (ref 135–145)
TESTOST SERPL-MCNC: 9 NG/DL (ref 175–781)

## 2024-10-09 PROCEDURE — 84153 ASSAY OF PSA TOTAL: CPT

## 2024-10-09 PROCEDURE — 84403 ASSAY OF TOTAL TESTOSTERONE: CPT

## 2024-10-09 PROCEDURE — 80053 COMPREHEN METABOLIC PANEL: CPT

## 2024-10-09 PROCEDURE — 36415 COLL VENOUS BLD VENIPUNCTURE: CPT

## 2024-10-10 ENCOUNTER — OFFICE VISIT (OUTPATIENT)
Dept: MEDICAL GROUP | Facility: MEDICAL CENTER | Age: 59
End: 2024-10-10
Attending: FAMILY MEDICINE
Payer: MEDICAID

## 2024-10-10 VITALS
TEMPERATURE: 98.1 F | RESPIRATION RATE: 16 BRPM | BODY MASS INDEX: 37.51 KG/M2 | DIASTOLIC BLOOD PRESSURE: 78 MMHG | HEIGHT: 67 IN | SYSTOLIC BLOOD PRESSURE: 116 MMHG | OXYGEN SATURATION: 97 % | HEART RATE: 84 BPM | WEIGHT: 239 LBS

## 2024-10-10 DIAGNOSIS — Z23 NEED FOR VACCINATION: ICD-10-CM

## 2024-10-10 DIAGNOSIS — C61 CARCINOMA OF PROSTATE (HCC): ICD-10-CM

## 2024-10-10 DIAGNOSIS — Z51.0 ENCOUNTER FOR ANTINEOPLASTIC RADIATION THERAPY: ICD-10-CM

## 2024-10-10 DIAGNOSIS — R29.898 WEAKNESS OF BOTH HANDS: ICD-10-CM

## 2024-10-10 DIAGNOSIS — H93.13 TINNITUS AURIUM, BILATERAL: ICD-10-CM

## 2024-10-10 DIAGNOSIS — E11.39 TYPE 2 DIABETES MELLITUS WITH OTHER OPHTHALMIC COMPLICATION, WITHOUT LONG-TERM CURRENT USE OF INSULIN (HCC): ICD-10-CM

## 2024-10-10 PROCEDURE — 3074F SYST BP LT 130 MM HG: CPT | Performed by: FAMILY MEDICINE

## 2024-10-10 PROCEDURE — 90471 IMMUNIZATION ADMIN: CPT

## 2024-10-10 PROCEDURE — 3078F DIAST BP <80 MM HG: CPT | Performed by: FAMILY MEDICINE

## 2024-10-10 PROCEDURE — 99214 OFFICE O/P EST MOD 30 MIN: CPT | Performed by: FAMILY MEDICINE

## 2024-10-10 PROCEDURE — 99214 OFFICE O/P EST MOD 30 MIN: CPT | Mod: 25 | Performed by: FAMILY MEDICINE

## 2024-10-10 RX ORDER — EMPAGLIFLOZIN 10 MG/1
10 TABLET, FILM COATED ORAL DAILY
Qty: 90 TABLET | Refills: 3 | Status: SHIPPED | OUTPATIENT
Start: 2024-10-10

## 2024-10-10 ASSESSMENT — FIBROSIS 4 INDEX: FIB4 SCORE: 0.64

## 2024-10-15 ENCOUNTER — TELEPHONE (OUTPATIENT)
Dept: CARDIOLOGY | Facility: MEDICAL CENTER | Age: 59
End: 2024-10-15
Payer: MEDICAID

## 2024-10-16 ENCOUNTER — OFFICE VISIT (OUTPATIENT)
Dept: PHYSICAL MEDICINE AND REHAB | Facility: MEDICAL CENTER | Age: 59
End: 2024-10-16
Payer: MEDICAID

## 2024-10-16 VITALS
BODY MASS INDEX: 37.04 KG/M2 | RESPIRATION RATE: 16 BRPM | HEIGHT: 67 IN | WEIGHT: 236 LBS | TEMPERATURE: 96.8 F | HEART RATE: 87 BPM | OXYGEN SATURATION: 90 % | DIASTOLIC BLOOD PRESSURE: 34 MMHG | SYSTOLIC BLOOD PRESSURE: 115 MMHG

## 2024-10-16 DIAGNOSIS — R29.898 WEAKNESS OF BOTH HANDS: ICD-10-CM

## 2024-10-16 DIAGNOSIS — R20.0 BILATERAL HAND NUMBNESS: ICD-10-CM

## 2024-10-16 PROCEDURE — 3074F SYST BP LT 130 MM HG: CPT | Performed by: STUDENT IN AN ORGANIZED HEALTH CARE EDUCATION/TRAINING PROGRAM

## 2024-10-16 PROCEDURE — 3078F DIAST BP <80 MM HG: CPT | Performed by: STUDENT IN AN ORGANIZED HEALTH CARE EDUCATION/TRAINING PROGRAM

## 2024-10-16 PROCEDURE — 99204 OFFICE O/P NEW MOD 45 MIN: CPT | Performed by: STUDENT IN AN ORGANIZED HEALTH CARE EDUCATION/TRAINING PROGRAM

## 2024-10-16 ASSESSMENT — PAIN SCALES - GENERAL: PAINLEVEL: 6=MODERATE PAIN

## 2024-10-16 ASSESSMENT — FIBROSIS 4 INDEX: FIB4 SCORE: 0.64

## 2024-10-16 ASSESSMENT — PATIENT HEALTH QUESTIONNAIRE - PHQ9
CLINICAL INTERPRETATION OF PHQ2 SCORE: 4
SUM OF ALL RESPONSES TO PHQ QUESTIONS 1-9: 12
5. POOR APPETITE OR OVEREATING: 2 - MORE THAN HALF THE DAYS

## 2024-10-18 ENCOUNTER — TELEPHONE (OUTPATIENT)
Dept: CARDIOLOGY | Facility: MEDICAL CENTER | Age: 59
End: 2024-10-18
Payer: MEDICAID

## 2024-10-23 ENCOUNTER — OFFICE VISIT (OUTPATIENT)
Dept: PHYSICAL MEDICINE AND REHAB | Facility: MEDICAL CENTER | Age: 59
End: 2024-10-23
Payer: MEDICAID

## 2024-10-23 DIAGNOSIS — Z59.71 INSURANCE COVERAGE PROBLEMS: ICD-10-CM

## 2024-10-23 PROCEDURE — 99999 PR NO CHARGE: CPT | Performed by: STUDENT IN AN ORGANIZED HEALTH CARE EDUCATION/TRAINING PROGRAM

## 2024-12-11 ENCOUNTER — APPOINTMENT (OUTPATIENT)
Dept: LAB | Facility: MEDICAL CENTER | Age: 59
End: 2024-12-11
Payer: MEDICAID

## 2024-12-11 ENCOUNTER — HOSPITAL ENCOUNTER (OUTPATIENT)
Dept: LAB | Facility: MEDICAL CENTER | Age: 59
End: 2024-12-11
Attending: UROLOGY
Payer: MEDICAID

## 2024-12-11 LAB
ALBUMIN SERPL BCP-MCNC: 4 G/DL (ref 3.2–4.9)
ALBUMIN/GLOB SERPL: 1.7 G/DL
ALP SERPL-CCNC: 76 U/L (ref 30–99)
ALT SERPL-CCNC: 27 U/L (ref 2–50)
ANION GAP SERPL CALC-SCNC: 11 MMOL/L (ref 7–16)
AST SERPL-CCNC: 18 U/L (ref 12–45)
BILIRUB SERPL-MCNC: 0.4 MG/DL (ref 0.1–1.5)
BUN SERPL-MCNC: 19 MG/DL (ref 8–22)
CALCIUM ALBUM COR SERPL-MCNC: 9.2 MG/DL (ref 8.5–10.5)
CALCIUM SERPL-MCNC: 9.2 MG/DL (ref 8.5–10.5)
CHLORIDE SERPL-SCNC: 107 MMOL/L (ref 96–112)
CO2 SERPL-SCNC: 23 MMOL/L (ref 20–33)
CREAT SERPL-MCNC: 0.82 MG/DL (ref 0.5–1.4)
GFR SERPLBLD CREATININE-BSD FMLA CKD-EPI: 101 ML/MIN/1.73 M 2
GLOBULIN SER CALC-MCNC: 2.3 G/DL (ref 1.9–3.5)
GLUCOSE SERPL-MCNC: 110 MG/DL (ref 65–99)
POTASSIUM SERPL-SCNC: 4.4 MMOL/L (ref 3.6–5.5)
PROT SERPL-MCNC: 6.3 G/DL (ref 6–8.2)
PSA SERPL DL<=0.01 NG/ML-MCNC: 0.38 NG/ML (ref 0–4)
SODIUM SERPL-SCNC: 141 MMOL/L (ref 135–145)
TESTOST SERPL-MCNC: 3 NG/DL (ref 175–781)

## 2024-12-11 PROCEDURE — 84153 ASSAY OF PSA TOTAL: CPT

## 2024-12-11 PROCEDURE — 36415 COLL VENOUS BLD VENIPUNCTURE: CPT

## 2024-12-11 PROCEDURE — 84403 ASSAY OF TOTAL TESTOSTERONE: CPT

## 2024-12-11 PROCEDURE — 80053 COMPREHEN METABOLIC PANEL: CPT

## 2024-12-12 ENCOUNTER — OFFICE VISIT (OUTPATIENT)
Dept: CARDIOLOGY | Facility: MEDICAL CENTER | Age: 59
End: 2024-12-12
Attending: INTERNAL MEDICINE
Payer: MEDICAID

## 2024-12-12 VITALS
OXYGEN SATURATION: 94 % | BODY MASS INDEX: 37.04 KG/M2 | HEIGHT: 67 IN | SYSTOLIC BLOOD PRESSURE: 118 MMHG | HEART RATE: 101 BPM | WEIGHT: 236 LBS | RESPIRATION RATE: 16 BRPM | DIASTOLIC BLOOD PRESSURE: 70 MMHG

## 2024-12-12 DIAGNOSIS — E78.5 DYSLIPIDEMIA: Chronic | ICD-10-CM

## 2024-12-12 DIAGNOSIS — I48.19 PERSISTENT ATRIAL FIBRILLATION (HCC): ICD-10-CM

## 2024-12-12 DIAGNOSIS — D68.69 HYPERCOAGULABLE STATE DUE TO PERSISTENT ATRIAL FIBRILLATION (HCC): ICD-10-CM

## 2024-12-12 DIAGNOSIS — I10 ESSENTIAL HYPERTENSION: ICD-10-CM

## 2024-12-12 DIAGNOSIS — I48.19 HYPERCOAGULABLE STATE DUE TO PERSISTENT ATRIAL FIBRILLATION (HCC): ICD-10-CM

## 2024-12-12 PROCEDURE — 99214 OFFICE O/P EST MOD 30 MIN: CPT | Performed by: INTERNAL MEDICINE

## 2024-12-12 PROCEDURE — 3074F SYST BP LT 130 MM HG: CPT | Performed by: INTERNAL MEDICINE

## 2024-12-12 PROCEDURE — 3078F DIAST BP <80 MM HG: CPT | Performed by: INTERNAL MEDICINE

## 2024-12-12 PROCEDURE — 99213 OFFICE O/P EST LOW 20 MIN: CPT | Performed by: INTERNAL MEDICINE

## 2024-12-12 ASSESSMENT — FIBROSIS 4 INDEX: FIB4 SCORE: 0.97

## 2024-12-13 NOTE — PROGRESS NOTES
Chief Complaint   Patient presents with    Hypertension     F/V Dx: Essential Hypertension    Hyperlipidemia     F/V Dx: Hyperlipidemia    Atrial Fibrillation     F/V Dx: Atrial Fibrillation       Subjective     Ta Chris Stokes is a 59 y.o. male who presents today with paf self reverted      Doing well    Past Medical History:   Diagnosis Date    Arrhythmia     A-fib    Cancer (HCC) 2024    prostate cancer    Dental disorder     upper/lower dentures    Dyslipidemia     GERD (gastroesophageal reflux disease)     Heart burn     High cholesterol     Hypertension     Indigestion     Snoring      No past surgical history on file.  Family History   Problem Relation Age of Onset    Hypertension Mother     Cancer Maternal Grandmother      Social History     Socioeconomic History    Marital status: Single     Spouse name: Not on file    Number of children: Not on file    Years of education: Not on file    Highest education level: Associate degree: occupational, technical, or vocational program   Occupational History    Not on file   Tobacco Use    Smoking status: Former     Current packs/day: 0.00     Average packs/day: 1 pack/day for 39.0 years (39.0 ttl pk-yrs)     Types: Cigarettes     Start date: 1979     Quit date: 2018     Years since quittin.9    Smokeless tobacco: Never    Tobacco comments:     1ppd   Vaping Use    Vaping status: Never Used   Substance and Sexual Activity    Alcohol use: Not Currently     Comment: once in awhile, 1 beer    Drug use: No    Sexual activity: Not Currently     Partners: Female   Other Topics Concern    Not on file   Social History Narrative    Not on file     Social Drivers of Health     Financial Resource Strain: High Risk (3/22/2023)    Overall Financial Resource Strain (CARDIA)     Difficulty of Paying Living Expenses: Hard   Food Insecurity: Food Insecurity Present (3/22/2023)    Hunger Vital Sign     Worried About Running Out of Food in the Last Year: Sometimes true      Ran Out of Food in the Last Year: Never true   Transportation Needs: No Transportation Needs (3/22/2023)    PRAPARE - Transportation     Lack of Transportation (Medical): No     Lack of Transportation (Non-Medical): No   Physical Activity: Sufficiently Active (3/22/2023)    Exercise Vital Sign     Days of Exercise per Week: 7 days     Minutes of Exercise per Session: 150+ min   Stress: Stress Concern Present (3/22/2023)    [employer] [place] [employer] - Occupational Stress Questionnaire     Feeling of Stress : To some extent   Social Connections: Moderately Isolated (3/22/2023)    Social Connection and Isolation Panel [NHANES]     Frequency of Communication with Friends and Family: More than three times a week     Frequency of Social Gatherings with Friends and Family: More than three times a week     Attends [employer] Services: Never     Active Member of Clubs or Organizations: No     Attends Club or Organization Meetings: Never     Marital Status: Living with partner   Intimate Partner Violence: Not on file   Housing Stability: Low Risk  (3/22/2023)    Housing Stability Vital Sign     Unable to Pay for Housing in the Last Year: No     Number of Places Lived in the Last Year: 1     Unstable Housing in the Last Year: No     Allergies   Allergen Reactions    Metformin Diarrhea     diarrhea     Outpatient Encounter Medications as of 12/12/2024   Medication Sig Dispense Refill    Degarelix Acetate (FIRMAGON SC)       Empagliflozin (JARDIANCE) 10 MG Tab tablet Take 1 Tablet by mouth every day. 90 Tablet 3    atorvastatin (LIPITOR) 10 MG Tab Take 1 Tablet by mouth every evening. 90 Tablet 3    lisinopril (PRINIVIL) 5 MG Tab Take 1 tablet by mouth once daily 90 Tablet 3    bisoprolol (ZEBETA) 5 MG Tab Take 1 Tablet by mouth every day. 90 Tablet 2    apixaban (ELIQUIS) 5mg Tab Take 1 Tablet by mouth 2 times a day. 60 Tablet 11    tamsulosin (FLOMAX) 0.4 MG capsule Take 1 Capsule by mouth at bedtime.       "syringe MISC 4 mL with degarelix 80 MG SOLR 80 mg Inject 80 mg under the skin one time.      Calcium Carb-Cholecalciferol (CALCIUM 1000 + D) 1000-20 MG-MCG Tab Take  by mouth.      leuprolide (LUPRON) 22.5 MG Kit Inject 22.5 mg into the shoulder, thigh, or buttocks. (Patient not taking: Reported on 12/12/2024)      [DISCONTINUED] metformin (GLUCOPHAGE) 1000 MG tablet Take 1 Tablet by mouth 2 times a day with meals. 60 Tablet 3    cyclobenzaprine (FLEXERIL) 10 mg Tab Take 1 Tablet by mouth 3 times a day as needed for Muscle Spasms. (Patient not taking: Reported on 12/12/2024) 30 Tablet 1    Misc. Devices Misc Overnight oximetry, O2 concentrator to keep nocturnal O2 sats > 92% (Patient not taking: Reported on 12/12/2024) 1 Device 0     No facility-administered encounter medications on file as of 12/12/2024.     ROS           Objective     /70 (BP Location: Left arm, Patient Position: Sitting, BP Cuff Size: Adult)   Pulse (!) 101   Resp 16   Ht 1.702 m (5' 7\")   Wt 107 kg (236 lb)   SpO2 94%   BMI 36.96 kg/m²     Physical Exam  Constitutional:       General: He is not in acute distress.     Appearance: He is not diaphoretic.   Eyes:      General: No scleral icterus.  Neck:      Vascular: No JVD.   Cardiovascular:      Rate and Rhythm: Normal rate.      Heart sounds: Normal heart sounds. No murmur heard.     No friction rub. No gallop.   Pulmonary:      Effort: No respiratory distress.      Breath sounds: No wheezing or rales.   Abdominal:      General: Bowel sounds are normal.      Palpations: Abdomen is soft.   Musculoskeletal:      Right lower leg: No edema.      Left lower leg: No edema.   Skin:     Findings: No rash.   Neurological:      Mental Status: He is alert. Mental status is at baseline.   Psychiatric:         Mood and Affect: Mood normal.          We reviewed in person the most recent labs  Recent Results (from the past 30 weeks)   Comp Metabolic Panel    Collection Time: 05/28/24  2:58 PM "   Result Value Ref Range    Sodium 141 135 - 145 mmol/L    Potassium 4.1 3.6 - 5.5 mmol/L    Chloride 108 96 - 112 mmol/L    Co2 22 20 - 33 mmol/L    Anion Gap 11.0 7.0 - 16.0    Glucose 114 (H) 65 - 99 mg/dL    Bun 17 8 - 22 mg/dL    Creatinine 0.82 0.50 - 1.40 mg/dL    Calcium 9.9 8.5 - 10.5 mg/dL    Correct Calcium 10.0 8.5 - 10.5 mg/dL    AST(SGOT) 16 12 - 45 U/L    ALT(SGPT) 24 2 - 50 U/L    Alkaline Phosphatase 72 30 - 99 U/L    Total Bilirubin 0.6 0.1 - 1.5 mg/dL    Albumin 3.9 3.2 - 4.9 g/dL    Total Protein 6.1 6.0 - 8.2 g/dL    Globulin 2.2 1.9 - 3.5 g/dL    A-G Ratio 1.8 g/dL   PROSTATE SPECIFIC AG SCREENING    Collection Time: 05/28/24  2:58 PM   Result Value Ref Range    Prostatic Specific Antigen Tot 0.81 0.00 - 4.00 ng/mL   TESTOSTERONE SERUM    Collection Time: 05/28/24  2:58 PM   Result Value Ref Range    Testosterone,Total 11 (L) 175 - 781 ng/dL   ESTIMATED GFR    Collection Time: 05/28/24  2:58 PM   Result Value Ref Range    GFR (CKD-EPI) 102 >60 mL/min/1.73 m 2   PROSTATE SPECIFIC AG SCREENING    Collection Time: 06/26/24  7:07 AM   Result Value Ref Range    Prostatic Specific Antigen Tot 4.69 (H) 0.00 - 4.00 ng/mL   Comp Metabolic Panel    Collection Time: 06/26/24  7:07 AM   Result Value Ref Range    Sodium 143 135 - 145 mmol/L    Potassium 4.3 3.6 - 5.5 mmol/L    Chloride 108 96 - 112 mmol/L    Co2 25 20 - 33 mmol/L    Anion Gap 10.0 7.0 - 16.0    Glucose 123 (H) 65 - 99 mg/dL    Bun 20 8 - 22 mg/dL    Creatinine 0.89 0.50 - 1.40 mg/dL    Calcium 9.1 8.5 - 10.5 mg/dL    Correct Calcium 9.3 8.5 - 10.5 mg/dL    AST(SGOT) 18 12 - 45 U/L    ALT(SGPT) 25 2 - 50 U/L    Alkaline Phosphatase 86 30 - 99 U/L    Total Bilirubin 0.3 0.1 - 1.5 mg/dL    Albumin 3.8 3.2 - 4.9 g/dL    Total Protein 6.1 6.0 - 8.2 g/dL    Globulin 2.3 1.9 - 3.5 g/dL    A-G Ratio 1.7 g/dL   TESTOSTERONE SERUM    Collection Time: 06/26/24  7:07 AM   Result Value Ref Range    Testosterone,Total 5 (L) 175 - 781 ng/dL   ESTIMATED  GFR    Collection Time: 06/26/24  7:07 AM   Result Value Ref Range    GFR (CKD-EPI) 99 >60 mL/min/1.73 m 2   HEMOGLOBIN A1C    Collection Time: 07/13/24  7:25 AM   Result Value Ref Range    Glycohemoglobin 6.7 (H) 4.0 - 5.6 %    Est Avg Glucose 146 mg/dL   PROSTATE SPECIFIC AG SCREENING    Collection Time: 07/13/24  7:25 AM   Result Value Ref Range    Prostatic Specific Antigen Tot 0.94 0.00 - 4.00 ng/mL   MICROALBUMIN CREAT RATIO URINE    Collection Time: 07/13/24  7:26 AM   Result Value Ref Range    Creatinine, Urine 157.84 mg/dL    Microalbumin, Urine Random <1.2 mg/dL    Micro Alb Creat Ratio see below 0 - 30 mg/g   Lipid Profile    Collection Time: 08/10/24  7:20 AM   Result Value Ref Range    Cholesterol,Tot 153 100 - 199 mg/dL    Triglycerides 152 (H) 0 - 149 mg/dL    HDL 50 >=40 mg/dL    LDL 73 <100 mg/dL   FASTING STATUS    Collection Time: 08/10/24  7:20 AM   Result Value Ref Range    Fasting Status Fasting    POCT Retinal Eye Exam    Collection Time: 08/12/24  5:06 PM   Result Value Ref Range    RETINAL SCREEN Negative Negative, Indeterminate   PROSTATE SPECIFIC AG SCREENING    Collection Time: 10/04/24  4:04 PM   Result Value Ref Range    Prostatic Specific Antigen Tot 1.26 0.00 - 4.00 ng/mL   PROSTATE SPECIFIC AG SCREENING    Collection Time: 10/09/24  3:12 PM   Result Value Ref Range    Prostatic Specific Antigen Tot 1.12 0.00 - 4.00 ng/mL   Comp Metabolic Panel    Collection Time: 10/09/24  3:12 PM   Result Value Ref Range    Sodium 135 135 - 145 mmol/L    Potassium 4.5 3.6 - 5.5 mmol/L    Chloride 102 96 - 112 mmol/L    Co2 20 20 - 33 mmol/L    Anion Gap 13.0 7.0 - 16.0    Glucose 119 (H) 65 - 99 mg/dL    Bun 19 8 - 22 mg/dL    Creatinine 0.80 0.50 - 1.40 mg/dL    Calcium 9.6 8.5 - 10.5 mg/dL    Correct Calcium 9.6 8.5 - 10.5 mg/dL    AST(SGOT) 11 (L) 12 - 45 U/L    ALT(SGPT) 23 2 - 50 U/L    Alkaline Phosphatase 61 30 - 99 U/L    Total Bilirubin 0.6 0.1 - 1.5 mg/dL    Albumin 4.0 3.2 - 4.9 g/dL     Total Protein 6.1 6.0 - 8.2 g/dL    Globulin 2.1 1.9 - 3.5 g/dL    A-G Ratio 1.9 g/dL   TESTOSTERONE SERUM    Collection Time: 10/09/24  3:12 PM   Result Value Ref Range    Testosterone,Total 9 (L) 175 - 781 ng/dL   ESTIMATED GFR    Collection Time: 10/09/24  3:12 PM   Result Value Ref Range    GFR (CKD-EPI) 102 >60 mL/min/1.73 m 2   PROSTATE SPECIFIC AG SCREENING    Collection Time: 12/11/24  2:21 PM   Result Value Ref Range    Prostatic Specific Antigen Tot 0.38 0.00 - 4.00 ng/mL   Comp Metabolic Panel    Collection Time: 12/11/24  2:21 PM   Result Value Ref Range    Sodium 141 135 - 145 mmol/L    Potassium 4.4 3.6 - 5.5 mmol/L    Chloride 107 96 - 112 mmol/L    Co2 23 20 - 33 mmol/L    Anion Gap 11.0 7.0 - 16.0    Glucose 110 (H) 65 - 99 mg/dL    Bun 19 8 - 22 mg/dL    Creatinine 0.82 0.50 - 1.40 mg/dL    Calcium 9.2 8.5 - 10.5 mg/dL    Correct Calcium 9.2 8.5 - 10.5 mg/dL    AST(SGOT) 18 12 - 45 U/L    ALT(SGPT) 27 2 - 50 U/L    Alkaline Phosphatase 76 30 - 99 U/L    Total Bilirubin 0.4 0.1 - 1.5 mg/dL    Albumin 4.0 3.2 - 4.9 g/dL    Total Protein 6.3 6.0 - 8.2 g/dL    Globulin 2.3 1.9 - 3.5 g/dL    A-G Ratio 1.7 g/dL   TESTOSTERONE SERUM    Collection Time: 12/11/24  2:21 PM   Result Value Ref Range    Testosterone,Total 3 (L) 175 - 781 ng/dL   ESTIMATED GFR    Collection Time: 12/11/24  2:21 PM   Result Value Ref Range    GFR (CKD-EPI) 101 >60 mL/min/1.73 m 2           Assessment & Plan     1. Essential hypertension        2. Dyslipidemia        3. Hypercoagulable state due to persistent atrial fibrillation (HCC)        4. Persistent atrial fibrillation (HCC)            Medical Decision Making: Today's Assessment/Status/Plan:        It was my pleasure to meet with Mr. Stokes.    We addressed the management of hypertension at today's visit. Blood pressure is well controlled.  We specifically assessed the labs on hypertension treatment    We addressed the management of dyslipidemia at today's visit. He is on  appropriate lipid lowering medication.    We addressed the management of atrial fibrillation.  He is on proper anticoagulation and rate or rhythm control as appropriate.  We addressed the potential side effects and laboratory follow-up for these medications.    We addressed the management of chronic anticoagulation at today's visit. He understands the risks and benefits of chronic anticoagulation.  We reviewed and verified the results of annual testing for anemia and kidney function.  HE COULD STOP ELIQUIS but reasonable to take long term    If had recurrent afib consider ablation    Dr To or I will see Mr. Stokes back in 1 year time and encouraged him to follow up with us over the phone or electronically using my MyChart as issues arise.    It is my pleasure to participate in the care of Mr. Stokes.  Please do not hesitate to contact me with questions or concerns.    Tyler Brower MD PhD [facility]  Cardiologist [facility]    Please note that this dictation was created using voice recognition software. There may be errors I did not discover before finalizing the note.     () Today's E/M visit is associated with medical care services that serve as the continuing focal point for all needed health care services and/or with medical care services that  are part of ongoing care related to a patient's single, serious condition, or a complex condition: This includes  furnishing services to patients on an ongoing basis that result in care that is personalized  to the patient. The services result in a comprehensive, longitudinal, and continuous  relationship with the patient and involve delivery of team-based care that is accessible, coordinated with other practitioners and providers, and integrated with the broader health  care landscape.

## 2024-12-15 DIAGNOSIS — I48.19 PERSISTENT ATRIAL FIBRILLATION (HCC): ICD-10-CM

## 2024-12-16 ENCOUNTER — HOSPITAL ENCOUNTER (OUTPATIENT)
Dept: RADIOLOGY | Facility: MEDICAL CENTER | Age: 59
End: 2024-12-16
Attending: FAMILY MEDICINE
Payer: MEDICAID

## 2024-12-16 DIAGNOSIS — R29.898 WEAKNESS OF BOTH HANDS: ICD-10-CM

## 2024-12-16 PROCEDURE — 72156 MRI NECK SPINE W/O & W/DYE: CPT

## 2024-12-16 PROCEDURE — A9579 GAD-BASE MR CONTRAST NOS,1ML: HCPCS | Mod: JZ,UD | Performed by: FAMILY MEDICINE

## 2024-12-16 PROCEDURE — 700117 HCHG RX CONTRAST REV CODE 255: Mod: JZ,UD | Performed by: FAMILY MEDICINE

## 2024-12-16 RX ORDER — BISOPROLOL FUMARATE 5 MG/1
5 TABLET, FILM COATED ORAL DAILY
Qty: 90 TABLET | Refills: 3 | Status: SHIPPED | OUTPATIENT
Start: 2024-12-16

## 2024-12-16 RX ADMIN — GADOTERIDOL 20 ML: 279.3 INJECTION, SOLUTION INTRAVENOUS at 15:54

## 2024-12-16 NOTE — TELEPHONE ENCOUNTER
Is the patient due for a refill? Yes    Was the patient seen the past year? Yes    Date of last office visit: 12.12.2024    Does the patient have an upcoming appointment?  Yes   If yes, When? 12.16.2024    Provider to refill:CW    Does the patient have [facility] and need 100-day supply? (This applies to ALL medications) Patient does not have SCP

## 2024-12-17 DIAGNOSIS — M48.02 SPINAL STENOSIS OF CERVICAL REGION WITH RADICULOPATHY: ICD-10-CM

## 2024-12-17 DIAGNOSIS — M54.12 SPINAL STENOSIS OF CERVICAL REGION WITH RADICULOPATHY: ICD-10-CM

## 2024-12-23 ENCOUNTER — APPOINTMENT (OUTPATIENT)
Dept: PHYSICAL MEDICINE AND REHAB | Facility: MEDICAL CENTER | Age: 59
End: 2024-12-23
Payer: MEDICAID

## 2024-12-23 VITALS
HEIGHT: 67 IN | WEIGHT: 236 LBS | TEMPERATURE: 98 F | OXYGEN SATURATION: 94 % | HEART RATE: 85 BPM | DIASTOLIC BLOOD PRESSURE: 83 MMHG | BODY MASS INDEX: 37.04 KG/M2 | SYSTOLIC BLOOD PRESSURE: 112 MMHG

## 2024-12-23 DIAGNOSIS — M48.02 FORAMINAL STENOSIS OF CERVICAL REGION: ICD-10-CM

## 2024-12-23 DIAGNOSIS — G56.03 BILATERAL CARPAL TUNNEL SYNDROME: ICD-10-CM

## 2024-12-23 DIAGNOSIS — M47.812 SPONDYLOSIS OF CERVICAL SPINE: ICD-10-CM

## 2024-12-23 DIAGNOSIS — M47.22 CERVICAL RADICULOPATHY DUE TO DEGENERATIVE JOINT DISEASE OF SPINE: ICD-10-CM

## 2024-12-23 DIAGNOSIS — M54.2 CHRONIC NECK PAIN: ICD-10-CM

## 2024-12-23 DIAGNOSIS — G89.29 CHRONIC NECK PAIN: ICD-10-CM

## 2024-12-23 PROCEDURE — 1125F AMNT PAIN NOTED PAIN PRSNT: CPT | Performed by: STUDENT IN AN ORGANIZED HEALTH CARE EDUCATION/TRAINING PROGRAM

## 2024-12-23 PROCEDURE — 3079F DIAST BP 80-89 MM HG: CPT | Performed by: STUDENT IN AN ORGANIZED HEALTH CARE EDUCATION/TRAINING PROGRAM

## 2024-12-23 PROCEDURE — 99213 OFFICE O/P EST LOW 20 MIN: CPT | Performed by: STUDENT IN AN ORGANIZED HEALTH CARE EDUCATION/TRAINING PROGRAM

## 2024-12-23 PROCEDURE — 76882 US LMTD JT/FCL EVL NVASC XTR: CPT | Performed by: STUDENT IN AN ORGANIZED HEALTH CARE EDUCATION/TRAINING PROGRAM

## 2024-12-23 PROCEDURE — 3074F SYST BP LT 130 MM HG: CPT | Performed by: STUDENT IN AN ORGANIZED HEALTH CARE EDUCATION/TRAINING PROGRAM

## 2024-12-23 RX ORDER — GABAPENTIN 100 MG/1
100-300 CAPSULE ORAL NIGHTLY PRN
Qty: 90 CAPSULE | Refills: 3 | Status: SHIPPED | OUTPATIENT
Start: 2024-12-23

## 2024-12-23 ASSESSMENT — PATIENT HEALTH QUESTIONNAIRE - PHQ9
5. POOR APPETITE OR OVEREATING: 2 - MORE THAN HALF THE DAYS
SUM OF ALL RESPONSES TO PHQ QUESTIONS 1-9: 14
CLINICAL INTERPRETATION OF PHQ2 SCORE: 3

## 2024-12-23 ASSESSMENT — PAIN SCALES - GENERAL: PAINLEVEL_OUTOF10: 10=SEVERE PAIN

## 2024-12-23 ASSESSMENT — FIBROSIS 4 INDEX: FIB4 SCORE: 0.97

## 2024-12-23 NOTE — PROGRESS NOTES
[facility] (Physical Medicine and Rehabilitation)  Sports Medicine& Interventional Spine   Follow Up Patient Visit      Chief complaint:   Chief Complaint   Patient presents with    Follow-Up     Image review          HISTORY        HPI  Patient identification: Ta Stokes ,  1965,   With Diagnoses of Spondylosis of cervical spine, Chronic neck pain, Cervical radiculopathy due to degenerative joint disease of spine, Foraminal stenosis of cervical region, and Bilateral carpal tunnel syndrome were pertinent to this visit.         At last visit dated 10/16/2024    aT Stokes 59 y.o. male patient presents today for initial evaluation of 1 year worsening of bilateral hand pain and numbness.  Examination and history are consistent with concern for bilateral carpal tunnel syndrome.  There may be some cervical radiculopathy component given chronic neck pain however is exam was much more prominent for concern for peripheral mononeuropathy.  I do recommend bilateral upper extremity EMGs to further evaluate.  Has a cervical MRI that is scheduled for December.  Recommend EMG and then patient follow-up after MRI to discuss continued management and treatment.     Interval history:  EMG was denied by insurance.  His primary care provider did recommend an MRI of the cervical spine for further evaluation.  He is here today to discuss his results.  He notes continued severe 9 out of 10 in severity pain throughout bilateral hands.  He states the pain is significantly worse from his previous evaluation.  He notes an aching, burning, pins-and-needles, and numbness and tingling throughout bilateral hands. He also notes continued neck pain and tightness particularly when working. Numbness is worse slightly at the dominant right hand, symptoms seem to start and the dorsal thumb and extend to rest of hand.          ROS Red Flags :   Fever, Chills, Sweats: Denies  Involuntary Weight Loss: Denies  Bowel/Bladder  Incontinence: Denies  Saddle Anesthesia: Denies        PMHx:   Past Medical History:   Diagnosis Date    Arrhythmia     A-fib    Cancer (HCC) 01/2024    prostate cancer    Dental disorder     upper/lower dentures    Dyslipidemia     GERD (gastroesophageal reflux disease)     Heart burn     High cholesterol     Hypertension     Indigestion     Snoring        PSHx:   History reviewed. No pertinent surgical history.    Family history   Family History   Problem Relation Age of Onset    Hypertension Mother     Cancer Maternal Grandmother          Medications:   Outpatient Medications Marked as Taking for the 12/23/24 encounter (Office Visit) with Mark Whitley D.O.   Medication Sig Dispense Refill    gabapentin (NEURONTIN) 100 MG Cap Take 1-3 Capsules by mouth at bedtime as needed (pain). 90 Capsule 3    bisoprolol (ZEBETA) 5 MG Tab Take 1 Tablet by mouth every day. 90 Tablet 3    Degarelix Acetate (FIRMAGON SC)       Empagliflozin (JARDIANCE) 10 MG Tab tablet Take 1 Tablet by mouth every day. 90 Tablet 3    atorvastatin (LIPITOR) 10 MG Tab Take 1 Tablet by mouth every evening. 90 Tablet 3    lisinopril (PRINIVIL) 5 MG Tab Take 1 tablet by mouth once daily 90 Tablet 3    apixaban (ELIQUIS) 5mg Tab Take 1 Tablet by mouth 2 times a day. 60 Tablet 11    tamsulosin (FLOMAX) 0.4 MG capsule Take 1 Capsule by mouth at bedtime.      Calcium Carb-Cholecalciferol (CALCIUM 1000 + D) 1000-20 MG-MCG Tab Take  by mouth.          Current Outpatient Medications on File Prior to Visit   Medication Sig Dispense Refill    bisoprolol (ZEBETA) 5 MG Tab Take 1 Tablet by mouth every day. 90 Tablet 3    Degarelix Acetate (FIRMAGON SC)       Empagliflozin (JARDIANCE) 10 MG Tab tablet Take 1 Tablet by mouth every day. 90 Tablet 3    atorvastatin (LIPITOR) 10 MG Tab Take 1 Tablet by mouth every evening. 90 Tablet 3    lisinopril (PRINIVIL) 5 MG Tab Take 1 tablet by mouth once daily 90 Tablet 3    apixaban (ELIQUIS) 5mg Tab Take 1 Tablet by mouth 2  times a day. 60 Tablet 11    tamsulosin (FLOMAX) 0.4 MG capsule Take 1 Capsule by mouth at bedtime.      Calcium Carb-Cholecalciferol (CALCIUM 1000 + D) 1000-20 MG-MCG Tab Take  by mouth.      leuprolide (LUPRON) 22.5 MG Kit Inject 22.5 mg into the shoulder, thigh, or buttocks. (Patient not taking: Reported on 2024)      cyclobenzaprine (FLEXERIL) 10 mg Tab Take 1 Tablet by mouth 3 times a day as needed for Muscle Spasms. (Patient not taking: Reported on 10/16/2024) 30 Tablet 1    syringe MISC 4 mL with degarelix 80 MG SOLR 80 mg Inject 80 mg under the skin one time. (Patient not taking: Reported on 2024)      Misc. Devices Misc Overnight oximetry, O2 concentrator to keep nocturnal O2 sats > 92% (Patient not taking: Reported on 2024) 1 Device 0     No current facility-administered medications on file prior to visit.         Allergies:   Allergies   Allergen Reactions    Metformin Diarrhea     diarrhea       Social Hx:   Social History     Socioeconomic History    Marital status: Single     Spouse name: Not on file    Number of children: Not on file    Years of education: Not on file    Highest education level: Associate degree: occupational, technical, or vocational program   Occupational History    Not on file   Tobacco Use    Smoking status: Former     Current packs/day: 0.00     Average packs/day: 1 pack/day for 39.0 years (39.0 ttl pk-yrs)     Types: Cigarettes     Start date: 1979     Quit date: 2018     Years since quittin.9    Smokeless tobacco: Never    Tobacco comments:     1ppd   Vaping Use    Vaping status: Never Used   Substance and Sexual Activity    Alcohol use: Not Currently     Comment: once in awhile, 1 beer    Drug use: No    Sexual activity: Not Currently     Partners: Female   Other Topics Concern    Not on file   Social History Narrative    Not on file     Social Drivers of Health     Financial Resource Strain: High Risk (3/22/2023)    Overall Financial Resource  "Strain (CARDIA)     Difficulty of Paying Living Expenses: Hard   Food Insecurity: Food Insecurity Present (3/22/2023)    Hunger Vital Sign     Worried About Running Out of Food in the Last Year: Sometimes true     Ran Out of Food in the Last Year: Never true   Transportation Needs: No Transportation Needs (3/22/2023)    PRAPARE - Transportation     Lack of Transportation (Medical): No     Lack of Transportation (Non-Medical): No   Physical Activity: Sufficiently Active (3/22/2023)    Exercise Vital Sign     Days of Exercise per Week: 7 days     Minutes of Exercise per Session: 150+ min   Stress: Stress Concern Present (3/22/2023)    [employer] [place] [employer] - Occupational Stress Questionnaire     Feeling of Stress : To some extent   Social Connections: Moderately Isolated (3/22/2023)    Social Connection and Isolation Panel [NHANES]     Frequency of Communication with Friends and Family: More than three times a week     Frequency of Social Gatherings with Friends and Family: More than three times a week     Attends Quaker Services: Never     Active Member of Clubs or Organizations: No     Attends Club or Organization Meetings: Never     Marital Status: Living with partner   Intimate Partner Violence: Not on file   Housing Stability: Low Risk  (3/22/2023)    Housing Stability Vital Sign     Unable to Pay for Housing in the Last Year: No     Number of Places Lived in the Last Year: 1     Unstable Housing in the Last Year: No         EXAMINATION     Physical Exam:   Vitals: /83 (BP Location: Right arm, Patient Position: Sitting, BP Cuff Size: Adult)   Pulse 85   Temp 36.7 °C (98 °F) (Temporal)   Ht 1.702 m (5' 7\")   Wt 107 kg (236 lb)   SpO2 94%     Constitutional:   Body Habitus: Body mass index is 36.96 kg/m².  Cooperation: Fully cooperates with exam  Appearance: Well-groomed no disheveled    Respiratory-  breathing comfortable on room air, no audible wheezing  Cardiovascular- capillary " refills less than 2 seconds. No lower extremity edema is noted.   Psychiatric- alert and oriented ×3. Normal affect.    MSK and Neuro: -  Cervical spine   Inspection: No deformities of the skin over the cervical spine. No rashes or lesions.  full  A/P ROM in all directions, with  pain    Spurling’s sign: negative bilaterally  No signs of muscular atrophy in bilateral upper extremities   No tenderness to palpation of the cervical facets     Neuro   Key points for the international standards for neurological classification of spinal cord injury (ISNCSCI) to light touch.   Dermatome R L   C4 2 2   C5 2 2   C6 1 1   C7 1 1   C8 1 1   T1 2 2      Motor Exam Upper Extremities   ? Myotome R L   Shoulder flexion C5 5 5   Elbow flexion C5 5 5   Wrist extension C6 4 4   Elbow extension C7 4 4   Finger flexion C8 5 5   Finger abduction T1 5 5      Reflexes  ?   R L   Biceps   2+ 2+   Brachioradialis   2+ 2+     Positive tinels testing bilaterally  Positive CTS compression bilaterally             MEDICAL DECISION MAKING    DATA    Labs:   Lab Results   Component Value Date/Time    SODIUM 141 12/11/2024 02:21 PM    POTASSIUM 4.4 12/11/2024 02:21 PM    CHLORIDE 107 12/11/2024 02:21 PM    CO2 23 12/11/2024 02:21 PM    GLUCOSE 110 (H) 12/11/2024 02:21 PM    BUN 19 12/11/2024 02:21 PM    CREATININE 0.82 12/11/2024 02:21 PM        Lab Results   Component Value Date/Time    PROTHROMBTM 13.2 02/01/2019 07:07 PM    INR 0.99 02/01/2019 07:07 PM        Lab Results   Component Value Date/Time    WBC 6.1 09/06/2023 10:21 AM    RBC 5.67 09/06/2023 10:21 AM    HEMOGLOBIN 16.0 04/20/2024 09:26 AM    HEMATOCRIT 48.0 04/20/2024 09:26 AM    MCV 92.8 09/06/2023 10:21 AM    MCH 30.0 09/06/2023 10:21 AM    MCHC 32.3 09/06/2023 10:21 AM    MPV 9.8 09/06/2023 10:21 AM    NEUTSPOLYS 57.30 09/06/2023 10:21 AM    LYMPHOCYTES 30.50 09/06/2023 10:21 AM    MONOCYTES 7.40 09/06/2023 10:21 AM    EOSINOPHILS 3.80 09/06/2023 10:21 AM    BASOPHILS 0.50  2023 10:21 AM        Lab Results   Component Value Date/Time    HBA1C 6.7 (H) 2024 07:25 AM          Imaging:   I personally reviewed following images      MRI of the cervical spine dated 2024 shows diffuse multilevel degenerative changes of the cervical spine with spinal canal narrowing at C4-C5 C5-C6, severe right foraminal narrowing at C4-C5 and moderate bilateral foraminal narrowing at C5-C6         I reviewed the following radiology reports                       Results for orders placed during the hospital encounter of 24    MR-CERVICAL SPINE-WITH & W/O    Impression  1.  Moderate canal narrowing at C4-5 and C5-6.    2.  Severe right foraminal narrowing at C4-5.    3.  Moderate bilateral foraminal narrowing at C5-6.                                                                               Results for orders placed during the hospital encounter of 19    CT-ABDOMEN-PELVIS WITH    Impression  1.  Asymmetric thickening of the right lower anterior intercostal muscles with a small amount of fluid seen extending between the deep and superficial layers of the right lower chest and anterior lateral abdominal wall muscles likely representing  hematoma. A definite associated rib fracture is not seen.    2.  Fatty liver.    3.  Small left lobe hepatic cyst or hemangioma.    4.  No evidence of bowel obstruction or focal inflammatory change within the abdomen or pelvis.                                                                  DIAGNOSIS   Visit Diagnoses     ICD-10-CM   1. Spondylosis of cervical spine  M47.812   2. Chronic neck pain  M54.2    G89.29   3. Cervical radiculopathy due to degenerative joint disease of spine  M47.22   4. Foraminal stenosis of cervical region  M48.02   5. Bilateral carpal tunnel syndrome  G56.03         ASSESSMENT and PLAN:     Ta CAAL 1965 male who presents today for repeat evaluation of bilateral hand paresthesias, numbness and  tingling, and chronic neck pain.  Last visit he was recommended for bilateral upper extremities EMG however this was denied by insurance.  He has continued 8-9 out of 10 in severity pain throughout bilateral hands.  He also notes continued neck pain.  An MRI of the cervical spine was also ordered for further evaluation.  This did show multilevel degenerative changes with spinal canal narrowing at C4-C5 C5-C6, severe right foraminal narrowing at C4-C5 and moderate bilateral foraminal narrowing at C5-C6.  On examination he also has positive carpal tunnel syndrome testing.  Limited diagnostic ultrasound of both wrist showed bilateral enlarged median nerves again consistent with bilateral carpal tunnel syndrome.  There is potential concern for a double crush syndrome involving both a cervical radiculopathy as well as a bilateral median mononeuropathy.  I would recommend bilateral upper extremity EMG for further evaluation.  Recommend patient return to clinic for this diagnostic test.      Ta was seen today for follow-up.    Diagnoses and all orders for this visit:    Spondylosis of cervical spine  -     Referral to EMG - Physiatry (PMR)    Chronic neck pain  -     Referral to EMG - Physiatry (PMR)    Cervical radiculopathy due to degenerative joint disease of spine  -     Referral to EMG - Physiatry (PMR)    Foraminal stenosis of cervical region  -     Referral to EMG - Physiatry (PMR)    Bilateral carpal tunnel syndrome  -     Referral to EMG - Physiatry (PMR)    Other orders  -     gabapentin (NEURONTIN) 100 MG Cap; Take 1-3 Capsules by mouth at bedtime as needed (pain).          12/23/24   I performed a limited diagnostic ultrasound of the RIGHT wrist including the median nerve to evaluate the cause of the patient's neuralgia. The median nerve which shows a cross-sectional area of 15 mm² at the level of the carpal tunnel outlet and 8 mm² at the level of the pronator quadratus. This is consistent with carpal tunnel  syndrome.  The images were uploaded to the medical record.     12/23/24   I performed a limited diagnostic ultrasound of the LEFT wrist including the median nerve to evaluate the cause of the patient's neuralgia. The median nerve which shows a cross-sectional area of 13 mm² at the level of the carpal tunnel outlet and 8 mm² at the level of the pronator quadratus. This is consistent with carpal tunnel syndrome.  The images were uploaded to the medical record.           Follow up: For EMG as above             Please note that this dictation was created using voice recognition software. I have made every reasonable attempt to correct obvious errors but there may be errors of grammar and content that I may have overlooked prior to finalization of this note.    Mark Whitley DO  Physical Medicine and Rehabilitation  Sports Medicine and Spine  [facility]

## 2025-01-08 ENCOUNTER — APPOINTMENT (OUTPATIENT)
Dept: PHYSICAL MEDICINE AND REHAB | Facility: MEDICAL CENTER | Age: 60
End: 2025-01-08
Payer: MEDICAID

## 2025-01-14 ENCOUNTER — APPOINTMENT (OUTPATIENT)
Dept: RADIOLOGY | Facility: MEDICAL CENTER | Age: 60
End: 2025-01-14
Attending: EMERGENCY MEDICINE
Payer: MEDICAID

## 2025-01-14 ENCOUNTER — HOSPITAL ENCOUNTER (EMERGENCY)
Facility: MEDICAL CENTER | Age: 60
End: 2025-01-14
Attending: EMERGENCY MEDICINE
Payer: MEDICAID

## 2025-01-14 VITALS
BODY MASS INDEX: 36.6 KG/M2 | SYSTOLIC BLOOD PRESSURE: 132 MMHG | RESPIRATION RATE: 16 BRPM | WEIGHT: 233.69 LBS | DIASTOLIC BLOOD PRESSURE: 80 MMHG | TEMPERATURE: 96.7 F | OXYGEN SATURATION: 93 % | HEART RATE: 85 BPM

## 2025-01-14 DIAGNOSIS — M54.12 CERVICAL RADICULOPATHY: Primary | ICD-10-CM

## 2025-01-14 DIAGNOSIS — M25.512 ACUTE PAIN OF LEFT SHOULDER: ICD-10-CM

## 2025-01-14 DIAGNOSIS — I48.91 ATRIAL FIBRILLATION, UNSPECIFIED TYPE (HCC): ICD-10-CM

## 2025-01-14 DIAGNOSIS — M25.522 LEFT ELBOW PAIN: ICD-10-CM

## 2025-01-14 LAB
ANION GAP SERPL CALC-SCNC: 13 MMOL/L (ref 7–16)
BASOPHILS # BLD AUTO: 0.5 % (ref 0–1.8)
BASOPHILS # BLD: 0.03 K/UL (ref 0–0.12)
BUN SERPL-MCNC: 24 MG/DL (ref 8–22)
CALCIUM SERPL-MCNC: 10.2 MG/DL (ref 8.5–10.5)
CHLORIDE SERPL-SCNC: 103 MMOL/L (ref 96–112)
CO2 SERPL-SCNC: 24 MMOL/L (ref 20–33)
CREAT SERPL-MCNC: 0.84 MG/DL (ref 0.5–1.4)
EKG IMPRESSION: NORMAL
EOSINOPHIL # BLD AUTO: 0.29 K/UL (ref 0–0.51)
EOSINOPHIL NFR BLD: 5.1 % (ref 0–6.9)
ERYTHROCYTE [DISTWIDTH] IN BLOOD BY AUTOMATED COUNT: 44.4 FL (ref 35.9–50)
GFR SERPLBLD CREATININE-BSD FMLA CKD-EPI: 101 ML/MIN/1.73 M 2
GLUCOSE SERPL-MCNC: 128 MG/DL (ref 65–99)
HCT VFR BLD AUTO: 49 % (ref 42–52)
HGB BLD-MCNC: 16.3 G/DL (ref 14–18)
IMM GRANULOCYTES # BLD AUTO: 0.05 K/UL (ref 0–0.11)
IMM GRANULOCYTES NFR BLD AUTO: 0.9 % (ref 0–0.9)
LYMPHOCYTES # BLD AUTO: 0.76 K/UL (ref 1–4.8)
LYMPHOCYTES NFR BLD: 13.3 % (ref 22–41)
MCH RBC QN AUTO: 31.2 PG (ref 27–33)
MCHC RBC AUTO-ENTMCNC: 33.3 G/DL (ref 32.3–36.5)
MCV RBC AUTO: 93.7 FL (ref 81.4–97.8)
MONOCYTES # BLD AUTO: 0.51 K/UL (ref 0–0.85)
MONOCYTES NFR BLD AUTO: 8.9 % (ref 0–13.4)
NEUTROPHILS # BLD AUTO: 4.08 K/UL (ref 1.82–7.42)
NEUTROPHILS NFR BLD: 71.3 % (ref 44–72)
NRBC # BLD AUTO: 0 K/UL
NRBC BLD-RTO: 0 /100 WBC (ref 0–0.2)
PLATELET # BLD AUTO: 215 K/UL (ref 164–446)
PMV BLD AUTO: 9.4 FL (ref 9–12.9)
POTASSIUM SERPL-SCNC: 4.6 MMOL/L (ref 3.6–5.5)
RBC # BLD AUTO: 5.23 M/UL (ref 4.7–6.1)
SODIUM SERPL-SCNC: 140 MMOL/L (ref 135–145)
TROPONIN T SERPL-MCNC: 7 NG/L (ref 6–19)
WBC # BLD AUTO: 5.7 K/UL (ref 4.8–10.8)

## 2025-01-14 PROCEDURE — 99284 EMERGENCY DEPT VISIT MOD MDM: CPT

## 2025-01-14 PROCEDURE — 71045 X-RAY EXAM CHEST 1 VIEW: CPT

## 2025-01-14 PROCEDURE — 85025 COMPLETE CBC W/AUTO DIFF WBC: CPT

## 2025-01-14 PROCEDURE — 93005 ELECTROCARDIOGRAM TRACING: CPT | Mod: TC

## 2025-01-14 PROCEDURE — 700102 HCHG RX REV CODE 250 W/ 637 OVERRIDE(OP): Mod: UD | Performed by: EMERGENCY MEDICINE

## 2025-01-14 PROCEDURE — A9270 NON-COVERED ITEM OR SERVICE: HCPCS | Mod: UD | Performed by: EMERGENCY MEDICINE

## 2025-01-14 PROCEDURE — 80048 BASIC METABOLIC PNL TOTAL CA: CPT

## 2025-01-14 PROCEDURE — 84484 ASSAY OF TROPONIN QUANT: CPT

## 2025-01-14 PROCEDURE — 72141 MRI NECK SPINE W/O DYE: CPT

## 2025-01-14 PROCEDURE — 700111 HCHG RX REV CODE 636 W/ 250 OVERRIDE (IP): Mod: JZ,UD | Performed by: EMERGENCY MEDICINE

## 2025-01-14 PROCEDURE — 93005 ELECTROCARDIOGRAM TRACING: CPT | Mod: TC | Performed by: EMERGENCY MEDICINE

## 2025-01-14 PROCEDURE — 36415 COLL VENOUS BLD VENIPUNCTURE: CPT

## 2025-01-14 PROCEDURE — 73030 X-RAY EXAM OF SHOULDER: CPT | Mod: LT

## 2025-01-14 RX ORDER — DEXAMETHASONE SODIUM PHOSPHATE 10 MG/ML
16 INJECTION, SOLUTION INTRAMUSCULAR; INTRAVENOUS ONCE
Status: COMPLETED | OUTPATIENT
Start: 2025-01-14 | End: 2025-01-14

## 2025-01-14 RX ORDER — OXYCODONE AND ACETAMINOPHEN 5; 325 MG/1; MG/1
1 TABLET ORAL ONCE
Status: COMPLETED | OUTPATIENT
Start: 2025-01-14 | End: 2025-01-14

## 2025-01-14 RX ADMIN — OXYCODONE HYDROCHLORIDE AND ACETAMINOPHEN 1 TABLET: 5; 325 TABLET ORAL at 10:05

## 2025-01-14 RX ADMIN — OXYCODONE HYDROCHLORIDE AND ACETAMINOPHEN 1 TABLET: 5; 325 TABLET ORAL at 14:26

## 2025-01-14 RX ADMIN — DEXAMETHASONE SODIUM PHOSPHATE 16 MG: 10 INJECTION INTRAMUSCULAR; INTRAVENOUS at 15:09

## 2025-01-14 ASSESSMENT — PAIN DESCRIPTION - PAIN TYPE
TYPE: ACUTE PAIN
TYPE: ACUTE PAIN

## 2025-01-14 ASSESSMENT — FIBROSIS 4 INDEX: FIB4 SCORE: 0.97

## 2025-01-14 ASSESSMENT — PAIN DESCRIPTION - DESCRIPTORS: DESCRIPTORS: ACHING

## 2025-01-14 ASSESSMENT — LIFESTYLE VARIABLES: DO YOU DRINK ALCOHOL: NO

## 2025-01-14 NOTE — ED NOTES
Patient sitting up in bed and is complaining of worsening pain. Dr. Encinas notified. Patient medicated per MAR.

## 2025-01-14 NOTE — ED PROVIDER NOTES
ER Provider Note    Scribed for  Richard Encinas D.O. by Brian King. 1/14/2025   9:54 AM    Primary Care Provider: Natalya Ortega M.D.    CHIEF COMPLAINT  Chief Complaint   Patient presents with    Neck Pain     EXTERNAL RECORDS REVIEWED  Outpatient labs & studies Patient had an MRI of the cervical spine 12/16/2024 with findings documented below.    Cervical spine alignment is normal. Craniocervical junction is within normal limits.     Vertebral body heights are preserved. Minimal type I marrow changes are noted at C5-C6.   Spinal cord signal intensity is within normal limits.     Findings specific to each level are described below:     C2-3: Mild facet degeneration noted bilaterally. No stenosis.   C3-4:  Right uncovertebral degeneration and facet degeneration noted. There is mild right foraminal narrowing. There is no significant canal narrowing.   C4-5:  Endplate disc osteophyte complex with bilateral advanced uncovertebral degeneration. There is severe right foraminal narrowing. There is moderate canal stenosis with cord impingement.   C5-6: Endplate disc osteophyte complex and bilateral uncovertebral and facet degenerative change. There is moderate bilateral foraminal narrowing. There is moderate canal narrowing.   C6-7: Endplate disc osteophyte complex and bilateral uncovertebral and facet degeneration. There is mild canal narrowing. There is no significant foraminal narrowing.   C7-T1: No significant abnormality.     HPI/ROS  LIMITATION TO HISTORY   Select: : None  OUTSIDE HISTORIAN(S):  None.     Ta Stokes is a 59 y.o. male who presents to the ED for evaluation of neck pain onset six days ago . Patient reports that he reached under his dash board to change a fuse, and about thirty minutes after doing that he began to have pain in his neck that radiates into his upper shoulders and into his left upper arm. He describes feeling like someone is yanking on his arm. He has pain at baseline,  but states that his pain is exacerbated with range of motion of his arm. He further explains that it feels like he may have pinched a nerve. He denies numbness to the left arm. Patient had a CT of his neck within the past two months. He recently finished radiation therapy for prostate cancer.     PAST MEDICAL HISTORY  Past Medical History:   Diagnosis Date    Arrhythmia     A-fib    Cancer (HCC) 01/2024    prostate cancer    Dental disorder     upper/lower dentures    Dyslipidemia     GERD (gastroesophageal reflux disease)     Heart burn     High cholesterol     Hypertension     Indigestion     Snoring        SURGICAL HISTORY  History reviewed. No pertinent surgical history.    FAMILY HISTORY  Family History   Problem Relation Age of Onset    Hypertension Mother     Cancer Maternal Grandmother        SOCIAL HISTORY   reports that he quit smoking about 7 years ago. His smoking use included cigarettes. He started smoking about 46 years ago. He has a 39 pack-year smoking history. He has never used smokeless tobacco. He reports that he does not currently use alcohol. He reports that he does not use drugs.    CURRENT MEDICATIONS  Discharge Medication List as of 1/14/2025  3:05 PM        CONTINUE these medications which have NOT CHANGED    Details   gabapentin (NEURONTIN) 100 MG Cap Take 1-3 Capsules by mouth at bedtime as needed (pain)., Disp-90 Capsule, R-3, Normal      bisoprolol (ZEBETA) 5 MG Tab Take 1 Tablet by mouth every day., Disp-90 Tablet, R-3, Normal      leuprolide (LUPRON) 22.5 MG Kit Inject 22.5 mg into the shoulder, thigh, or buttocks., Historical Med      Degarelix Acetate (FIRMAGON SC) Historical Med      Empagliflozin (JARDIANCE) 10 MG Tab tablet Take 1 Tablet by mouth every day.Patient has savings card. Please use if applicable.Disp-90 Tablet, R-3, Normal      atorvastatin (LIPITOR) 10 MG Tab Take 1 Tablet by mouth every evening., Disp-90 Tablet, R-3, Normal      lisinopril (PRINIVIL) 5 MG Tab Take 1  tablet by mouth once daily, Disp-90 Tablet, R-3, Normal      apixaban (ELIQUIS) 5mg Tab Take 1 Tablet by mouth 2 times a day., Disp-60 Tablet, R-11, Normal      tamsulosin (FLOMAX) 0.4 MG capsule Take 1 Capsule by mouth at bedtime., Historical Med      cyclobenzaprine (FLEXERIL) 10 mg Tab Take 1 Tablet by mouth 3 times a day as needed for Muscle Spasms., Disp-30 Tablet, R-1, Normal      syringe MISC 4 mL with degarelix 80 MG SOLR 80 mg Inject 80 mg under the skin one time., Historical Med      Calcium Carb-Cholecalciferol (CALCIUM 1000 + D) 1000-20 MG-MCG Tab Take  by mouth., Historical Med      Misc. Devices Misc Overnight oximetry, O2 concentrator to keep nocturnal O2 sats > 92%, Disp-1 Device, R-0, Print Plain Paper             ALLERGIES  Allergies   Allergen Reactions    Metformin Diarrhea     diarrhea        PHYSICAL EXAM  /81   Pulse 96   Temp 35.9 °C (96.7 °F) (Temporal)   Resp 16   Wt 106 kg (233 lb 11 oz)   SpO2 90%   BMI 36.60 kg/m²    General: No acute distress  Neuro: Awake alert and oriented x 4, cranial nerves II through XII gross intact muscle strength out of 5 in all 4 extremities sensation coordination and gait within normal limits.  Neck: Supple open no cervical tenderness.  Cardiac: Regular rate and rhythm  Pulmonary: Clear to auscultation bilaterally no distress  Abdomen: Soft nontender nondistended  Back: Nontender  Psych: Normal  Skin: Pink warm dry  Extremities: Full range of motion, muscle strength sensation intact 2+ pulses    DIAGNOSTIC STUDIES/PROCEDURES  Labs:   Labs Reviewed   CBC WITH DIFFERENTIAL - Abnormal; Notable for the following components:       Result Value    Lymphocytes 13.30 (*)     Lymphs (Absolute) 0.76 (*)     All other components within normal limits   BASIC METABOLIC PANEL - Abnormal; Notable for the following components:    Glucose 128 (*)     Bun 24 (*)     All other components within normal limits   TROPONIN   ESTIMATED GFR     I have independently  interpreted the above labs    EKG:   Results for orders placed or performed during the hospital encounter of 25   EKG   Result Value Ref Range    Report       [facility] Emergency Dept.    Test Date:  2025  Pt Name:    ALLI KENNEDY                 Department: ER  MRN:        [id number]                      Room:       [facility]  Gender:     Male                         Technician: [id number]  :        1965                   Requested By:ER TRIAGE PROTOCOL  Order #:    [id number]                    Reading MD: Richard Encinas    Measurements  Intervals                                Axis  Rate:       81                           P:          0  IL:         0                            QRS:        101  QRSD:       95                           T:          39  QT:         386  QTc:        448    Interpretive Statements  ATRIAL FIBRILLATION  Probable RVH w/ secondary repol abnormality  Compared to ECG 2024 11:34:09  Sinus rhythm no longer present  Myocardial infarct finding no longer present  Electronically Signed On 2025 14:55:15 PST by Richard Encinas       I have independently interpreted this EKG    Radiology:   This attending emergency physician has independently interpreted the diagnostic imaging associated with this visit and is awaiting the final reading from the radiologist.   Preliminary interpretation is a follows: Foraminal narrowing.  Canal narrowing.  Radiologist interpretation:   MR-CERVICAL SPINE-W/O   Final Result         Stable appearance of moderate canal narrowing at C4-5 and C5-6.      Stable appearance of severe right foraminal narrowing at C4-5.      Stable appearance of bilateral moderate foraminal narrowing at C5-6.      DX-SHOULDER 2+ LEFT   Final Result      No acute osseous abnormality.      DX-CHEST-PORTABLE (1 VIEW)   Final Result      No acute cardiopulmonary abnormality.           COURSE & MEDICAL DECISION MAKING     INITIAL ASSESSMENT, COURSE AND  PLAN  Differential diagnoses include but not limited to: Neuropathy, ACS, shoulder sprain.      Care Narrative: patient presents today with complaints of neck pain that came on one week ago after bending over to change a fuse under his dash. He reports pain in his upper shoulders and radiation into his left arm. Patient recently finished radiation therapy for prostate cancer. I informed him of my plan to evaluate via MRI today and he agrees to this plan of care.     9:54 AM - Patient was first seen and evaluated at bedside. Patient presents to the ED for evaluation of neck and upper shoulder pain with associated pain radiation and numbness into his left upper extremity. Patient's pain came on after bending over to change a fuse under his dash. Ordered for MR-Cervical spine without to evaluate. The patient will be medicated with Percocet tablet for his symptoms. Patient verbalizes understanding and support with my plan of care.        ED COURSE AND ADDITIONAL PROBLEMS    Cervical radiculopathy: Patient turns his head and he gets worsening pain down his left arm.  The neurological exam is normal.  The MRI demonstrates some foraminal narrowing at will be an appropriate dermatome C5 or C6.  But no real deficit on exam.  We did consider this pain might be cardiac however the troponin is negative.  Patient is EKG chronic A-fib.  He denies any chest pain chest heaviness shortness of breath.  He states he is able to reproduce the discomfort by moving his head or his neck really.  He was given a dose of pain control here as well as a dose of steroids and referral to the orthopedist.  He does understand that if he has any chest pain or shortness of breath he needs to immediately return to the emergency department if anything worsens needs to return.  Patient does understand that informed discharge comes with some risk of major adverse cardiac event need for intervention heart attack prolonged stability and or death.  However  he is certain this is his neck and he is able to reproduce it and thus my suspicion is low.  This is an informed discharge.  Patient understands reasons to return and follow-up.    Left shoulder/left elbow pain: When he moves his head a certain way gets different pain in his left shoulder and his left elbow.  The exam of the shoulder and elbow are benign the compartments are soft there is no warmth there is no swelling the distal circulation motor and sensation are intact.  He does have full range of motion of the joints.  Atrial fibrillation chronic.  Managed by cardiology.    DISPOSITION AND DISCUSSIONS    I have discussed management of the patient with the following physicians and ADAM's:  None.     Discussion of management with other Q or appropriate source(s): None     Escalation of care considered, and ultimately not performed: acute inpatient care management, however at this time, the patient is most appropriate for outpatient management.    Barriers to care at this time, including but not limited to:  None are known .     Decision tools and prescription drugs considered including, but not limited to:  None .     The patient will return for new or worsening symptoms and is stable at the time of discharge.    DISPOSITION:  Patient will be discharged home in stable condition.    FOLLOW UP:  Natalya Ortega M.D.  [address]  [address]  [facility] [id number] [phone]    Schedule an appointment as soon as possible for a visit in 1 day      Aamir Murray M.D.  [address] [facility] [id number] [phone]    Schedule an appointment as soon as possible for a visit in 1 day      [facility], Emergency Dept  [address] [facility] [id number] [phone]    As needed, If symptoms worsen      OUTPATIENT MEDICATIONS:  Discharge Medication List as of 1/14/2025  3:05 PM           FINAL DIAGNOSIS  1. Cervical radiculopathy    2. Acute pain of left shoulder    3. Atrial  fibrillation, unspecified type (HCC)    4. Left elbow pain         Brian WALLER (Scribe), am scribing for, and in the presence of, Richard Encinas D.O..    Electronically signed by: Brian King (Scribderrick), 1/14/2025    Richard WALLER D.O. personally performed the services described in this documentation, as scribed by Brian King in my presence, and it is both accurate and complete.      The note accurately reflects work and decisions made by me.  Richard Encinas D.O.  1/14/2025  3:51 PM

## 2025-01-14 NOTE — ED TRIAGE NOTES
Pt ambulatory to triage c/o left shoulder neck pain x 6 days. Pt states that he thinks he pulled something when working on a car. nad

## 2025-01-14 NOTE — ED NOTES
"Patient back from MRI. Patient sitting up in bed and says \"my neck's a little sore from laying in that machine.\" However, patient declined any interventions or needs at this time. Call light within reach.  "

## 2025-01-14 NOTE — DISCHARGE INSTRUCTIONS
As we discussed.  We do believe that this is likely a cervical radiculopathy/pinched nerve.  You have the MRI here and thus we have placed referral for orthopedics.  Would like you to follow-up with them to evaluate this discomfort.  Please return the emergency department any worsens or any new symptoms arise.  That medicine we gave you should take a few hours of work and should hopefully decrease this discomfort.

## 2025-01-14 NOTE — ED NOTES
IV access obtained.  Blood drawn and sent to lab.    Pt medicated per MAR.  MRI screening completed.

## 2025-01-14 NOTE — ED NOTES
Bedside report received from off going RN/tech: Era, assumed care of patient.  POC discussed with patient. Call light within reach, all needs addressed at this time.       Fall risk interventions in place: Patient's personal possessions are with in their safe reach, Place socks on patient, Keep floor surfaces clean and dry, and Accompanied to restroom (all applicable per [place] Fall risk assessment)   Continuous monitoring: Pulse Ox or Blood Pressure  IVF/IV medications: Not Applicable   Oxygen: Room Air  Bedside sitter: Not Applicable   Isolation: Not Applicable

## 2025-01-15 ENCOUNTER — HOSPITAL ENCOUNTER (EMERGENCY)
Facility: MEDICAL CENTER | Age: 60
End: 2025-01-15
Attending: EMERGENCY MEDICINE
Payer: MEDICAID

## 2025-01-15 ENCOUNTER — HOSPITAL ENCOUNTER (OUTPATIENT)
Dept: LAB | Facility: MEDICAL CENTER | Age: 60
End: 2025-01-15
Attending: PHYSICIAN ASSISTANT
Payer: MEDICAID

## 2025-01-15 VITALS
DIASTOLIC BLOOD PRESSURE: 71 MMHG | TEMPERATURE: 97.5 F | RESPIRATION RATE: 16 BRPM | WEIGHT: 236.33 LBS | HEIGHT: 67 IN | OXYGEN SATURATION: 93 % | HEART RATE: 93 BPM | SYSTOLIC BLOOD PRESSURE: 109 MMHG | BODY MASS INDEX: 37.09 KG/M2

## 2025-01-15 DIAGNOSIS — M54.12 CERVICAL RADICULOPATHY: ICD-10-CM

## 2025-01-15 PROCEDURE — 96372 THER/PROPH/DIAG INJ SC/IM: CPT

## 2025-01-15 PROCEDURE — 80053 COMPREHEN METABOLIC PANEL: CPT

## 2025-01-15 PROCEDURE — 99283 EMERGENCY DEPT VISIT LOW MDM: CPT

## 2025-01-15 PROCEDURE — 700111 HCHG RX REV CODE 636 W/ 250 OVERRIDE (IP): Mod: JZ,UD | Performed by: EMERGENCY MEDICINE

## 2025-01-15 PROCEDURE — 36415 COLL VENOUS BLD VENIPUNCTURE: CPT

## 2025-01-15 PROCEDURE — 84153 ASSAY OF PSA TOTAL: CPT

## 2025-01-15 RX ORDER — KETOROLAC TROMETHAMINE 15 MG/ML
15 INJECTION, SOLUTION INTRAMUSCULAR; INTRAVENOUS ONCE
Status: COMPLETED | OUTPATIENT
Start: 2025-01-15 | End: 2025-01-15

## 2025-01-15 RX ORDER — DIAZEPAM 5 MG/1
5 TABLET ORAL EVERY 8 HOURS PRN
Qty: 9 TABLET | Refills: 0 | Status: SHIPPED | OUTPATIENT
Start: 2025-01-15 | End: 2025-01-18

## 2025-01-15 RX ORDER — NAPROXEN 500 MG/1
500 TABLET ORAL 2 TIMES DAILY WITH MEALS
Qty: 20 TABLET | Refills: 0 | Status: SHIPPED | OUTPATIENT
Start: 2025-01-15 | End: 2025-01-22

## 2025-01-15 RX ADMIN — KETOROLAC TROMETHAMINE 15 MG: 15 INJECTION, SOLUTION INTRAMUSCULAR; INTRAVENOUS at 15:52

## 2025-01-15 ASSESSMENT — FIBROSIS 4 INDEX: FIB4 SCORE: 0.93

## 2025-01-15 ASSESSMENT — PAIN DESCRIPTION - PAIN TYPE
TYPE: ACUTE PAIN
TYPE: ACUTE PAIN

## 2025-01-15 NOTE — ED TRIAGE NOTES
"Chief Complaint   Patient presents with    Shoulder Pain     Pt presents with left shoulder pain. Pt was seen here yesterday for similar complaint. Pt received xray, MRI, and ecg yesterday. Pt states he has been unable to to schedule an appointment with the recommended individuals on his AVS. Pt states that the pain has returned       Pt ambulatory to triage. Pt A&Ox4, for above complaint.     Pt to lobby . Pt educated on alerting staff in changes to condition. Pt verbalized understanding.     /64   Pulse 100   Temp 36.4 °C (97.5 °F) (Temporal)   Resp 16   Ht 1.702 m (5' 7\")   Wt 107 kg (236 lb 5.3 oz)   SpO2 95%   BMI 37.02 kg/m²     "

## 2025-01-15 NOTE — ED PROVIDER NOTES
ED Provider Note    CHIEF COMPLAINT  Chief Complaint   Patient presents with    Shoulder Pain     Pt presents with left shoulder pain. Pt was seen here yesterday for similar complaint. Pt received xray, MRI, and ecg yesterday. Pt states he has been unable to to schedule an appointment with the recommended individuals on his AVS. Pt states that the pain has returned       EXTERNAL RECORDS REVIEWED  Urgent department note from yesterday patient was here had MRI which did show some minor foraminal stenosis but no complete neurologic compromise    HPI/ROS  LIMITATION TO HISTORY     OUTSIDE HISTORIAN(S):      Ta Stokes is a 59 y.o. male who presents to the emergency department with left shoulder pain numbness in that area.  Reports that he was here yesterday was given some pain medicine while he was here and was feeling much better however upon discharge was not given pain medications had worsening pain since that time.  No further falls no trauma no fevers chills redness no other acute symptom change or concern.    PAST MEDICAL HISTORY   has a past medical history of Arrhythmia, Cancer (HCC) (2024), Dental disorder, Dyslipidemia, GERD (gastroesophageal reflux disease), Heart burn, High cholesterol, Hypertension, Indigestion, and Snoring.    SURGICAL HISTORY  patient denies any surgical history    FAMILY HISTORY  Family History   Problem Relation Age of Onset    Hypertension Mother     Cancer Maternal Grandmother        SOCIAL HISTORY  Social History     Tobacco Use    Smoking status: Former     Current packs/day: 0.00     Average packs/day: 1 pack/day for 39.0 years (39.0 ttl pk-yrs)     Types: Cigarettes     Start date: 1979     Quit date: 2018     Years since quittin.0    Smokeless tobacco: Never    Tobacco comments:     1ppd   Vaping Use    Vaping status: Never Used   Substance and Sexual Activity    Alcohol use: Not Currently     Comment: once in awhile, 1 beer    Drug use: No    Sexual  "activity: Not Currently     Partners: Female       CURRENT MEDICATIONS  Home Medications       Reviewed by Adin Shepard R.N. (Registered Nurse) on 01/15/25 at 1358  Med List Status: Partial     Medication Last Dose Status   apixaban (ELIQUIS) 5mg Tab  Active   atorvastatin (LIPITOR) 10 MG Tab  Active   bisoprolol (ZEBETA) 5 MG Tab  Active   Calcium Carb-Cholecalciferol (CALCIUM 1000 + D) 1000-20 MG-MCG Tab  Active   cyclobenzaprine (FLEXERIL) 10 mg Tab  Active   Degarelix Acetate (FIRMAGON SC)  Active   Empagliflozin (JARDIANCE) 10 MG Tab tablet  Active   gabapentin (NEURONTIN) 100 MG Cap  Active   leuprolide (LUPRON) 22.5 MG Kit  Active   lisinopril (PRINIVIL) 5 MG Tab  Active   Misc. Devices Misc  Active   syringe MISC 4 mL with degarelix 80 MG SOLR 80 mg  Active   tamsulosin (FLOMAX) 0.4 MG capsule  Active                    ALLERGIES  Allergies   Allergen Reactions    Metformin Diarrhea     diarrhea       PHYSICAL EXAM  VITAL SIGNS: /64   Pulse 100   Temp 36.4 °C (97.5 °F) (Temporal)   Resp 16   Ht 1.702 m (5' 7\")   Wt 107 kg (236 lb 5.3 oz)   SpO2 95%   BMI 37.02 kg/m²      Pulse ox interpretation: I interpret this pulse ox as normal.  Constitutional: Alert and oriented x 3, minimal distress  HEENT: Atraumatic normocephalic, pupils are equal round reactive to light extraocular movements are intact. The nares is clear, external ears are normal, mouth shows moist mucous membranes normal dentition for age  Neck: Supple, no JVD no tracheal deviation  Thorax & Lungs: No respiratory distress  GI: Soft nontender nondistended positive bowel sounds, no peritoneal signs  Skin: Warm dry no acute rash or lesion  Musculoskeletal: Tender to palpation at the left paraspinal modulatory in the lower cervical region into the posterior aspect of the left deltoid.  No step-off in the midline no warmth erythema induration normal strength and sensation in the left hand negative Michael sign left " "hand  Neurologic: Cranial nerves III through XII are grossly intact no sensory deficit no cerebellar dysfunction   Psychiatric: Appropriate affect for situation at this time          RADIOLOGY/PROCEDURES   I have independently interpreted the diagnostic imaging associated with this visit and am waiting the final reading from the radiologist.   My preliminary interpretation is as follows:   MRI from yesterday reviewed which shows stable appearance of minor cervical central canal stenosis and bilateral foraminal stenosis  Radiologist interpretation:  No orders to display       COURSE & MEDICAL DECISION MAKING    ASSESSMENT, COURSE AND PLAN  Care Narrative: Patient given a dose of Toradol here.  Feeling better.  Given prescription for naproxen and Valium.  Given instructions to follow-up with neurosurgery at the next available time.  Return here for worsening pain numbness tingling weakness any other acute symptom change or concern otherwise discharged in stable and improved condition.     ADDITIONAL PROBLEMS MANAGED    DISPOSITION AND DISCUSSIONS    I have discussed management of the patient with the following physicians and ADAM's:      Discussion of management with other QHP or appropriate source(s):      Escalation of care considered, and ultimately not performed:    Barriers to care at this time, including but not limited to: .     Decision tools and prescription drugs considered including, but not limited to: .  /71   Pulse 93   Temp 36.4 °C (97.5 °F) (Temporal)   Resp 16   Ht 1.702 m (5' 7\")   Wt 107 kg (236 lb 5.3 oz)   SpO2 93%   BMI 37.02 kg/m²     Calvin Mathis M.D.  [address] [facility] [address] [phone]    Schedule an appointment as soon as possible for a visit       [facility], Emergency Dept  [address] [facility] [address] [phone]    in 12-24 hours if symptoms persist, immediately If symptoms worsen, or if you develop any other symptoms or " concerns      FINAL DIAGNOSIS  1. Cervical radiculopathy         Electronically signed by: Stan Orta M.D.

## 2025-01-16 LAB
ALBUMIN SERPL BCP-MCNC: 4.2 G/DL (ref 3.2–4.9)
ALBUMIN/GLOB SERPL: 1.9 G/DL
ALP SERPL-CCNC: 80 U/L (ref 30–99)
ALT SERPL-CCNC: 26 U/L (ref 2–50)
ANION GAP SERPL CALC-SCNC: 13 MMOL/L (ref 7–16)
AST SERPL-CCNC: 17 U/L (ref 12–45)
BILIRUB SERPL-MCNC: <0.2 MG/DL (ref 0.1–1.5)
BUN SERPL-MCNC: 25 MG/DL (ref 8–22)
CALCIUM ALBUM COR SERPL-MCNC: 9.4 MG/DL (ref 8.5–10.5)
CALCIUM SERPL-MCNC: 9.6 MG/DL (ref 8.5–10.5)
CHLORIDE SERPL-SCNC: 108 MMOL/L (ref 96–112)
CO2 SERPL-SCNC: 19 MMOL/L (ref 20–33)
CREAT SERPL-MCNC: 0.98 MG/DL (ref 0.5–1.4)
GFR SERPLBLD CREATININE-BSD FMLA CKD-EPI: 89 ML/MIN/1.73 M 2
GLOBULIN SER CALC-MCNC: 2.2 G/DL (ref 1.9–3.5)
GLUCOSE SERPL-MCNC: 225 MG/DL (ref 65–99)
POTASSIUM SERPL-SCNC: 4.6 MMOL/L (ref 3.6–5.5)
PROT SERPL-MCNC: 6.4 G/DL (ref 6–8.2)
PSA SERPL DL<=0.01 NG/ML-MCNC: 0.14 NG/ML (ref 0–4)
SODIUM SERPL-SCNC: 140 MMOL/L (ref 135–145)

## 2025-01-16 NOTE — ED NOTES
Pt discharged to home. Pt was given follow up instructions and prescriptions for Valium and Naproxen. Pt verbalized understanding of all instructions for discharge and is ambulatory out of ED with steady gait. AOx4

## 2025-01-22 ENCOUNTER — OFFICE VISIT (OUTPATIENT)
Dept: MEDICAL GROUP | Facility: MEDICAL CENTER | Age: 60
End: 2025-01-22
Attending: FAMILY MEDICINE
Payer: MEDICAID

## 2025-01-22 VITALS
DIASTOLIC BLOOD PRESSURE: 70 MMHG | RESPIRATION RATE: 16 BRPM | HEART RATE: 107 BPM | WEIGHT: 244.8 LBS | TEMPERATURE: 96.8 F | BODY MASS INDEX: 38.42 KG/M2 | SYSTOLIC BLOOD PRESSURE: 100 MMHG | HEIGHT: 67 IN | OXYGEN SATURATION: 96 %

## 2025-01-22 DIAGNOSIS — E11.39 TYPE 2 DIABETES MELLITUS WITH OTHER OPHTHALMIC COMPLICATION, WITHOUT LONG-TERM CURRENT USE OF INSULIN (HCC): ICD-10-CM

## 2025-01-22 DIAGNOSIS — M54.12 SPINAL STENOSIS OF CERVICAL REGION WITH RADICULOPATHY: ICD-10-CM

## 2025-01-22 DIAGNOSIS — C61 CARCINOMA OF PROSTATE (HCC): Chronic | ICD-10-CM

## 2025-01-22 DIAGNOSIS — I48.19 PERSISTENT ATRIAL FIBRILLATION (HCC): ICD-10-CM

## 2025-01-22 DIAGNOSIS — M48.02 SPINAL STENOSIS OF CERVICAL REGION WITH RADICULOPATHY: ICD-10-CM

## 2025-01-22 DIAGNOSIS — E78.5 DYSLIPIDEMIA: Chronic | ICD-10-CM

## 2025-01-22 DIAGNOSIS — M54.50 CHRONIC BILATERAL LOW BACK PAIN, UNSPECIFIED WHETHER SCIATICA PRESENT: ICD-10-CM

## 2025-01-22 DIAGNOSIS — G89.29 CHRONIC BILATERAL LOW BACK PAIN, UNSPECIFIED WHETHER SCIATICA PRESENT: ICD-10-CM

## 2025-01-22 LAB
HBA1C MFR BLD: 6.6 % (ref ?–5.8)
POCT INT CON NEG: NEGATIVE
POCT INT CON POS: POSITIVE

## 2025-01-22 PROCEDURE — 3074F SYST BP LT 130 MM HG: CPT | Performed by: FAMILY MEDICINE

## 2025-01-22 PROCEDURE — 83036 HEMOGLOBIN GLYCOSYLATED A1C: CPT | Performed by: FAMILY MEDICINE

## 2025-01-22 PROCEDURE — 99214 OFFICE O/P EST MOD 30 MIN: CPT | Performed by: FAMILY MEDICINE

## 2025-01-22 PROCEDURE — 99213 OFFICE O/P EST LOW 20 MIN: CPT | Performed by: FAMILY MEDICINE

## 2025-01-22 PROCEDURE — 3078F DIAST BP <80 MM HG: CPT | Performed by: FAMILY MEDICINE

## 2025-01-22 RX ORDER — DICLOFENAC SODIUM 75 MG/1
75 TABLET, DELAYED RELEASE ORAL 2 TIMES DAILY
Qty: 60 TABLET | Refills: 2 | Status: SHIPPED | OUTPATIENT
Start: 2025-01-22

## 2025-01-22 ASSESSMENT — FIBROSIS 4 INDEX: FIB4 SCORE: 0.9

## 2025-01-22 NOTE — Clinical Note
Please see note from Dr. Mathis visit on 1/21/25 ( I will recommend for [facility] to perform a cervical epidural steroid injection to see if this helps with his left-sided C5-C6 radicular pain 3.  Because there is overlapping symptoms with the C5-C6 distribution as well as carpal tunnel and possible cubital tunnel symptoms, I recommend an EMG to differentiate if treating carpal tunnel or cubital tunnel versus cervical radiculopathy should be prioritized)   Insurance denial appears to be due to lack of notation that he was not recommended wrist brace. Copy of denial to be scanned into chart. Asked pt to schedule follow up with you asap given progressive worsening of symptoms.   Appreciate you help with this patient!  Take care, [facility] Physician [facility]
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
## 2025-02-03 ENCOUNTER — APPOINTMENT (OUTPATIENT)
Dept: PHYSICAL MEDICINE AND REHAB | Facility: MEDICAL CENTER | Age: 60
End: 2025-02-03
Payer: MEDICAID

## 2025-02-03 VITALS
TEMPERATURE: 97.3 F | HEART RATE: 101 BPM | BODY MASS INDEX: 38.3 KG/M2 | WEIGHT: 244 LBS | SYSTOLIC BLOOD PRESSURE: 140 MMHG | DIASTOLIC BLOOD PRESSURE: 70 MMHG | OXYGEN SATURATION: 95 % | HEIGHT: 67 IN

## 2025-02-03 DIAGNOSIS — M47.812 SPONDYLOSIS OF CERVICAL SPINE: ICD-10-CM

## 2025-02-03 DIAGNOSIS — M48.02 FORAMINAL STENOSIS OF CERVICAL REGION: ICD-10-CM

## 2025-02-03 DIAGNOSIS — G89.29 CHRONIC NECK PAIN: ICD-10-CM

## 2025-02-03 DIAGNOSIS — G56.03 BILATERAL CARPAL TUNNEL SYNDROME: ICD-10-CM

## 2025-02-03 DIAGNOSIS — M54.2 CHRONIC NECK PAIN: ICD-10-CM

## 2025-02-03 DIAGNOSIS — M47.22 CERVICAL RADICULOPATHY DUE TO DEGENERATIVE JOINT DISEASE OF SPINE: ICD-10-CM

## 2025-02-03 DIAGNOSIS — R20.0 BILATERAL HAND NUMBNESS: ICD-10-CM

## 2025-02-03 PROCEDURE — 95885 MUSC TST DONE W/NERV TST LIM: CPT | Mod: RT,59 | Performed by: STUDENT IN AN ORGANIZED HEALTH CARE EDUCATION/TRAINING PROGRAM

## 2025-02-03 PROCEDURE — 95886 MUSC TEST DONE W/N TEST COMP: CPT | Mod: LT | Performed by: STUDENT IN AN ORGANIZED HEALTH CARE EDUCATION/TRAINING PROGRAM

## 2025-02-03 PROCEDURE — 3077F SYST BP >= 140 MM HG: CPT | Performed by: STUDENT IN AN ORGANIZED HEALTH CARE EDUCATION/TRAINING PROGRAM

## 2025-02-03 PROCEDURE — 99214 OFFICE O/P EST MOD 30 MIN: CPT | Mod: 25 | Performed by: STUDENT IN AN ORGANIZED HEALTH CARE EDUCATION/TRAINING PROGRAM

## 2025-02-03 PROCEDURE — 3078F DIAST BP <80 MM HG: CPT | Performed by: STUDENT IN AN ORGANIZED HEALTH CARE EDUCATION/TRAINING PROGRAM

## 2025-02-03 PROCEDURE — 95911 NRV CNDJ TEST 9-10 STUDIES: CPT | Performed by: STUDENT IN AN ORGANIZED HEALTH CARE EDUCATION/TRAINING PROGRAM

## 2025-02-03 ASSESSMENT — PAIN SCALES - GENERAL: PAINLEVEL_OUTOF10: 8=MODERATE-SEVERE PAIN

## 2025-02-03 ASSESSMENT — FIBROSIS 4 INDEX: FIB4 SCORE: 0.9

## 2025-02-04 ENCOUNTER — TELEPHONE (OUTPATIENT)
Dept: CARDIOLOGY | Facility: MEDICAL CENTER | Age: 60
End: 2025-02-04
Payer: MEDICAID

## 2025-02-04 NOTE — LETTER
PROCEDURE/SURGERY CLEARANCE FORM      Encounter Date: 2/4/2025    Patient: Ta Stokes  YOB: 1965    CARDIOLOGIST: Tyler Brower M.D.   REFERRING DOCTOR:  No ref. provider found    The following procedure/surgery: Cervical C7-T1 interlaminar epidural steroid injection( left), with Dr. Satya Ponce                                            Additional comments:  He can proceed with the proposed procedure or surgery from a cardiac standpoint, no modifiable cardiovascular risk, no further cardiac testing required, hold anticoagulation as necessary, resume as soon as possible typically when patient is able to take oral medications.     It is my pleasure to participate in the care of Mr. Stokes.  Please do not hesitate to contact me with questions or concerns. [facility] is available 24/7 for consultative services at [phone] in the perioperative period.     Electronically Signed     Tyler Brower MD PhD FAC  Cardiologist [facility]

## 2025-02-04 NOTE — PROCEDURES
"Electromyography Report          Name: Ta Stokes    MRN: [id number]   YOB: 1965 (59 y.o.)   Sex: male   Vitals:  Vitals:    02/03/25 1244   BP: (!) 140/70   Weight: 111 kg (244 lb)   Height: 1.702 m (5' 7\")     Body mass index is 38.22 kg/m².    Examining Physician: Mark Whitley D.O.     Visit Diagnoses     ICD-10-CM   1. Spondylosis of cervical spine  M47.812   2. Chronic neck pain  M54.2    G89.29   3. Cervical radiculopathy due to degenerative joint disease of spine  M47.22   4. Foraminal stenosis of cervical region  M48.02   5. Bilateral carpal tunnel syndrome  G56.03   6. Bilateral hand numbness  R20.0       EMG Examination Date: 2/3/2025     Impression:      This is abnormal study  There is electrodiagnostic evidence for an acute and chronic left C5-C6 radiculopathy  There is electrodiagnostic evidence for bilateral median mononeuropathy at the wrist, i.e. carpal tunnel syndrome, that is moderate on the right and mild on the left.  There is potential concern for a left ulnar mononeuropathy at the elbow i.e. cubital tunnel syndrome however this may have been technical error as there were bilateral below elbow stimulations showed minimal response.  Can consider a repeat EMG in 3 months to further evaluate this finding    Recommendations: consider repeat EMG/NCS testing in 3-6 months for repeat evaluation focusing on bilateral ulnar nerves    History: Patient is a 6-year-old male who presents for bilateral upper extremity EMG.  Patient notes worsening bilateral hand pain and numbness over the past year.  He also notes concomitant predominantly left-sided neck pain.  Previous MRI of the cervical spine showed severe foraminal stenosis at right C4-C5 as well as moderate to severe foraminal stenosis of bilateral C5-C6.    Relevant Medical Hx:  Thyroid disease  negative  Cancer   negative    Diabetes  negative  Autoimmune disease negative    Physical exam:    GEN: No acute distress, well nourished, " well developed  HEENT: Normal cephalic, PERRLA, EOMI  EXT: No clubbing cyanosis or edema  MUSCULOSKELETAL:   Cervical spine   Inspection: No deformities of the skin over the cervical spine. No rashes or lesions.  full  A/P ROM in all directions, with  pain    Spurling’s sign: positive left  No signs of muscular atrophy in bilateral upper extremities   No tenderness to palpation of the cervical facets     Neuro   Key points for the international standards for neurological classification of spinal cord injury (ISNCSCI) to light touch.   Dermatome R L   C4 2 2   C5 2 2   C6 1 1   C7 1 1   C8 1 1   T1 2 2      Motor Exam Upper Extremities   ? Myotome R L   Shoulder flexion C5 5 5   Elbow flexion C5 5 5   Wrist extension C6 4 4   Elbow extension C7 4 4   Finger flexion C8 5 5   Finger abduction T1 5 5      Reflexes  ?   R L   Biceps   2+ 2+   Brachioradialis   2+ 2+     Positive tinels testing bilaterally  Positive CTS compression bilaterally       Nerve Conduction Studies and Electromyography  Examination Findings:                     Nerve conduction studies (NCS) and electromyography (EMG) are utilized to evaluate direct or indirect damage to the peripheral nervous system. NCS are performed to measure the nerve(s) response(s) to electrostimulation across a given nerve segment. EMG evaluates the passive and active electrical activity of the muscle(s) in question.  Muscles are innervated by specific peripheral nerves and roots. Often times, several nerves the muscle to be examined in order to determine the presence or absence of the disease process. Furthermore, nerves and muscles may need to be tested in a [id number] comparison, as well as in additional extremities, as this may be crucial in characterizing the extent of the disease process, which may be diffuse or isolated and of varying degree of severity. The extent of the neurodiagnostic exam is justified as it may help arrive to a proper diagnosis, which  ultimately may contribute to better management of the patient. Therefore, the nerves to muscles examined during the study were medically necessary.    Unless otherwise noted, temperature of the extremity(s) study was monitored before and during the examination and remained between 32 and 36 degrees C for the upper extremities, and between 30 and 36 degrees C for the lower extremities. The patient tolerated testing well, without any complications. The study was done with a monopolar needle examination.   Reference values are from the [facility] normative data guidelines and Fatimah related to age guidelines.     cpt 03689. Nerve conduction studies, 11-12 studies  CPT 75504. needle electromyography, complete, each extremity.     Mark Whitley D.O.

## 2025-02-26 ENCOUNTER — HOSPITAL ENCOUNTER (OUTPATIENT)
Facility: REHABILITATION | Age: 60
End: 2025-02-26
Attending: PHYSICAL MEDICINE & REHABILITATION | Admitting: PHYSICAL MEDICINE & REHABILITATION
Payer: MEDICAID

## 2025-02-26 ENCOUNTER — APPOINTMENT (OUTPATIENT)
Dept: RADIOLOGY | Facility: REHABILITATION | Age: 60
End: 2025-02-26
Attending: PHYSICAL MEDICINE & REHABILITATION
Payer: MEDICAID

## 2025-02-26 VITALS
BODY MASS INDEX: 38.2 KG/M2 | HEIGHT: 67 IN | WEIGHT: 243.39 LBS | DIASTOLIC BLOOD PRESSURE: 75 MMHG | OXYGEN SATURATION: 91 % | SYSTOLIC BLOOD PRESSURE: 111 MMHG | RESPIRATION RATE: 16 BRPM | HEART RATE: 96 BPM | TEMPERATURE: 97.2 F

## 2025-02-26 LAB — GLUCOSE BLD STRIP.AUTO-MCNC: 156 MG/DL (ref 65–99)

## 2025-02-26 PROCEDURE — 82962 GLUCOSE BLOOD TEST: CPT

## 2025-02-26 ASSESSMENT — PAIN DESCRIPTION - PAIN TYPE: TYPE: CHRONIC PAIN

## 2025-02-26 ASSESSMENT — FIBROSIS 4 INDEX: FIB4 SCORE: 0.9

## 2025-02-26 NOTE — PROGRESS NOTES
The patient was originally scheduled for a cervical C7-T1 interlaminar epidural steroid injection on the left side.  However the patient's pain improved with conservative treatments with medication management stretching and exercises.  He continues to have numbness in his hands however this thought to be secondary to carpal tunnel syndrome rather than a cervical radiculopathy.  Because of this we decided to cancel today's procedure and he will follow-up with Dr. Whitley in clinic as scheduled

## 2025-02-26 NOTE — PROGRESS NOTES
Blood sugar taken by RN at 1030, results 156.    1100 Dr Ponce and Dr Whitley chairside with pt discussing pain level.  Procedure canceled for today.

## 2025-03-05 ENCOUNTER — HOSPITAL ENCOUNTER (OUTPATIENT)
Dept: LAB | Facility: MEDICAL CENTER | Age: 60
End: 2025-03-05
Attending: UROLOGY
Payer: MEDICAID

## 2025-03-05 LAB
ALBUMIN SERPL BCP-MCNC: 3.9 G/DL (ref 3.2–4.9)
ALBUMIN/GLOB SERPL: 1.7 G/DL
ALP SERPL-CCNC: 74 U/L (ref 30–99)
ALT SERPL-CCNC: 38 U/L (ref 2–50)
ANION GAP SERPL CALC-SCNC: 10 MMOL/L (ref 7–16)
AST SERPL-CCNC: 26 U/L (ref 12–45)
BILIRUB SERPL-MCNC: 0.3 MG/DL (ref 0.1–1.5)
BUN SERPL-MCNC: 16 MG/DL (ref 8–22)
CALCIUM ALBUM COR SERPL-MCNC: 10.1 MG/DL (ref 8.5–10.5)
CALCIUM SERPL-MCNC: 10 MG/DL (ref 8.5–10.5)
CHLORIDE SERPL-SCNC: 110 MMOL/L (ref 96–112)
CO2 SERPL-SCNC: 21 MMOL/L (ref 20–33)
CREAT SERPL-MCNC: 0.91 MG/DL (ref 0.5–1.4)
GFR SERPLBLD CREATININE-BSD FMLA CKD-EPI: 97 ML/MIN/1.73 M 2
GLOBULIN SER CALC-MCNC: 2.3 G/DL (ref 1.9–3.5)
GLUCOSE SERPL-MCNC: 121 MG/DL (ref 65–99)
POTASSIUM SERPL-SCNC: 4.9 MMOL/L (ref 3.6–5.5)
PROT SERPL-MCNC: 6.2 G/DL (ref 6–8.2)
PSA SERPL DL<=0.01 NG/ML-MCNC: 0.14 NG/ML (ref 0–4)
SODIUM SERPL-SCNC: 141 MMOL/L (ref 135–145)
TESTOST SERPL-MCNC: <3 NG/DL (ref 175–781)

## 2025-03-05 PROCEDURE — 84153 ASSAY OF PSA TOTAL: CPT

## 2025-03-05 PROCEDURE — 84403 ASSAY OF TOTAL TESTOSTERONE: CPT

## 2025-03-05 PROCEDURE — 80053 COMPREHEN METABOLIC PANEL: CPT

## 2025-03-05 PROCEDURE — 36415 COLL VENOUS BLD VENIPUNCTURE: CPT

## 2025-03-12 ENCOUNTER — APPOINTMENT (OUTPATIENT)
Dept: PHYSICAL MEDICINE AND REHAB | Facility: MEDICAL CENTER | Age: 60
End: 2025-03-12
Payer: MEDICAID

## 2025-03-12 VITALS
TEMPERATURE: 97.4 F | HEIGHT: 67 IN | WEIGHT: 243 LBS | OXYGEN SATURATION: 95 % | HEART RATE: 96 BPM | SYSTOLIC BLOOD PRESSURE: 128 MMHG | DIASTOLIC BLOOD PRESSURE: 82 MMHG | BODY MASS INDEX: 38.14 KG/M2

## 2025-03-12 DIAGNOSIS — M54.2 CHRONIC NECK PAIN: ICD-10-CM

## 2025-03-12 DIAGNOSIS — G89.29 CHRONIC NECK PAIN: ICD-10-CM

## 2025-03-12 DIAGNOSIS — M54.41 CHRONIC BILATERAL LOW BACK PAIN WITH RIGHT-SIDED SCIATICA: ICD-10-CM

## 2025-03-12 DIAGNOSIS — M47.812 SPONDYLOSIS OF CERVICAL SPINE: ICD-10-CM

## 2025-03-12 DIAGNOSIS — G56.03 BILATERAL CARPAL TUNNEL SYNDROME: ICD-10-CM

## 2025-03-12 DIAGNOSIS — M48.02 FORAMINAL STENOSIS OF CERVICAL REGION: ICD-10-CM

## 2025-03-12 DIAGNOSIS — G89.29 CHRONIC BILATERAL LOW BACK PAIN WITH RIGHT-SIDED SCIATICA: ICD-10-CM

## 2025-03-12 PROCEDURE — 3079F DIAST BP 80-89 MM HG: CPT | Performed by: STUDENT IN AN ORGANIZED HEALTH CARE EDUCATION/TRAINING PROGRAM

## 2025-03-12 PROCEDURE — 3074F SYST BP LT 130 MM HG: CPT | Performed by: STUDENT IN AN ORGANIZED HEALTH CARE EDUCATION/TRAINING PROGRAM

## 2025-03-12 PROCEDURE — 99213 OFFICE O/P EST LOW 20 MIN: CPT | Performed by: STUDENT IN AN ORGANIZED HEALTH CARE EDUCATION/TRAINING PROGRAM

## 2025-03-12 ASSESSMENT — PATIENT HEALTH QUESTIONNAIRE - PHQ9
5. POOR APPETITE OR OVEREATING: 1 - SEVERAL DAYS
SUM OF ALL RESPONSES TO PHQ QUESTIONS 1-9: 12
CLINICAL INTERPRETATION OF PHQ2 SCORE: 4

## 2025-03-12 ASSESSMENT — PAIN SCALES - GENERAL: PAINLEVEL_OUTOF10: 6=MODERATE PAIN

## 2025-03-12 ASSESSMENT — FIBROSIS 4 INDEX: FIB4 SCORE: 1.14
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
## 2025-03-19 ENCOUNTER — HOSPITAL ENCOUNTER (OUTPATIENT)
Dept: RADIOLOGY | Facility: MEDICAL CENTER | Age: 60
End: 2025-03-19
Attending: STUDENT IN AN ORGANIZED HEALTH CARE EDUCATION/TRAINING PROGRAM
Payer: MEDICAID

## 2025-03-19 DIAGNOSIS — M54.41 CHRONIC BILATERAL LOW BACK PAIN WITH RIGHT-SIDED SCIATICA: ICD-10-CM

## 2025-03-19 DIAGNOSIS — G89.29 CHRONIC BILATERAL LOW BACK PAIN WITH RIGHT-SIDED SCIATICA: ICD-10-CM

## 2025-03-19 PROCEDURE — 72110 X-RAY EXAM L-2 SPINE 4/>VWS: CPT

## 2025-04-09 ENCOUNTER — APPOINTMENT (OUTPATIENT)
Dept: RADIOLOGY | Facility: MEDICAL CENTER | Age: 60
End: 2025-04-09
Attending: STUDENT IN AN ORGANIZED HEALTH CARE EDUCATION/TRAINING PROGRAM
Payer: MEDICAID

## 2025-04-09 DIAGNOSIS — G89.29 CHRONIC BILATERAL LOW BACK PAIN WITH RIGHT-SIDED SCIATICA: ICD-10-CM

## 2025-04-09 DIAGNOSIS — M54.41 CHRONIC BILATERAL LOW BACK PAIN WITH RIGHT-SIDED SCIATICA: ICD-10-CM

## 2025-04-09 PROCEDURE — 72148 MRI LUMBAR SPINE W/O DYE: CPT

## 2025-04-10 ENCOUNTER — OFFICE VISIT (OUTPATIENT)
Dept: PHYSICAL MEDICINE AND REHAB | Facility: MEDICAL CENTER | Age: 60
End: 2025-04-10
Payer: MEDICAID

## 2025-04-10 VITALS
BODY MASS INDEX: 38.14 KG/M2 | RESPIRATION RATE: 16 BRPM | DIASTOLIC BLOOD PRESSURE: 76 MMHG | OXYGEN SATURATION: 97 % | HEART RATE: 92 BPM | WEIGHT: 243 LBS | TEMPERATURE: 97.2 F | SYSTOLIC BLOOD PRESSURE: 111 MMHG | HEIGHT: 67 IN

## 2025-04-10 DIAGNOSIS — G56.03 BILATERAL CARPAL TUNNEL SYNDROME: ICD-10-CM

## 2025-04-10 DIAGNOSIS — M54.2 CHRONIC NECK PAIN: ICD-10-CM

## 2025-04-10 DIAGNOSIS — M48.02 FORAMINAL STENOSIS OF CERVICAL REGION: ICD-10-CM

## 2025-04-10 DIAGNOSIS — G89.29 CHRONIC NECK PAIN: ICD-10-CM

## 2025-04-10 DIAGNOSIS — M47.816 SPONDYLOSIS OF LUMBAR SPINE: ICD-10-CM

## 2025-04-10 DIAGNOSIS — M47.812 SPONDYLOSIS OF CERVICAL SPINE: ICD-10-CM

## 2025-04-10 PROCEDURE — 3078F DIAST BP <80 MM HG: CPT | Performed by: STUDENT IN AN ORGANIZED HEALTH CARE EDUCATION/TRAINING PROGRAM

## 2025-04-10 PROCEDURE — 1125F AMNT PAIN NOTED PAIN PRSNT: CPT | Performed by: STUDENT IN AN ORGANIZED HEALTH CARE EDUCATION/TRAINING PROGRAM

## 2025-04-10 PROCEDURE — 99213 OFFICE O/P EST LOW 20 MIN: CPT | Performed by: STUDENT IN AN ORGANIZED HEALTH CARE EDUCATION/TRAINING PROGRAM

## 2025-04-10 PROCEDURE — 3074F SYST BP LT 130 MM HG: CPT | Performed by: STUDENT IN AN ORGANIZED HEALTH CARE EDUCATION/TRAINING PROGRAM

## 2025-04-10 ASSESSMENT — PAIN SCALES - GENERAL: PAINLEVEL_OUTOF10: 6=MODERATE PAIN

## 2025-04-10 ASSESSMENT — PATIENT HEALTH QUESTIONNAIRE - PHQ9
5. POOR APPETITE OR OVEREATING: 2 - MORE THAN HALF THE DAYS
CLINICAL INTERPRETATION OF PHQ2 SCORE: 2
SUM OF ALL RESPONSES TO PHQ QUESTIONS 1-9: 11

## 2025-04-10 ASSESSMENT — FIBROSIS 4 INDEX: FIB4 SCORE: 1.16

## 2025-04-10 NOTE — H&P (VIEW-ONLY)
[facility] (Physical Medicine and Rehabilitation)  Sports Medicine& Interventional Spine   Follow Up Patient Visit      Chief complaint:   Chief Complaint   Patient presents with    Follow-Up     Image review          HISTORY        HPI  Patient identification: Ta Stokes ,  1965,   With Diagnoses of Spondylosis of cervical spine, Chronic neck pain, Foraminal stenosis of cervical region, Bilateral carpal tunnel syndrome, and Spondylosis of lumbar spine were pertinent to this visit.       At last visit dated 3/13/25  Ta Stokes  1965 male presents today for repeat evaluation.  Previous workup has included bilateral upper extremity EMG which did show concern for left-sided radiculopathy as well as bilateral carpal tunnel syndrome.Repeat evaluation patient reports improvement in his symptoms of bilateral hands as well as improvement in symptoms of the left neck and radiation of pain into the left upper extremity.  He was previously recommended to undergo a cervical interlaminar epidural steroid injection, however this was canceled due to patient being almost completely asymptomatic.  His primary complaint today is related to continued low back pain that is predominantly axial in nature.  On examination patient however did have mild weakness with hip flexion knee extension and ankle dorsiflexion potentially concerning for radiculopathy of the right side.  An MRI of the lumbar spine has been ordered for further evaluation.  Recommend patient follow-up after this is completed to discuss continued management and treatment options.         Previous Workup:  EMG Examination Date: 2/3/2025      Impression:       This is abnormal study  There is electrodiagnostic evidence for an acute and chronic left C5-C6 radiculopathy  There is electrodiagnostic evidence for bilateral median mononeuropathy at the wrist, i.e. carpal tunnel syndrome, that is moderate on the right and mild on the  left.  There is potential concern for a left ulnar mononeuropathy at the elbow i.e. cubital tunnel syndrome however this may have been technical error as there were bilateral below elbow stimulations showed minimal response.  Can consider a repeat EMG in 3 months to further evaluate this finding    Interval History:  History of Present Illness  The patient is a 60-year-old male who presents today for evaluation of chronic low back pain and bilateral carpal tunnel syndrome.    Last visit, MRI of the lumbar spine was ordered for further evaluation of chronic low back pain. Pain today is reported to be 5-6 out of 10 in severity. He notes pain is worse from last visit and significant cramping of the low back. He reports experiencing bilateral lower back pain, which he describes as being around the kidney area. Despite previous evaluations indicating normal kidney function, he continues to experience this discomfort. Additionally, he describes a sensation akin to stretching on the left side of his back, slightly off-center from the spine. His pain does not radiate into his lower extremities. He also reports difficulty in performing simple tasks such as tying his shoes due to the pain. He expresses concern about the potential spread of his back pain. He has noticed a change in his gait, with a tendency to shuffle his feet more than before. He also reports difficulty in transitioning from hardwood to carpeted floors, often catching his feet on the edge. He experiences severe back stiffness after short walks, necessitating brief periods of rest to alleviate the discomfort. The pain is described as muscular or tendinous rather than osseous.    He has been using wrist splints for carpal tunnel syndrome, which initially provided relief but have since become less effective. He wears them every night. He experiences numbness in his hands during sleep, which disrupts his rest. He also reports symptoms of burning, itching,  tingling, and a pins-and-needles sensation when not wearing the splints. He was previously referred to the emergency room for an injection but did not receive it due to resolution of his shoulder pain. He was given the splint and referred back to this provider. He is uncertain about the condition of his braces and whether they are still providing adequate support.    FAMILY HISTORY  His mother had a lot of back issues and had wires and a battery implanted in her back.            PMHx:   Past Medical History:   Diagnosis Date    Arrhythmia     A-fib    Cancer (HCC) 01/2024    prostate cancer    Dental disorder     upper/lower dentures    Dyslipidemia     GERD (gastroesophageal reflux disease)     Heart burn     High cholesterol     Hypertension     Indigestion     Snoring        PSHx:   Past Surgical History:   Procedure Laterality Date    EMG  2/3/2025       Family history   Family History   Problem Relation Age of Onset    Hypertension Mother     Cancer Maternal Grandmother          Medications:   Outpatient Medications Marked as Taking for the 4/10/25 encounter (Office Visit) with Mark Whitley D.O.   Medication Sig Dispense Refill    diclofenac DR (VOLTAREN) 75 MG Tablet Delayed Response Take 1 Tablet by mouth 2 times a day. 60 Tablet 2    gabapentin (NEURONTIN) 100 MG Cap Take 1-3 Capsules by mouth at bedtime as needed (pain). 90 Capsule 3    bisoprolol (ZEBETA) 5 MG Tab Take 1 Tablet by mouth every day. 90 Tablet 3    Degarelix Acetate (FIRMAGON SC)       Empagliflozin (JARDIANCE) 10 MG Tab tablet Take 1 Tablet by mouth every day. 90 Tablet 3    atorvastatin (LIPITOR) 10 MG Tab Take 1 Tablet by mouth every evening. 90 Tablet 3    lisinopril (PRINIVIL) 5 MG Tab Take 1 tablet by mouth once daily 90 Tablet 3    apixaban (ELIQUIS) 5mg Tab Take 1 Tablet by mouth 2 times a day. 60 Tablet 11    tamsulosin (FLOMAX) 0.4 MG capsule Take 1 Capsule by mouth at bedtime.      Calcium Carb-Cholecalciferol (CALCIUM 1000 + D)  1000-20 MG-MCG Tab Take  by mouth.          Current Outpatient Medications on File Prior to Visit   Medication Sig Dispense Refill    diclofenac DR (VOLTAREN) 75 MG Tablet Delayed Response Take 1 Tablet by mouth 2 times a day. 60 Tablet 2    gabapentin (NEURONTIN) 100 MG Cap Take 1-3 Capsules by mouth at bedtime as needed (pain). 90 Capsule 3    bisoprolol (ZEBETA) 5 MG Tab Take 1 Tablet by mouth every day. 90 Tablet 3    Degarelix Acetate (FIRMAGON SC)       Empagliflozin (JARDIANCE) 10 MG Tab tablet Take 1 Tablet by mouth every day. 90 Tablet 3    atorvastatin (LIPITOR) 10 MG Tab Take 1 Tablet by mouth every evening. 90 Tablet 3    lisinopril (PRINIVIL) 5 MG Tab Take 1 tablet by mouth once daily 90 Tablet 3    apixaban (ELIQUIS) 5mg Tab Take 1 Tablet by mouth 2 times a day. 60 Tablet 11    tamsulosin (FLOMAX) 0.4 MG capsule Take 1 Capsule by mouth at bedtime.      Calcium Carb-Cholecalciferol (CALCIUM 1000 + D) 1000-20 MG-MCG Tab Take  by mouth.      methylPREDNISolone (MEDROL DOSEPAK) 4 MG Tablet Therapy Pack Follow schedule on package instructions. (Patient not taking: Reported on 4/10/2025) 21 Tablet 0    Misc. Devices Misc Overnight oximetry, O2 concentrator to keep nocturnal O2 sats > 92% (Patient not taking: Reported on 12/12/2024) 1 Device 0     No current facility-administered medications on file prior to visit.         Allergies:   Allergies   Allergen Reactions    Metformin Diarrhea     diarrhea       Social Hx:   Social History     Socioeconomic History    Marital status: Single     Spouse name: Not on file    Number of children: Not on file    Years of education: Not on file    Highest education level: Associate degree: occupational, technical, or vocational program   Occupational History    Not on file   Tobacco Use    Smoking status: Former     Current packs/day: 0.00     Average packs/day: 1 pack/day for 39.0 years (39.0 ttl pk-yrs)     Types: Cigarettes     Start date: 1/1/1979     Quit date:  2018     Years since quittin.2    Smokeless tobacco: Never    Tobacco comments:     1ppd   Vaping Use    Vaping status: Never Used   Substance and Sexual Activity    Alcohol use: Not Currently     Comment: once in awhile, 1 beer    Drug use: No    Sexual activity: Not Currently     Partners: Female   Other Topics Concern    Not on file   Social History Narrative    Not on file     Social Drivers of Health     Financial Resource Strain: High Risk (3/22/2023)    Overall Financial Resource Strain (CARDIA)     Difficulty of Paying Living Expenses: Hard   Food Insecurity: Food Insecurity Present (3/22/2023)    Hunger Vital Sign     Worried About Running Out of Food in the Last Year: Sometimes true     Ran Out of Food in the Last Year: Never true   Transportation Needs: No Transportation Needs (3/22/2023)    PRAPARE - Transportation     Lack of Transportation (Medical): No     Lack of Transportation (Non-Medical): No   Physical Activity: Sufficiently Active (3/22/2023)    Exercise Vital Sign     Days of Exercise per Week: 7 days     Minutes of Exercise per Session: 150+ min   Stress: Stress Concern Present (3/22/2023)    [place] [employer] - Occupational Stress Questionnaire     Feeling of Stress : To some extent   Social Connections: Moderately Isolated (3/22/2023)    Social Connection and Isolation Panel [NHANES]     Frequency of Communication with Friends and Family: More than three times a week     Frequency of Social Gatherings with Friends and Family: More than three times a week     Attends [employer] Services: Never     Active Member of Clubs or Organizations: No     Attends Club or Organization Meetings: Never     Marital Status: Living with partner   Intimate Partner Violence: Not on file   Housing Stability: Low Risk  (3/22/2023)    Housing Stability Vital Sign     Unable to Pay for Housing in the Last Year: No     Number of Places Lived in the Last Year: 1     Unstable Housing in  "the Last Year: No         EXAMINATION     Physical Exam:   Vitals: /76 (BP Location: Right arm, Patient Position: Sitting, BP Cuff Size: Adult)   Pulse 92   Temp 36.2 °C (97.2 °F) (Temporal)   Resp 16   Ht 1.702 m (5' 7\")   Wt 110 kg (243 lb)   SpO2 97%     Constitutional:   Body Habitus: Body mass index is 38.06 kg/m².  Cooperation: Fully cooperates with exam  Appearance: Well-groomed no disheveled    Respiratory-  breathing comfortable on room air, no audible wheezing  Cardiovascular- capillary refills less than 2 seconds. No lower extremity edema is noted.   Psychiatric- alert and oriented ×3. Normal affect.    MSK and Neuro:   On examination patient demonstrates decreased range of motion in lumbar flexion and extension, with significant pain during bilateral facet loading.  Strength testing reveals mild weakness in hip flexion, knee extension, ankle dorsiflexion, and ED LH extension on the right side.  He has 5 out of 5 strength in all musculature on the left side.    Inspection: No evidence of atrophy in bilateral lower extremities throughout   ROM: decreased AROM with flexion, extension, lateral flexion, and rotation bilaterally, with pain   Palpation:   No tenderness to palpation in midline at T1-T12 levels. No tenderness to palpation in the left and right of the midline T1-L5  palpation over SI joint: negative bilaterally    palpation over buttock: negative bilaterally    palpation in hip or over the greater trochanters: negative bilaterally      Lumbar spine Special tests  Neuro tension  Straight leg test positive right, negative left    Slump test positive right, negative left      HIP  FAIR test negative bilaterally    Range of motion in the hips is within normal limits in flexion, extension, abduction, internal rotation, external rotation.    SI joint tests  Observation patient sits on one buttocks: Negative  SI joint compression negative bilaterally    SI joint distraction negative " bilaterally    Thigh thrust test negative bilaterally    BRAYAN test negative bilaterally     Neuro   Key points for the international standards for neurological classification of spinal cord injury (ISNCSCI) to light touch.   Dermatome R L   L2 2 2   L3 2 2   L4 2 2   L5 2 2   S1 2 2   S2 2 2     Motor Exam Lower Extremities  ? Myotome R L   Hip flexion L2 4 5   Knee extension L3 4 5   Ankle dorsiflexion L4 4 5   Toe extension L5 5 5   Ankle plantarflexion S1 5 5       Babinski sign negative bilaterally   Clonus of the ankle negative bilaterally     Reflexes  ?  R L   Patella  2+ 2+   Achilles   2+ 2+           MEDICAL DECISION MAKING    DATA    Labs:   Lab Results   Component Value Date/Time    SODIUM 141 03/05/2025 02:59 PM    POTASSIUM 4.9 03/05/2025 02:59 PM    CHLORIDE 110 03/05/2025 02:59 PM    CO2 21 03/05/2025 02:59 PM    GLUCOSE 121 (H) 03/05/2025 02:59 PM    BUN 16 03/05/2025 02:59 PM    CREATININE 0.91 03/05/2025 02:59 PM        Lab Results   Component Value Date/Time    PROTHROMBTM 13.2 02/01/2019 07:07 PM    INR 0.99 02/01/2019 07:07 PM        Lab Results   Component Value Date/Time    WBC 5.7 01/14/2025 10:00 AM    RBC 5.23 01/14/2025 10:00 AM    HEMOGLOBIN 16.3 01/14/2025 10:00 AM    HEMATOCRIT 49.0 01/14/2025 10:00 AM    MCV 93.7 01/14/2025 10:00 AM    MCH 31.2 01/14/2025 10:00 AM    MCHC 33.3 01/14/2025 10:00 AM    MPV 9.4 01/14/2025 10:00 AM    NEUTSPOLYS 71.30 01/14/2025 10:00 AM    LYMPHOCYTES 13.30 (L) 01/14/2025 10:00 AM    MONOCYTES 8.90 01/14/2025 10:00 AM    EOSINOPHILS 5.10 01/14/2025 10:00 AM    BASOPHILS 0.50 01/14/2025 10:00 AM        Lab Results   Component Value Date/Time    HBA1C 6.6 (A) 01/22/2025 02:56 PM          Imaging:   I personally reviewed following images    Results  Imaging  MRI of the lumbar spine shows mild narrowing of the thecal sac at L3, L4, L4-L5 secondary to epidural lipomatosis. And diffuse lumbar facet arthropathy       MRI of the cervical spine dated 12/16/2024  showed severe right foraminal narrowing at C4-C5 and bilateral foraminal narrowing at C5-C6.         I reviewed the following radiology reports                   Results for orders placed during the hospital encounter of 01/14/25    MR-CERVICAL SPINE-W/O    Impression  Stable appearance of moderate canal narrowing at C4-5 and C5-6.    Stable appearance of severe right foraminal narrowing at C4-5.    Stable appearance of bilateral moderate foraminal narrowing at C5-6.    Results for orders placed during the hospital encounter of 12/16/24    MR-CERVICAL SPINE-WITH & W/O    Impression  1.  Moderate canal narrowing at C4-5 and C5-6.    2.  Severe right foraminal narrowing at C4-5.    3.  Moderate bilateral foraminal narrowing at C5-6.                                                                               Results for orders placed during the hospital encounter of 02/01/19    CT-ABDOMEN-PELVIS WITH    Impression  1.  Asymmetric thickening of the right lower anterior intercostal muscles with a small amount of fluid seen extending between the deep and superficial layers of the right lower chest and anterior lateral abdominal wall muscles likely representing  hematoma. A definite associated rib fracture is not seen.    2.  Fatty liver.    3.  Small left lobe hepatic cyst or hemangioma.    4.  No evidence of bowel obstruction or focal inflammatory change within the abdomen or pelvis.                     Results for orders placed in visit on 01/21/25    DX-CERVICAL SPINE-4+ VIEWS                                   Results for orders placed during the hospital encounter of 01/14/25    DX-SHOULDER 2+ LEFT    Impression  No acute osseous abnormality.              DIAGNOSIS   Visit Diagnoses     ICD-10-CM   1. Spondylosis of cervical spine  M47.812   2. Chronic neck pain  M54.2    G89.29   3. Foraminal stenosis of cervical region  M48.02   4. Bilateral carpal tunnel syndrome  G56.03   5. Spondylosis of lumbar spine  M47.816              ASSESSMENT and PLAN:       Assessment & Plan  1. Chronic low back pain.  The patient reports chronic low back pain with a severity of 5-6 out of 10, which has worsened since the last visit. MRI of the lumbar spine shows arthritis in the lower segments and atrophy of the paraspinal muscles. Physical therapy is recommended to strengthen these muscles. Injections into the low back to numb the nerves of the joints are suggested to alleviate pain. If medial branch block injections provide short-term relief, an ablation procedure may be considered for long-term relief.     The patient is failed conservative treatments with medication management, home exercise program from the direction of physical therapy/physician including the past 8 weeks .  I have ordered a BILATERAL diagnostic medial branch block targeting the L3-4, L4-5, and L5-S1 facet joints #1 and #2.  Procedure #2 is only to be done if #1 is a positive block.    The risks benefits and alternatives to this procedure were discussed and the patient wishes to proceed with the procedure. Risks include but are not limited to damage to surrounding structures, infection, bleeding, worsening of pain which can be permanent, weakness which can be permanent. Benefits include pain relief, improved function. Alternatives includes not doing the procedure.   If there are 2 positive blocks I would consider the patient for radiofrequency neurotomy of the previously blocked nerves.       2. Bilateral carpal tunnel syndrome.  The patient has moderate carpal tunnel syndrome on the right and mild on the left. He reports that his current wrist splints are no longer effective, and he experiences numbness and pain at night. Continued use of bracing is recommended, and new braces are suggested if the current ones are not helping. Potential carpal tunnel injections will be considered at follow-up.        Dieudonne was seen today for follow-up.    Diagnoses and all orders for this  visit:    Spondylosis of cervical spine    Chronic neck pain    Foraminal stenosis of cervical region    Bilateral carpal tunnel syndrome    Spondylosis of lumbar spine  -     Referral to Physical Medicine Rehab  -     Referral to Physical Medicine Rehab                Follow up: after above procedures    Thank you for allowing me to participate in the care of this patient. If you have any questions please not hesitate to contact me.             Please note that this dictation was created using voice recognition software. I have made every reasonable attempt to correct obvious errors but there may be errors of grammar and content that I may have overlooked prior to finalization of this note.    Mark Whitley DO  Physical Medicine and Rehabilitation  Sports Medicine and Spine  [facility]

## 2025-04-10 NOTE — PROGRESS NOTES
[facility] (Physical Medicine and Rehabilitation)  Sports Medicine& Interventional Spine   Follow Up Patient Visit      Chief complaint:   Chief Complaint   Patient presents with    Follow-Up     Image review          HISTORY        HPI  Patient identification: Ta Stokes ,  1965,   With Diagnoses of Spondylosis of cervical spine, Chronic neck pain, Foraminal stenosis of cervical region, Bilateral carpal tunnel syndrome, and Spondylosis of lumbar spine were pertinent to this visit.       At last visit dated 3/13/25  Ta Stokes  1965 male presents today for repeat evaluation.  Previous workup has included bilateral upper extremity EMG which did show concern for left-sided radiculopathy as well as bilateral carpal tunnel syndrome.Repeat evaluation patient reports improvement in his symptoms of bilateral hands as well as improvement in symptoms of the left neck and radiation of pain into the left upper extremity.  He was previously recommended to undergo a cervical interlaminar epidural steroid injection, however this was canceled due to patient being almost completely asymptomatic.  His primary complaint today is related to continued low back pain that is predominantly axial in nature.  On examination patient however did have mild weakness with hip flexion knee extension and ankle dorsiflexion potentially concerning for radiculopathy of the right side.  An MRI of the lumbar spine has been ordered for further evaluation.  Recommend patient follow-up after this is completed to discuss continued management and treatment options.         Previous Workup:  EMG Examination Date: 2/3/2025      Impression:       This is abnormal study  There is electrodiagnostic evidence for an acute and chronic left C5-C6 radiculopathy  There is electrodiagnostic evidence for bilateral median mononeuropathy at the wrist, i.e. carpal tunnel syndrome, that is moderate on the right and mild on the  left.  There is potential concern for a left ulnar mononeuropathy at the elbow i.e. cubital tunnel syndrome however this may have been technical error as there were bilateral below elbow stimulations showed minimal response.  Can consider a repeat EMG in 3 months to further evaluate this finding    Interval History:  History of Present Illness  The patient is a 60-year-old male who presents today for evaluation of chronic low back pain and bilateral carpal tunnel syndrome.    Last visit, MRI of the lumbar spine was ordered for further evaluation of chronic low back pain. Pain today is reported to be 5-6 out of 10 in severity. He notes pain is worse from last visit and significant cramping of the low back. He reports experiencing bilateral lower back pain, which he describes as being around the kidney area. Despite previous evaluations indicating normal kidney function, he continues to experience this discomfort. Additionally, he describes a sensation akin to stretching on the left side of his back, slightly off-center from the spine. His pain does not radiate into his lower extremities. He also reports difficulty in performing simple tasks such as tying his shoes due to the pain. He expresses concern about the potential spread of his back pain. He has noticed a change in his gait, with a tendency to shuffle his feet more than before. He also reports difficulty in transitioning from hardwood to carpeted floors, often catching his feet on the edge. He experiences severe back stiffness after short walks, necessitating brief periods of rest to alleviate the discomfort. The pain is described as muscular or tendinous rather than osseous.    He has been using wrist splints for carpal tunnel syndrome, which initially provided relief but have since become less effective. He wears them every night. He experiences numbness in his hands during sleep, which disrupts his rest. He also reports symptoms of burning, itching,  tingling, and a pins-and-needles sensation when not wearing the splints. He was previously referred to the emergency room for an injection but did not receive it due to resolution of his shoulder pain. He was given the splint and referred back to this provider. He is uncertain about the condition of his braces and whether they are still providing adequate support.    FAMILY HISTORY  His mother had a lot of back issues and had wires and a battery implanted in her back.            PMHx:   Past Medical History:   Diagnosis Date    Arrhythmia     A-fib    Cancer (HCC) 01/2024    prostate cancer    Dental disorder     upper/lower dentures    Dyslipidemia     GERD (gastroesophageal reflux disease)     Heart burn     High cholesterol     Hypertension     Indigestion     Snoring        PSHx:   Past Surgical History:   Procedure Laterality Date    EMG  2/3/2025       Family history   Family History   Problem Relation Age of Onset    Hypertension Mother     Cancer Maternal Grandmother          Medications:   Outpatient Medications Marked as Taking for the 4/10/25 encounter (Office Visit) with Mark Whitley D.O.   Medication Sig Dispense Refill    diclofenac DR (VOLTAREN) 75 MG Tablet Delayed Response Take 1 Tablet by mouth 2 times a day. 60 Tablet 2    gabapentin (NEURONTIN) 100 MG Cap Take 1-3 Capsules by mouth at bedtime as needed (pain). 90 Capsule 3    bisoprolol (ZEBETA) 5 MG Tab Take 1 Tablet by mouth every day. 90 Tablet 3    Degarelix Acetate (FIRMAGON SC)       Empagliflozin (JARDIANCE) 10 MG Tab tablet Take 1 Tablet by mouth every day. 90 Tablet 3    atorvastatin (LIPITOR) 10 MG Tab Take 1 Tablet by mouth every evening. 90 Tablet 3    lisinopril (PRINIVIL) 5 MG Tab Take 1 tablet by mouth once daily 90 Tablet 3    apixaban (ELIQUIS) 5mg Tab Take 1 Tablet by mouth 2 times a day. 60 Tablet 11    tamsulosin (FLOMAX) 0.4 MG capsule Take 1 Capsule by mouth at bedtime.      Calcium Carb-Cholecalciferol (CALCIUM 1000 + D)  1000-20 MG-MCG Tab Take  by mouth.          Current Outpatient Medications on File Prior to Visit   Medication Sig Dispense Refill    diclofenac DR (VOLTAREN) 75 MG Tablet Delayed Response Take 1 Tablet by mouth 2 times a day. 60 Tablet 2    gabapentin (NEURONTIN) 100 MG Cap Take 1-3 Capsules by mouth at bedtime as needed (pain). 90 Capsule 3    bisoprolol (ZEBETA) 5 MG Tab Take 1 Tablet by mouth every day. 90 Tablet 3    Degarelix Acetate (FIRMAGON SC)       Empagliflozin (JARDIANCE) 10 MG Tab tablet Take 1 Tablet by mouth every day. 90 Tablet 3    atorvastatin (LIPITOR) 10 MG Tab Take 1 Tablet by mouth every evening. 90 Tablet 3    lisinopril (PRINIVIL) 5 MG Tab Take 1 tablet by mouth once daily 90 Tablet 3    apixaban (ELIQUIS) 5mg Tab Take 1 Tablet by mouth 2 times a day. 60 Tablet 11    tamsulosin (FLOMAX) 0.4 MG capsule Take 1 Capsule by mouth at bedtime.      Calcium Carb-Cholecalciferol (CALCIUM 1000 + D) 1000-20 MG-MCG Tab Take  by mouth.      methylPREDNISolone (MEDROL DOSEPAK) 4 MG Tablet Therapy Pack Follow schedule on package instructions. (Patient not taking: Reported on 4/10/2025) 21 Tablet 0    Misc. Devices Misc Overnight oximetry, O2 concentrator to keep nocturnal O2 sats > 92% (Patient not taking: Reported on 12/12/2024) 1 Device 0     No current facility-administered medications on file prior to visit.         Allergies:   Allergies   Allergen Reactions    Metformin Diarrhea     diarrhea       Social Hx:   Social History     Socioeconomic History    Marital status: Single     Spouse name: Not on file    Number of children: Not on file    Years of education: Not on file    Highest education level: Associate degree: occupational, technical, or vocational program   Occupational History    Not on file   Tobacco Use    Smoking status: Former     Current packs/day: 0.00     Average packs/day: 1 pack/day for 39.0 years (39.0 ttl pk-yrs)     Types: Cigarettes     Start date: 1/1/1979     Quit date:  2018     Years since quittin.2    Smokeless tobacco: Never    Tobacco comments:     1ppd   Vaping Use    Vaping status: Never Used   Substance and Sexual Activity    Alcohol use: Not Currently     Comment: once in awhile, 1 beer    Drug use: No    Sexual activity: Not Currently     Partners: Female   Other Topics Concern    Not on file   Social History Narrative    Not on file     Social Drivers of Health     Financial Resource Strain: High Risk (3/22/2023)    Overall Financial Resource Strain (CARDIA)     Difficulty of Paying Living Expenses: Hard   Food Insecurity: Food Insecurity Present (3/22/2023)    Hunger Vital Sign     Worried About Running Out of Food in the Last Year: Sometimes true     Ran Out of Food in the Last Year: Never true   Transportation Needs: No Transportation Needs (3/22/2023)    PRAPARE - Transportation     Lack of Transportation (Medical): No     Lack of Transportation (Non-Medical): No   Physical Activity: Sufficiently Active (3/22/2023)    Exercise Vital Sign     Days of Exercise per Week: 7 days     Minutes of Exercise per Session: 150+ min   Stress: Stress Concern Present (3/22/2023)    [employer] - Occupational Stress Questionnaire     Feeling of Stress : To some extent   Social Connections: Moderately Isolated (3/22/2023)    Social Connection and Isolation Panel [NHANES]     Frequency of Communication with Friends and Family: More than three times a week     Frequency of Social Gatherings with Friends and Family: More than three times a week     Attends Taoism Services: Never     Active Member of Clubs or Organizations: No     Attends Club or Organization Meetings: Never     Marital Status: Living with partner   Intimate Partner Violence: Not on file   Housing Stability: Low Risk  (3/22/2023)    Housing Stability Vital Sign     Unable to Pay for Housing in the Last Year: No     Number of Places Lived in the Last Year: 1     Unstable Housing in  "the Last Year: No         EXAMINATION     Physical Exam:   Vitals: /76 (BP Location: Right arm, Patient Position: Sitting, BP Cuff Size: Adult)   Pulse 92   Temp 36.2 °C (97.2 °F) (Temporal)   Resp 16   Ht 1.702 m (5' 7\")   Wt 110 kg (243 lb)   SpO2 97%     Constitutional:   Body Habitus: Body mass index is 38.06 kg/m².  Cooperation: Fully cooperates with exam  Appearance: Well-groomed no disheveled    Respiratory-  breathing comfortable on room air, no audible wheezing  Cardiovascular- capillary refills less than 2 seconds. No lower extremity edema is noted.   Psychiatric- alert and oriented ×3. Normal affect.    MSK and Neuro:   On examination patient demonstrates decreased range of motion in lumbar flexion and extension, with significant pain during bilateral facet loading.  Strength testing reveals mild weakness in hip flexion, knee extension, ankle dorsiflexion, and ED LH extension on the right side.  He has 5 out of 5 strength in all musculature on the left side.    Inspection: No evidence of atrophy in bilateral lower extremities throughout   ROM: decreased AROM with flexion, extension, lateral flexion, and rotation bilaterally, with pain   Palpation:   No tenderness to palpation in midline at T1-T12 levels. No tenderness to palpation in the left and right of the midline T1-L5  palpation over SI joint: negative bilaterally    palpation over buttock: negative bilaterally    palpation in hip or over the greater trochanters: negative bilaterally      Lumbar spine Special tests  Neuro tension  Straight leg test positive right, negative left    Slump test positive right, negative left      HIP  FAIR test negative bilaterally    Range of motion in the hips is within normal limits in flexion, extension, abduction, internal rotation, external rotation.    SI joint tests  Observation patient sits on one buttocks: Negative  SI joint compression negative bilaterally    SI joint distraction negative " bilaterally    Thigh thrust test negative bilaterally    BRAYAN test negative bilaterally     Neuro   Key points for the international standards for neurological classification of spinal cord injury (ISNCSCI) to light touch.   Dermatome R L   L2 2 2   L3 2 2   L4 2 2   L5 2 2   S1 2 2   S2 2 2     Motor Exam Lower Extremities  ? Myotome R L   Hip flexion L2 4 5   Knee extension L3 4 5   Ankle dorsiflexion L4 4 5   Toe extension L5 5 5   Ankle plantarflexion S1 5 5       Babinski sign negative bilaterally   Clonus of the ankle negative bilaterally     Reflexes  ?  R L   Patella  2+ 2+   Achilles   2+ 2+           MEDICAL DECISION MAKING    DATA    Labs:   Lab Results   Component Value Date/Time    SODIUM 141 03/05/2025 02:59 PM    POTASSIUM 4.9 03/05/2025 02:59 PM    CHLORIDE 110 03/05/2025 02:59 PM    CO2 21 03/05/2025 02:59 PM    GLUCOSE 121 (H) 03/05/2025 02:59 PM    BUN 16 03/05/2025 02:59 PM    CREATININE 0.91 03/05/2025 02:59 PM        Lab Results   Component Value Date/Time    PROTHROMBTM 13.2 02/01/2019 07:07 PM    INR 0.99 02/01/2019 07:07 PM        Lab Results   Component Value Date/Time    WBC 5.7 01/14/2025 10:00 AM    RBC 5.23 01/14/2025 10:00 AM    HEMOGLOBIN 16.3 01/14/2025 10:00 AM    HEMATOCRIT 49.0 01/14/2025 10:00 AM    MCV 93.7 01/14/2025 10:00 AM    MCH 31.2 01/14/2025 10:00 AM    MCHC 33.3 01/14/2025 10:00 AM    MPV 9.4 01/14/2025 10:00 AM    NEUTSPOLYS 71.30 01/14/2025 10:00 AM    LYMPHOCYTES 13.30 (L) 01/14/2025 10:00 AM    MONOCYTES 8.90 01/14/2025 10:00 AM    EOSINOPHILS 5.10 01/14/2025 10:00 AM    BASOPHILS 0.50 01/14/2025 10:00 AM        Lab Results   Component Value Date/Time    HBA1C 6.6 (A) 01/22/2025 02:56 PM          Imaging:   I personally reviewed following images    Results  Imaging  MRI of the lumbar spine shows mild narrowing of the thecal sac at L3, L4, L4-L5 secondary to epidural lipomatosis. And diffuse lumbar facet arthropathy       MRI of the cervical spine dated 12/16/2024  showed severe right foraminal narrowing at C4-C5 and bilateral foraminal narrowing at C5-C6.         I reviewed the following radiology reports                   Results for orders placed during the hospital encounter of 01/14/25    MR-CERVICAL SPINE-W/O    Impression  Stable appearance of moderate canal narrowing at C4-5 and C5-6.    Stable appearance of severe right foraminal narrowing at C4-5.    Stable appearance of bilateral moderate foraminal narrowing at C5-6.    Results for orders placed during the hospital encounter of 12/16/24    MR-CERVICAL SPINE-WITH & W/O    Impression  1.  Moderate canal narrowing at C4-5 and C5-6.    2.  Severe right foraminal narrowing at C4-5.    3.  Moderate bilateral foraminal narrowing at C5-6.                                                                               Results for orders placed during the hospital encounter of 02/01/19    CT-ABDOMEN-PELVIS WITH    Impression  1.  Asymmetric thickening of the right lower anterior intercostal muscles with a small amount of fluid seen extending between the deep and superficial layers of the right lower chest and anterior lateral abdominal wall muscles likely representing  hematoma. A definite associated rib fracture is not seen.    2.  Fatty liver.    3.  Small left lobe hepatic cyst or hemangioma.    4.  No evidence of bowel obstruction or focal inflammatory change within the abdomen or pelvis.                     Results for orders placed in visit on 01/21/25    DX-CERVICAL SPINE-4+ VIEWS                                   Results for orders placed during the hospital encounter of 01/14/25    DX-SHOULDER 2+ LEFT    Impression  No acute osseous abnormality.              DIAGNOSIS   Visit Diagnoses     ICD-10-CM   1. Spondylosis of cervical spine  M47.812   2. Chronic neck pain  M54.2    G89.29   3. Foraminal stenosis of cervical region  M48.02   4. Bilateral carpal tunnel syndrome  G56.03   5. Spondylosis of lumbar spine  M47.816              ASSESSMENT and PLAN:       Assessment & Plan  1. Chronic low back pain.  The patient reports chronic low back pain with a severity of 5-6 out of 10, which has worsened since the last visit. MRI of the lumbar spine shows arthritis in the lower segments and atrophy of the paraspinal muscles. Physical therapy is recommended to strengthen these muscles. Injections into the low back to numb the nerves of the joints are suggested to alleviate pain. If medial branch block injections provide short-term relief, an ablation procedure may be considered for long-term relief.     The patient is failed conservative treatments with medication management, home exercise program from the direction of physical therapy/physician including the past 8 weeks .  I have ordered a BILATERAL diagnostic medial branch block targeting the L3-4, L4-5, and L5-S1 facet joints #1 and #2.  Procedure #2 is only to be done if #1 is a positive block.    The risks benefits and alternatives to this procedure were discussed and the patient wishes to proceed with the procedure. Risks include but are not limited to damage to surrounding structures, infection, bleeding, worsening of pain which can be permanent, weakness which can be permanent. Benefits include pain relief, improved function. Alternatives includes not doing the procedure.   If there are 2 positive blocks I would consider the patient for radiofrequency neurotomy of the previously blocked nerves.       2. Bilateral carpal tunnel syndrome.  The patient has moderate carpal tunnel syndrome on the right and mild on the left. He reports that his current wrist splints are no longer effective, and he experiences numbness and pain at night. Continued use of bracing is recommended, and new braces are suggested if the current ones are not helping. Potential carpal tunnel injections will be considered at follow-up.        Dieudonne was seen today for follow-up.    Diagnoses and all orders for this  visit:    Spondylosis of cervical spine    Chronic neck pain    Foraminal stenosis of cervical region    Bilateral carpal tunnel syndrome    Spondylosis of lumbar spine  -     Referral to Physical Medicine Rehab  -     Referral to Physical Medicine Rehab                Follow up: after above procedures    Thank you for allowing me to participate in the care of this patient. If you have any questions please not hesitate to contact me.             Please note that this dictation was created using voice recognition software. I have made every reasonable attempt to correct obvious errors but there may be errors of grammar and content that I may have overlooked prior to finalization of this note.    Mark Whitley DO  Physical Medicine and Rehabilitation  Sports Medicine and Spine  [facility]

## 2025-04-12 DIAGNOSIS — M48.02 SPINAL STENOSIS OF CERVICAL REGION WITH RADICULOPATHY: ICD-10-CM

## 2025-04-12 DIAGNOSIS — M54.12 SPINAL STENOSIS OF CERVICAL REGION WITH RADICULOPATHY: ICD-10-CM

## 2025-04-14 RX ORDER — DICLOFENAC SODIUM 75 MG/1
75 TABLET, DELAYED RELEASE ORAL 2 TIMES DAILY
Qty: 60 TABLET | Refills: 0 | Status: SHIPPED | OUTPATIENT
Start: 2025-04-14 | End: 2025-04-30

## 2025-04-14 NOTE — Clinical Note
REFERRAL APPROVAL NOTICE         Sent on April 14, 2025                   Dieudonnedilan Stokes  [address] [facility] [address]                   Dear Mr. Stokes,    After a careful review of the medical information and benefit coverage, [facility] has processed your referral. See below for additional details.    If applicable, you must be actively enrolled with your insurance for coverage of the authorized service. If you have any questions regarding your coverage, please contact your insurance directly.    REFERRAL INFORMATION   Referral #:  [id number]  Referred-To Department    Referred-By Provider:  Physical Medicine and Rehab    Mark Whitley D.O.   Pain Management       [address]  [place] [address]  [facility] [id number] [phone] [address] [place] [id number]  [phone]    Referral Start Date:  04/10/2025  Referral End Date:   07/14/2025             SCHEDULING  If you do not already have an appointment, please call [phone] to make an appointment.     MORE INFORMATION  If you do not already have a [facility] [employer] account, sign up at: [email or web address]  You can access your medical information, make appointments, see lab results, billing information, and more.  If you have questions regarding this referral, please contact  the [facility] Referrals department at:             [phone]. Monday - Friday 8:00AM - 5:00PM.     Sincerely,    [facility]

## 2025-04-14 NOTE — TELEPHONE ENCOUNTER
Received request via: Pharmacy    Was the patient seen in the last year in this department? Yes    Does the patient have an active prescription (recently filled or refills available) for medication(s) requested? No    Pharmacy Name: [facility].    Does the patient have [facility] Plus and need 100-day supply? (This applies to ALL medications) Patient does not have SCP

## 2025-04-16 ENCOUNTER — APPOINTMENT (OUTPATIENT)
Dept: RADIOLOGY | Facility: REHABILITATION | Age: 60
End: 2025-04-16
Attending: PHYSICAL MEDICINE & REHABILITATION
Payer: MEDICAID

## 2025-04-16 ENCOUNTER — HOSPITAL ENCOUNTER (OUTPATIENT)
Facility: REHABILITATION | Age: 60
End: 2025-04-16
Attending: PHYSICAL MEDICINE & REHABILITATION | Admitting: PHYSICAL MEDICINE & REHABILITATION
Payer: MEDICAID

## 2025-04-16 VITALS
RESPIRATION RATE: 18 BRPM | SYSTOLIC BLOOD PRESSURE: 95 MMHG | BODY MASS INDEX: 37.37 KG/M2 | TEMPERATURE: 98 F | HEIGHT: 67 IN | HEART RATE: 86 BPM | WEIGHT: 238.1 LBS | DIASTOLIC BLOOD PRESSURE: 66 MMHG | OXYGEN SATURATION: 96 %

## 2025-04-16 PROCEDURE — 64493 INJ PARAVERT F JNT L/S 1 LEV: CPT

## 2025-04-16 PROCEDURE — 64494 INJ PARAVERT F JNT L/S 2 LEV: CPT | Mod: RT,PO,KX

## 2025-04-16 PROCEDURE — 700111 HCHG RX REV CODE 636 W/ 250 OVERRIDE (IP): Mod: JZ

## 2025-04-16 PROCEDURE — 700117 HCHG RX CONTRAST REV CODE 255

## 2025-04-16 PROCEDURE — 64494 INJ PARAVERT F JNT L/S 2 LEV: CPT | Mod: LT,PO,KX

## 2025-04-16 PROCEDURE — 700101 HCHG RX REV CODE 250

## 2025-04-16 PROCEDURE — 64495 INJ PARAVERT F JNT L/S 3 LEV: CPT | Mod: LT,PO,KX

## 2025-04-16 PROCEDURE — 64495 INJ PARAVERT F JNT L/S 3 LEV: CPT | Mod: RT,PO,KX

## 2025-04-16 RX ORDER — LIDOCAINE HYDROCHLORIDE 10 MG/ML
INJECTION, SOLUTION EPIDURAL; INFILTRATION; INTRACAUDAL; PERINEURAL
Status: COMPLETED
Start: 2025-04-16 | End: 2025-04-16

## 2025-04-16 RX ORDER — GABAPENTIN 100 MG/1
100-300 CAPSULE ORAL NIGHTLY PRN
Qty: 90 CAPSULE | Refills: 3 | Status: SHIPPED | OUTPATIENT
Start: 2025-04-16

## 2025-04-16 RX ORDER — LIDOCAINE HYDROCHLORIDE 20 MG/ML
INJECTION, SOLUTION EPIDURAL; INFILTRATION; INTRACAUDAL; PERINEURAL
Status: COMPLETED
Start: 2025-04-16 | End: 2025-04-16

## 2025-04-16 RX ADMIN — LIDOCAINE HYDROCHLORIDE 10 ML: 10 INJECTION, SOLUTION EPIDURAL; INFILTRATION; INTRACAUDAL; PERINEURAL at 09:52

## 2025-04-16 RX ADMIN — LIDOCAINE HYDROCHLORIDE 10 ML: 20 INJECTION, SOLUTION EPIDURAL; INFILTRATION; INTRACAUDAL; PERINEURAL at 09:55

## 2025-04-16 RX ADMIN — IOHEXOL 5 ML: 240 INJECTION, SOLUTION INTRATHECAL; INTRAVASCULAR; INTRAVENOUS; ORAL at 09:52

## 2025-04-16 ASSESSMENT — PAIN DESCRIPTION - PAIN TYPE: TYPE: CHRONIC PAIN

## 2025-04-16 ASSESSMENT — FIBROSIS 4 INDEX: FIB4 SCORE: 1.16

## 2025-04-16 NOTE — TELEPHONE ENCOUNTER
gabapentin (NEURONTIN) 100 MG Cap       Received request via: Patient    Was the patient seen in the last year in this department? Yes    Does the patient have an active prescription (recently filled or refills available) for medication(s) requested? No    Pharmacy Name: [facility] [address] [place] [address], [place] - [address] [facility]     Does the patient have [facility] Plus and need 100-day supply? (This applies to ALL medications) Patient does not have SCP

## 2025-04-16 NOTE — OP REPORT
Date of Service: 4/16/2025     Patient: Ta Stokes 60 y.o. male     MRN: [id number]     Physician/s: Satya Ponce MD    Pre-operative Diagnosis: Lumbosacral spondylosis, facet arthropathy. The patient was NOT seen for lumbar radiculopathy today.     Post-operative Diagnosis: Lumbosacral spondylosis, facet arthropathy. The patient was NOT seen for lumbar radiculopathy today.     Procedure: Medial Branch Blocks targeting the bilateral  L3-4, L4-5, and L5-S1  facet joints     Description of procedure:    The risks, benefits, and alternatives of the procedure were reviewed and discussed with the patient.  Written informed consent was freely obtained. A pre-procedural time-out was conducted by the physician verifying patient’s identity, procedure to be performed, procedure site and side, and allergy verification. Appropriate equipment was determined to be in place for the procedure.         In the fluoroscopy suite the patient was placed in a prone position and the skin was prepped and draped in the usual sterile fashion. The fluoroscope was placed over the lower back at the appropriate angles, and the targets for injection were marked. A 27g needle was placed into each of the markings at the levels below, and approx 1mL of 1% Lidocaine was injected subcutaneously into the epidermal and dermal layers. The needle was removed intact.     Using an oblique view, A 22g 5 inch needle was then placed at the intersection of the transverse process and superior articular process at the L3-4 facet joint where the L2 medial branch runs on the right side. The needle tips were then verified by AP, oblique, and lateral views.     Using an oblique view, A 22g 5 inch needle was then placed at the intersection of the transverse process and superior articular process at the L4-5 facet joint where the L3 medial branch runs  on the right side. The needle tips were then verified by AP, oblique, and lateral views.     Using an oblique  view, A 22g 5 inch needle was then placed at the intersection of the transverse process and superior articular process at the L5-S1 facet joint where the L4 medial branch runs  on the right side. The needle tips were then verified by AP, oblique, and lateral views.     Using an oblique view, A 22g 5 inch needle was then placed at the intersection of the transverse process and superior articular process at the S1 facet where the L5 dorsal ramus runs  on the right side. The needle tips were then verified by AP, oblique, and lateral views.       Using an oblique view, A 22g 5 inch needle was then placed at the intersection of the transverse process and superior articular process at the L3-4 facet joint where the L2 medial branch runs  on the left side. The needle tips were then verified by AP, oblique, and lateral views.     Using an oblique view, A 22g 5 inch needle was then placed at the intersection of the transverse process and superior articular process at the L4-5 facet joint where the L3 medial branch runs  on the left side. The needle tips were then verified by AP, oblique, and lateral views.     Using an oblique view, A 22g 5 inch needle was then placed at the intersection of the transverse process and superior articular process at the L5-S1 facet joint where the L4 medial branch runs  on the left side. The needle tips were then verified by AP, oblique, and lateral views.     Using an oblique view, A 22g 5 inch needle was then placed at the intersection of the transverse process and superior articular process at the S1 facet where the L5 dorsal ramus runs  on the left side. The needle tips were then verified by AP, oblique, and lateral views.      In the AP view, less than 1mL of contrast dye was used to highlight the medial branch while the fluoroscope was running live at the levels above. Following negative aspiration, approximately 1mL of 2% lidocaine  was then injected at the above levels, and the needles  were removed intact after restyleted. The patient's back was covered with a 4x4 gauze, the area was cleansed with sterile normal saline, and a dressing was applied.     There were no complications noted, the patient was hemodynamically stable, and tolerated the procedure well.     This was a challenging procedure given the patients Body mass index is 37.29 kg/m².. This required longer needles.  A typical procedure such as this would require 25g 3.5 inch needles.  This procedure required 22g 5 inch needles.  This added to the mental effort for complexity this procedure.  This also made acquiring fluoroscopic images more time-consuming and challenging.  Final fluoroscopic images were obtained and saved.     The patient had 90 percent pain relief postprocedure  Preprocedure pain 7/10 NRS  Postprocedure pain 1 /10 NRS       Follow-up as scheduled      Satya Ponce MD  Physical Medicine and Rehabilitation  Interventional Spine and Sports Physiatry  [facility]            CPT  Intraarticular joint or medial branch block (MBB) - lumbar or sacral (1st level):  23253-85-05-vg  Intraarticular joint or medial branch block (MBB) - lumbar or sacral (2nd level):  44726-58-91-ex  Intraarticular joint or medial branch block (MBB) - lumbar or sacral (3rd level):  [id number]

## 2025-04-16 NOTE — OR SURGEON
Immediate Post OP Note    Pre-Op Diagnosis Codes:      * Lumbar spondylosis [M47.816]      Post-Op Diagnosis Codes:     * Lumbar spondylosis [M47.816]      Procedure(s):  Diagnostic medial branch blocks targeting the BILATERAL L3-4, L4-5, and L5-S1 facet joints with fluoroscopic guidance - Wound Class: Clean    Surgeon(s):  Satya Ponce M.D.    Anesthesiologist/Type of Anesthesia:  No anesthesia staff entered./Local    Surgical Staff:  Circulator: Winnie Marquez R.N.  Scrub Person: Dana Paniagua  Radiology Technologist: Flower Montano    Specimens removed if any:  * No specimens in log *    Estimated Blood Loss: None    Findings: None    Complications: None        4/16/2025 10:30 AM Satya Ponce M.D.

## 2025-04-16 NOTE — INTERVAL H&P NOTE
Consented Procedure: Diagnostic medial branch blocks targeting the BILATERAL L3-4, L4-5, and L5-S1 facet joints with fluoroscopic guidance  I have examined the patient, provided the risks, benefits, and alternatives to the procedure(s) indicated on the signed consent form, and the patient wishes to proceed.    H&P reviewed. The patient was examined and there are no changes to the H&P      Satya Ponce M.D.  04/16/25 8:59 AM

## 2025-04-17 ENCOUNTER — TELEPHONE (OUTPATIENT)
Dept: PHYSICAL MEDICINE AND REHAB | Facility: MEDICAL CENTER | Age: 60
End: 2025-04-17
Payer: MEDICAID

## 2025-04-17 NOTE — TELEPHONE ENCOUNTER
Called for post-sp check-up. Pt reported the following regarding the procedure site: Diagnostic medial branch blocks targeting the BILATERAL L3-4, L4-5, and L5-S1 facet joints with fluoroscopic guidance     Change in pain?: Yes    Concerns?: No    Confirmed [facility] appt?: Yes

## 2025-04-18 NOTE — Clinical Note
REFERRAL APPROVAL NOTICE         Sent on April 18, 2025                   Dieudonnedilan Stokes  [address] [facility] [address]                   Dear Mr. Stokes,    After a careful review of the medical information and benefit coverage, [facility] has processed your referral. See below for additional details.    If applicable, you must be actively enrolled with your insurance for coverage of the authorized service. If you have any questions regarding your coverage, please contact your insurance directly.    REFERRAL INFORMATION   Referral #:  [id number]  Referred-To Department    Referred-By Provider:  Physical Medicine and Rehab    Mark Whitley D.O.   Pain Management       [address]  [place] [address]  [facility] [id number] [phone] [address] [place] [id number]  [phone]    Referral Start Date:  04/18/2025  Referral End Date:   07/18/2025             SCHEDULING  If you do not already have an appointment, please call [phone] to make an appointment.     MORE INFORMATION  If you do not already have a [employer] account, sign up at: [email or web address]  You can access your medical information, make appointments, see lab results, billing information, and more.  If you have questions regarding this referral, please contact  the [facility] Referrals department at:             [phone]. Monday - Friday 8:00AM - 5:00PM.     Sincerely,    [facility]

## 2025-04-23 ENCOUNTER — HOSPITAL ENCOUNTER (OUTPATIENT)
Dept: LAB | Facility: MEDICAL CENTER | Age: 60
End: 2025-04-23
Attending: NURSE PRACTITIONER
Payer: MEDICAID

## 2025-04-23 ENCOUNTER — OFFICE VISIT (OUTPATIENT)
Dept: MEDICAL GROUP | Facility: MEDICAL CENTER | Age: 60
End: 2025-04-23
Attending: NURSE PRACTITIONER
Payer: MEDICAID

## 2025-04-23 VITALS
RESPIRATION RATE: 16 BRPM | TEMPERATURE: 98.4 F | SYSTOLIC BLOOD PRESSURE: 122 MMHG | DIASTOLIC BLOOD PRESSURE: 76 MMHG | BODY MASS INDEX: 38.92 KG/M2 | WEIGHT: 248 LBS | OXYGEN SATURATION: 90 % | HEART RATE: 88 BPM | HEIGHT: 67 IN

## 2025-04-23 DIAGNOSIS — J44.9 CHRONIC OBSTRUCTIVE PULMONARY DISEASE, UNSPECIFIED COPD TYPE (HCC): ICD-10-CM

## 2025-04-23 DIAGNOSIS — G56.03 CARPAL TUNNEL SYNDROME ON BOTH SIDES: ICD-10-CM

## 2025-04-23 DIAGNOSIS — M54.12 SPINAL STENOSIS OF CERVICAL REGION WITH RADICULOPATHY: ICD-10-CM

## 2025-04-23 DIAGNOSIS — M48.02 SPINAL STENOSIS OF CERVICAL REGION WITH RADICULOPATHY: ICD-10-CM

## 2025-04-23 DIAGNOSIS — I87.2 VENOUS INSUFFICIENCY OF BOTH LOWER EXTREMITIES: ICD-10-CM

## 2025-04-23 DIAGNOSIS — Z71.1 CONCERN ABOUT MEMORY: ICD-10-CM

## 2025-04-23 DIAGNOSIS — C61 CARCINOMA OF PROSTATE (HCC): Chronic | ICD-10-CM

## 2025-04-23 DIAGNOSIS — I73.9 PVD (PERIPHERAL VASCULAR DISEASE) (HCC): ICD-10-CM

## 2025-04-23 DIAGNOSIS — H93.13 TINNITUS AURIUM, BILATERAL: ICD-10-CM

## 2025-04-23 DIAGNOSIS — R06.02 SHORTNESS OF BREATH: ICD-10-CM

## 2025-04-23 PROCEDURE — 36415 COLL VENOUS BLD VENIPUNCTURE: CPT

## 2025-04-23 PROCEDURE — 3074F SYST BP LT 130 MM HG: CPT | Performed by: FAMILY MEDICINE

## 2025-04-23 PROCEDURE — 99212 OFFICE O/P EST SF 10 MIN: CPT | Performed by: FAMILY MEDICINE

## 2025-04-23 PROCEDURE — 3078F DIAST BP <80 MM HG: CPT | Performed by: FAMILY MEDICINE

## 2025-04-23 PROCEDURE — 99214 OFFICE O/P EST MOD 30 MIN: CPT | Performed by: FAMILY MEDICINE

## 2025-04-23 PROCEDURE — 85379 FIBRIN DEGRADATION QUANT: CPT

## 2025-04-23 ASSESSMENT — FIBROSIS 4 INDEX: FIB4 SCORE: 1.16

## 2025-04-23 NOTE — PROGRESS NOTES
Verbal consent was acquired by the patient to use [employer] ambient listening note generation during this visit.    Subjective   Chief Complaint   Patient presents with    Follow-Up        HPI:   Ta presents today with    History of Present Illness  The patient presents for a follow-up visit.    Chronic Neck Pain  Chronic neck pain, attributed to cervical spondylosis and foraminal stenosis, has been managed under the care of Dr. Whitley and Dr. Ponce. An injection was administered in the lower back for pain management, but numbness in the hands persists, potentially requiring surgical intervention. An epidural steroid injection is scheduled with Dr. Ponce, and a follow-up appointment with Dr. Whitley is set for tomorrow. Additional appointments with Dr. Ponce are planned for the next two weeks, including a nerve cauterization procedure. A nightly brace is used as recommended.  - Onset: Chronic  - Location: Neck, lower back, hands  - Character: Pain, numbness  - Alleviating/Aggravating Factors: Injection in the lower back, nightly brace  - Timing: Persistent numbness in hands, scheduled epidural steroid injection, and nerve cauterization  - Severity: Requires potential surgical intervention    Memory Lapses  Memory lapses, such as forgetting recent events or misplacing items, are reported, though long-term memory remains intact. A system has been implemented to manage the medication schedule.  - Character: Forgetting recent events, misplacing items  - Severity: Long-term memory remains intact    Lightheadedness and Dizziness  Lightheadedness and dizziness were experienced upon standing after a 20-25 minute wait in the clinic. A CT scan was recommended but has not yet been scheduled. CPAP with oxygen supplementation continues to be used.  - Onset: Upon standing after a 20-25 minute wait  - Character: Lightheadedness, dizziness  - Timing: Experienced during clinic visit    Atrial Fibrillation  Eliquis is taken  for atrial fibrillation, with discontinuation 5 days prior to any procedure. Blood work was performed today to monitor for potential blood clots due to the temporary cessation of Eliquis.  - Alleviating/Aggravating Factors: Discontinuation of Eliquis 5 days prior to procedures  - Timing: Blood work performed today    COPD  Recently switched from Anoro to Breztri for COPD, taking 2 puffs in the morning and 2 at night, with only 2 days on the new medication. A follow-up appointment with Tammy Franco is scheduled in about a month.  - Onset: Recently switched medication  - Character: COPD management  - Timin puffs in the morning and 2 at night    Prostate Cancer  The final testosterone injection for prostate cancer is scheduled for Friday. Christel Zafar has not been seen in several months, with care provided by a nurse.  - Timing: Final injection scheduled for Friday    Leg Issues  Compression socks were prescribed by a doctor at [facility], to be worn for 90 days due to insurance requirements. Follow-up was not possible as the doctor no longer works there. Compression socks are worn daily, and an ultrasound for leg issues is not recalled.  - Character: Leg issues requiring compression socks  - Timing: Worn daily    Tinnitus  Tinnitus is reported, and treatment options are being sought.  - Character: Tinnitus    Podiatry Visits  Occasional podiatrist visits occur for toenail clipping due to the inability to reach the feet.  - Character: Inability to reach feet  - Timing: Occasional visits      Health Maintenance Due   Topic Date Due    Hepatitis B Vaccine (Hep B) (1 of 3 - 19+ 3-dose series) Never done    Zoster (Shingles) Vaccines (1 of 2) Never done    COVID-19 Vaccine (3 - 2024- season) 2024       Objective   Social History     Tobacco Use    Smoking status: Former     Current packs/day: 0.00     Average packs/day: 1 pack/day for 39.0 years (39.0 ttl pk-yrs)     Types: Cigarettes     Start date:  "1979     Quit date: 2018     Years since quittin.3    Smokeless tobacco: Never    Tobacco comments:     1ppd   Vaping Use    Vaping status: Never Used   Substance Use Topics    Alcohol use: Not Currently     Comment: once in awhile, 1 beer    Drug use: No       Exam:  /76   Pulse 88   Temp 36.9 °C (98.4 °F)   Resp 16   Ht 1.702 m (5' 7.01\")   Wt 112 kg (248 lb)   SpO2 90%   BMI 38.83 kg/m²     Physical Exam  Constitutional: Alert, no distress  Skin: No rashes in visible areas  Eye: Conjunctiva clear, lids normal  Respiratory: Unlabored respiratory effort, no cough  MSK: Normal gait, moves all extremities  Psych: Alert and oriented x3, normal affect and mood    Allergies   Allergen Reactions    Metformin Diarrhea     diarrhea       [facility] [address] [place], [place] [address] [address] [address]  Phone: [phone] Fax: [phone]    Current Outpatient Medications   Medication Sig Dispense Refill    Budeson-Glycopyrrol-Formoterol (BREZTRI AEROSPHERE) 160-9-4.8 MCG/ACT Aerosol Inhale.      gabapentin (NEURONTIN) 100 MG Cap Take 1-3 Capsules by mouth at bedtime as needed (pain). 90 Capsule 3    diclofenac DR (VOLTAREN) 75 MG Tablet Delayed Response Take 1 tablet by mouth twice daily 60 Tablet 0    bisoprolol (ZEBETA) 5 MG Tab Take 1 Tablet by mouth every day. 90 Tablet 3    Empagliflozin (JARDIANCE) 10 MG Tab tablet Take 1 Tablet by mouth every day. 90 Tablet 3    atorvastatin (LIPITOR) 10 MG Tab Take 1 Tablet by mouth every evening. 90 Tablet 3    lisinopril (PRINIVIL) 5 MG Tab Take 1 tablet by mouth once daily 90 Tablet 3    apixaban (ELIQUIS) 5mg Tab Take 1 Tablet by mouth 2 times a day. 60 Tablet 11    tamsulosin (FLOMAX) 0.4 MG capsule Take 1 Capsule by mouth at bedtime.       No current facility-administered medications for this visit.       Assessment & Plan    60 y.o. male with the following -   1. Spinal stenosis of cervical region with " radiculopathy        2. Carpal tunnel syndrome on both sides        3. Shortness of breath        4. Chronic obstructive pulmonary disease, unspecified COPD type (HCC)        5. Venous insufficiency of both lower extremities  US-EXTREMITY ARTERY LOWER BILAT W/NOHEMI (COMBO)    REFERRAL TO CARDIOLOGY      6. PVD (peripheral vascular disease) ([facility])  REFERRAL TO CARDIOLOGY      7. Concern about memory        8. Carcinoma of prostate (HCC)        9. Tinnitus aurium, bilateral          Assessment & Plan  1. Chronic neck pain: Chronic.  - Likely due to cervical spondylosis and foraminal stenosis.  - Continue working with the physiatry team for further management.  - Epidural steroid injection scheduled with Dr. Ponce.  - Follow-up with Dr. Whitley scheduled for tomorrow to review the recent procedure.    2. Carpal tunnel syndrome.  - Continues to experience numbness in hands.  - Use of a brace during sleep recommended.  - Potential injections for carpal tunnel syndrome to be discussed in the upcoming visit with Dr. Whitley.  - Continued monitoring of symptoms and effectiveness of brace use.    3. Memory issues.  - Reports forgetfulness, particularly with short-term memory.  - Could be related to overall health condition, including pain and anxiety.  - Monitor the situation and report any worsening symptoms or if it affects daily activities such as taking medications or attending appointments.  - Counseling provided on common causes of memory issues and reassurance given.    4. Shortness of breath.  - May be due to deconditioning from various health issues.  - CT scan recommended by Tammy Franco NP, to be followed up on.  - Currently using CPAP with supplemental oxygen.  - Monitoring of symptoms and follow-up appointment scheduled in about a month.    5. Atrial fibrillation.  - On Eliquis 5 mg twice a day, except for 5 days prior to each procedure.  - Blood work done today to ensure no development of blood clots from  stopping Eliquis.  - Continued monitoring of anticoagulation status and effectiveness.  - Follow-up with cardiologist as needed.    6. Chronic obstructive pulmonary disease (COPD).  - Switched from Anoro to Breztri, taking 2 puffs in the morning and 2 at night.  - Effectiveness of new medication to be evaluated in the follow-up appointment in about a month.  - Monitoring of respiratory symptoms and medication adherence.  - Follow-up with pulmonologist as needed.    7. Prostate cancer.  - Scheduled to receive the last testosterone injection for prostate cancer on Friday.  - Continued monitoring of cancer status and treatment effectiveness.  - Follow-up with oncology as needed.    8. Peripheral vascular disease.  - Referred for symptomatic varicose veins causing skin changes, leg fatigue, heaviness, swelling, cramping, and leg pain, affecting ability to walk, exercise, clean, and climb stairs.  - Conservative therapy for venous insufficiency, including compression, recommended with a follow-up after 3 months to assess the need for more aggressive treatment.  - Referral to Dr. Carbone made for further evaluation and treatment options.  - Ultrasound of the legs ordered to assess vascular health.    9. Tinnitus.  - Referral to an audiologist made for further evaluation and treatment options.  - Monitoring of symptoms and effectiveness of any interventions.  - Follow-up with audiologist as needed.    Follow-up  - Follow up in 6 months for a routine well visit.      Return in about 6 months (around 10/23/2025) for routine annual wellness exam.    Please be advised that this document was generated using voice recognition software. While I have made reasonable efforts to correct any obvious errors, there may still be grammatical or content inaccuracies that were not identified or corrected prior to finalization.

## 2025-04-24 ENCOUNTER — OFFICE VISIT (OUTPATIENT)
Dept: PHYSICAL MEDICINE AND REHAB | Facility: MEDICAL CENTER | Age: 60
End: 2025-04-24
Payer: MEDICAID

## 2025-04-24 VITALS
SYSTOLIC BLOOD PRESSURE: 119 MMHG | HEART RATE: 86 BPM | HEIGHT: 67 IN | WEIGHT: 248 LBS | OXYGEN SATURATION: 96 % | TEMPERATURE: 97.2 F | DIASTOLIC BLOOD PRESSURE: 72 MMHG | BODY MASS INDEX: 38.92 KG/M2

## 2025-04-24 DIAGNOSIS — G89.29 CHRONIC BILATERAL LOW BACK PAIN WITH RIGHT-SIDED SCIATICA: ICD-10-CM

## 2025-04-24 DIAGNOSIS — G56.03 BILATERAL CARPAL TUNNEL SYNDROME: ICD-10-CM

## 2025-04-24 DIAGNOSIS — M48.02 FORAMINAL STENOSIS OF CERVICAL REGION: ICD-10-CM

## 2025-04-24 DIAGNOSIS — M47.812 SPONDYLOSIS OF CERVICAL SPINE: ICD-10-CM

## 2025-04-24 DIAGNOSIS — M47.816 SPONDYLOSIS OF LUMBAR SPINE: ICD-10-CM

## 2025-04-24 DIAGNOSIS — G89.29 CHRONIC NECK PAIN: ICD-10-CM

## 2025-04-24 DIAGNOSIS — M54.2 CHRONIC NECK PAIN: ICD-10-CM

## 2025-04-24 DIAGNOSIS — M54.41 CHRONIC BILATERAL LOW BACK PAIN WITH RIGHT-SIDED SCIATICA: ICD-10-CM

## 2025-04-24 LAB — D DIMER PPP IA.FEU-MCNC: <0.27 UG/ML (FEU) (ref 0–0.5)

## 2025-04-24 PROCEDURE — 99214 OFFICE O/P EST MOD 30 MIN: CPT | Performed by: STUDENT IN AN ORGANIZED HEALTH CARE EDUCATION/TRAINING PROGRAM

## 2025-04-24 PROCEDURE — 1125F AMNT PAIN NOTED PAIN PRSNT: CPT | Performed by: STUDENT IN AN ORGANIZED HEALTH CARE EDUCATION/TRAINING PROGRAM

## 2025-04-24 PROCEDURE — 3078F DIAST BP <80 MM HG: CPT | Performed by: STUDENT IN AN ORGANIZED HEALTH CARE EDUCATION/TRAINING PROGRAM

## 2025-04-24 PROCEDURE — 3074F SYST BP LT 130 MM HG: CPT | Performed by: STUDENT IN AN ORGANIZED HEALTH CARE EDUCATION/TRAINING PROGRAM

## 2025-04-24 ASSESSMENT — PATIENT HEALTH QUESTIONNAIRE - PHQ9
5. POOR APPETITE OR OVEREATING: 1 - SEVERAL DAYS
CLINICAL INTERPRETATION OF PHQ2 SCORE: 3
SUM OF ALL RESPONSES TO PHQ QUESTIONS 1-9: 10

## 2025-04-24 ASSESSMENT — FIBROSIS 4 INDEX: FIB4 SCORE: 1.16

## 2025-04-24 ASSESSMENT — PAIN SCALES - GENERAL: PAINLEVEL_OUTOF10: 7=MODERATE-SEVERE PAIN

## 2025-04-24 NOTE — PROGRESS NOTES
[facility] (Physical Medicine and Rehabilitation)  Sports Medicine& Interventional Spine   Follow Up Patient Visit      Chief complaint:   Chief Complaint   Patient presents with    Follow-Up     SP          HISTORY        HPI  Patient identification: Ta Stokes ,  1965,   With Diagnoses of Spondylosis of cervical spine, Chronic neck pain, Foraminal stenosis of cervical region, Bilateral carpal tunnel syndrome, Spondylosis of lumbar spine, and Chronic bilateral low back pain with right-sided sciatica were pertinent to this visit.       At last visit dated 3/13/25  Ta Stokes  1965 male presents today for repeat evaluation.  Previous workup has included bilateral upper extremity EMG which did show concern for left-sided radiculopathy as well as bilateral carpal tunnel syndrome.Repeat evaluation patient reports improvement in his symptoms of bilateral hands as well as improvement in symptoms of the left neck and radiation of pain into the left upper extremity.  He was previously recommended to undergo a cervical interlaminar epidural steroid injection, however this was canceled due to patient being almost completely asymptomatic.  His primary complaint today is related to continued low back pain that is predominantly axial in nature.  On examination patient however did have mild weakness with hip flexion knee extension and ankle dorsiflexion potentially concerning for radiculopathy of the right side.  An MRI of the lumbar spine has been ordered for further evaluation.  Recommend patient follow-up after this is completed to discuss continued management and treatment options.         Previous Workup:  EMG Examination Date: 2/3/2025      Impression:       This is abnormal study  There is electrodiagnostic evidence for an acute and chronic left C5-C6 radiculopathy  There is electrodiagnostic evidence for bilateral median mononeuropathy at the wrist, i.e. carpal tunnel syndrome, that  is moderate on the right and mild on the left.  There is potential concern for a left ulnar mononeuropathy at the elbow i.e. cubital tunnel syndrome however this may have been technical error as there were bilateral below elbow stimulations showed minimal response.  Can consider a repeat EMG in 3 months to further evaluate this finding    Interval History:  History of Present Illness    The patient is a 60-year-old male who presents today for repeat evaluation of chronic low back pain. Pain today is reported to be 6-7 out of 10. He recently underwent diagnostic medial branch blocks targeting bilateral L3-L4, L4-L5 and L5-S1 facet joints.    He reports that his back pain was initially manageable post-procedure, but as the day progressed, he experienced an earlier onset of pain during activities. Prior to the procedure, his pain levels were significantly high, ranging from 5 to 7 out of 10, which would escalate as the day advanced. Post-procedure, he experienced complete relief from deep pain for approximately 2 to 3 hours. However, after this period, he began to feel the site of injection more acutely than the back pain itself. He also reports a sensation of the pain descending into his arm. He has been advised to discontinue Eliquis today.    Additionally, he has noticed an increase in the frequency of numbness in his hand. He suspects that he may have slept in an incorrect position on his pillow, resulting in discomfort when turning his head to the right.    MEDICATIONS  Current: Eliquis            PMHx:   Past Medical History:   Diagnosis Date    Arrhythmia     A-fib    Cancer (HCC) 01/2024    prostate cancer    Dental disorder     upper/lower dentures    Dyslipidemia     GERD (gastroesophageal reflux disease)     Heart burn     High cholesterol     Hypertension     Indigestion     Sleep apnea     Snoring        PSHx:   Past Surgical History:   Procedure Laterality Date    LUMBAR MEDIAL BRANCH BLOCKS Bilateral  4/16/2025    Procedure: Diagnostic medial branch blocks targeting the BILATERAL L3-4, L4-5, and L5-S1 facet joints with fluoroscopic guidance;  Surgeon: Satya Ponce M.D.;  [facility];  Service: Pain Management    EMG/NCS  2/3/2025       Family history   Family History   Problem Relation Age of Onset    Hypertension Mother     Cancer Maternal Grandmother          Medications:   Outpatient Medications Marked as Taking for the 4/24/25 encounter (Office Visit) with Mark Whitley D.O.   Medication Sig Dispense Refill    Budeson-Glycopyrrol-Formoterol (BREZTRI AEROSPHERE) 160-9-4.8 MCG/ACT Aerosol Inhale.      gabapentin (NEURONTIN) 100 MG Cap Take 1-3 Capsules by mouth at bedtime as needed (pain). 90 Capsule 3    diclofenac DR (VOLTAREN) 75 MG Tablet Delayed Response Take 1 tablet by mouth twice daily 60 Tablet 0    bisoprolol (ZEBETA) 5 MG Tab Take 1 Tablet by mouth every day. 90 Tablet 3    Empagliflozin (JARDIANCE) 10 MG Tab tablet Take 1 Tablet by mouth every day. 90 Tablet 3    atorvastatin (LIPITOR) 10 MG Tab Take 1 Tablet by mouth every evening. 90 Tablet 3    lisinopril (PRINIVIL) 5 MG Tab Take 1 tablet by mouth once daily 90 Tablet 3    apixaban (ELIQUIS) 5mg Tab Take 1 Tablet by mouth 2 times a day. 60 Tablet 11    tamsulosin (FLOMAX) 0.4 MG capsule Take 1 Capsule by mouth at bedtime.          Current Outpatient Medications on File Prior to Visit   Medication Sig Dispense Refill    Budeson-Glycopyrrol-Formoterol (BREZTRI AEROSPHERE) 160-9-4.8 MCG/ACT Aerosol Inhale.      gabapentin (NEURONTIN) 100 MG Cap Take 1-3 Capsules by mouth at bedtime as needed (pain). 90 Capsule 3    diclofenac DR (VOLTAREN) 75 MG Tablet Delayed Response Take 1 tablet by mouth twice daily 60 Tablet 0    bisoprolol (ZEBETA) 5 MG Tab Take 1 Tablet by mouth every day. 90 Tablet 3    Empagliflozin (JARDIANCE) 10 MG Tab tablet Take 1 Tablet by mouth every day. 90 Tablet 3    atorvastatin (LIPITOR) 10 MG Tab  Take 1 Tablet by mouth every evening. 90 Tablet 3    lisinopril (PRINIVIL) 5 MG Tab Take 1 tablet by mouth once daily 90 Tablet 3    apixaban (ELIQUIS) 5mg Tab Take 1 Tablet by mouth 2 times a day. 60 Tablet 11    tamsulosin (FLOMAX) 0.4 MG capsule Take 1 Capsule by mouth at bedtime.       No current facility-administered medications on file prior to visit.         Allergies:   Allergies   Allergen Reactions    Metformin Diarrhea     diarrhea       Social Hx:   Social History     Socioeconomic History    Marital status: Single     Spouse name: Not on file    Number of children: Not on file    Years of education: Not on file    Highest education level: Associate degree: occupational, technical, or vocational program   Occupational History    Not on file   Tobacco Use    Smoking status: Former     Current packs/day: 0.00     Average packs/day: 1 pack/day for 39.0 years (39.0 ttl pk-yrs)     Types: Cigarettes     Start date: 1979     Quit date: 2018     Years since quittin.3    Smokeless tobacco: Never    Tobacco comments:     1ppd   Vaping Use    Vaping status: Never Used   Substance and Sexual Activity    Alcohol use: Not Currently     Comment: once in awhile, 1 beer    Drug use: No    Sexual activity: Not Currently     Partners: Female   Other Topics Concern    Not on file   Social History Narrative    Not on file     Social Drivers of Health     Financial Resource Strain: High Risk (3/22/2023)    Overall Financial Resource Strain (CARDIA)     Difficulty of Paying Living Expenses: Hard   Food Insecurity: Food Insecurity Present (3/22/2023)    Hunger Vital Sign     Worried About Running Out of Food in the Last Year: Sometimes true     Ran Out of Food in the Last Year: Never true   Transportation Needs: No Transportation Needs (3/22/2023)    PRAPARE - Transportation     Lack of Transportation (Medical): No     Lack of Transportation (Non-Medical): No   Physical Activity: Sufficiently Active (3/22/2023)     "Exercise Vital Sign     Days of Exercise per Week: 7 days     Minutes of Exercise per Session: 150+ min   Stress: Stress Concern Present (3/22/2023)    [place] [employer] - Occupational Stress Questionnaire     Feeling of Stress : To some extent   Social Connections: Moderately Isolated (3/22/2023)    Social Connection and Isolation Panel [NHANES]     Frequency of Communication with Friends and Family: More than three times a week     Frequency of Social Gatherings with Friends and Family: More than three times a week     Attends Mu-ism Services: Never     Active Member of Clubs or Organizations: No     Attends Club or Organization Meetings: Never     Marital Status: Living with partner   Intimate Partner Violence: Not on file   Housing Stability: Low Risk  (3/22/2023)    Housing Stability Vital Sign     Unable to Pay for Housing in the Last Year: No     Number of Places Lived in the Last Year: 1     Unstable Housing in the Last Year: No         EXAMINATION     Physical Exam:   Vitals: /72 (BP Location: Right arm, Patient Position: Sitting, BP Cuff Size: Adult)   Pulse 86   Temp 36.2 °C (97.2 °F) (Temporal)   Ht 1.702 m (5' 7\")   Wt 112 kg (248 lb)   SpO2 96%     Constitutional:   Body Habitus: Body mass index is 38.84 kg/m².  Cooperation: Fully cooperates with exam  Appearance: Well-groomed no disheveled    Respiratory-  breathing comfortable on room air, no audible wheezing  Cardiovascular- capillary refills less than 2 seconds. No lower extremity edema is noted.   Psychiatric- alert and oriented ×3. Normal affect.    MSK and Neuro:   On examination patient demonstrates decreased range of motion in lumbar flexion and extension, with significant pain during bilateral facet loading.  Strength testing reveals mild weakness in hip flexion, knee extension, ankle dorsiflexion, and ED LH extension on the right side.  He has 5 out of 5 strength in all musculature on the left " side.    Inspection: No evidence of atrophy in bilateral lower extremities throughout   ROM: decreased AROM with flexion, extension, lateral flexion, and rotation bilaterally, with pain   Palpation:   No tenderness to palpation in midline at T1-T12 levels. No tenderness to palpation in the left and right of the midline T1-L5  palpation over SI joint: negative bilaterally    palpation over buttock: negative bilaterally    palpation in hip or over the greater trochanters: negative bilaterally      Lumbar spine Special tests  Neuro tension  Straight leg test positive right, negative left    Slump test positive right, negative left      HIP  FAIR test negative bilaterally    Range of motion in the hips is within normal limits in flexion, extension, abduction, internal rotation, external rotation.    SI joint tests  Observation patient sits on one buttocks: Negative  SI joint compression negative bilaterally    SI joint distraction negative bilaterally    Thigh thrust test negative bilaterally    BRAYAN test negative bilaterally     Neuro   Key points for the international standards for neurological classification of spinal cord injury (ISNCSCI) to light touch.   Dermatome R L   L2 2 2   L3 2 2   L4 2 2   L5 2 2   S1 2 2   S2 2 2     Motor Exam Lower Extremities  ? Myotome R L   Hip flexion L2 4 5   Knee extension L3 4 5   Ankle dorsiflexion L4 4 5   Toe extension L5 5 5   Ankle plantarflexion S1 5 5       Babinski sign negative bilaterally   Clonus of the ankle negative bilaterally     Reflexes  ?  R L   Patella  2+ 2+   Achilles   2+ 2+           MEDICAL DECISION MAKING    DATA    Labs:   Lab Results   Component Value Date/Time    SODIUM 141 03/05/2025 02:59 PM    POTASSIUM 4.9 03/05/2025 02:59 PM    CHLORIDE 110 03/05/2025 02:59 PM    CO2 21 03/05/2025 02:59 PM    GLUCOSE 121 (H) 03/05/2025 02:59 PM    BUN 16 03/05/2025 02:59 PM    CREATININE 0.91 03/05/2025 02:59 PM        Lab Results   Component Value Date/Time     PROTHROMBTM 13.2 02/01/2019 07:07 PM    INR 0.99 02/01/2019 07:07 PM        Lab Results   Component Value Date/Time    WBC 5.7 01/14/2025 10:00 AM    RBC 5.23 01/14/2025 10:00 AM    HEMOGLOBIN 16.3 01/14/2025 10:00 AM    HEMATOCRIT 49.0 01/14/2025 10:00 AM    MCV 93.7 01/14/2025 10:00 AM    MCH 31.2 01/14/2025 10:00 AM    MCHC 33.3 01/14/2025 10:00 AM    MPV 9.4 01/14/2025 10:00 AM    NEUTSPOLYS 71.30 01/14/2025 10:00 AM    LYMPHOCYTES 13.30 (L) 01/14/2025 10:00 AM    MONOCYTES 8.90 01/14/2025 10:00 AM    EOSINOPHILS 5.10 01/14/2025 10:00 AM    BASOPHILS 0.50 01/14/2025 10:00 AM        Lab Results   Component Value Date/Time    HBA1C 6.6 (A) 01/22/2025 02:56 PM          Imaging:   I personally reviewed following images    Results  Imaging  MRI of the lumbar spine shows mild narrowing of the thecal sac at L3, L4, L4-L5 secondary to epidural lipomatosis. And diffuse lumbar facet arthropathy       MRI of the cervical spine dated 12/16/2024 showed severe right foraminal narrowing at C4-C5 and bilateral foraminal narrowing at C5-C6.         I reviewed the following radiology reports                   Results for orders placed during the hospital encounter of 01/14/25    MR-CERVICAL SPINE-W/O    Impression  Stable appearance of moderate canal narrowing at C4-5 and C5-6.    Stable appearance of severe right foraminal narrowing at C4-5.    Stable appearance of bilateral moderate foraminal narrowing at C5-6.    Results for orders placed during the hospital encounter of 12/16/24    MR-CERVICAL SPINE-WITH & W/O    Impression  1.  Moderate canal narrowing at C4-5 and C5-6.    2.  Severe right foraminal narrowing at C4-5.    3.  Moderate bilateral foraminal narrowing at C5-6.                                                                               Results for orders placed during the hospital encounter of 02/01/19    CT-ABDOMEN-PELVIS WITH    Impression  1.  Asymmetric thickening of the right lower anterior intercostal  muscles with a small amount of fluid seen extending between the deep and superficial layers of the right lower chest and anterior lateral abdominal wall muscles likely representing  hematoma. A definite associated rib fracture is not seen.    2.  Fatty liver.    3.  Small left lobe hepatic cyst or hemangioma.    4.  No evidence of bowel obstruction or focal inflammatory change within the abdomen or pelvis.                     Results for orders placed in visit on 01/21/25    DX-CERVICAL SPINE-4+ VIEWS                                   Results for orders placed during the hospital encounter of 01/14/25    DX-SHOULDER 2+ LEFT    Impression  No acute osseous abnormality.              DIAGNOSIS   Visit Diagnoses     ICD-10-CM   1. Spondylosis of cervical spine  M47.812   2. Chronic neck pain  M54.2    G89.29   3. Foraminal stenosis of cervical region  M48.02   4. Bilateral carpal tunnel syndrome  G56.03   5. Spondylosis of lumbar spine  M47.816   6. Chronic bilateral low back pain with right-sided sciatica  G89.29    M54.41               ASSESSMENT and PLAN:       Assessment & Plan  1. Chronic low back pain.  The patient reports chronic low back pain with a severity of 5-6 out of 10, which has worsened since the last visit. MRI of the lumbar spine shows arthritis in the lower segments and atrophy of the paraspinal muscles. Physical therapy is recommended to strengthen these muscles. Injections into the low back to numb the nerves of the joints are suggested to alleviate pain. If medial branch block injections provide short-term relief, an ablation procedure may be considered for long-term relief.     The patient is failed conservative treatments with medication management, home exercise program from the direction of physical therapy/physician including the past 8 weeks .  I have ordered a BILATERAL diagnostic medial branch block targeting the L3-4, L4-5, and L5-S1 facet joints #1 and #2.  Procedure #2 is only to be done  if #1 is a positive block.    The risks benefits and alternatives to this procedure were discussed and the patient wishes to proceed with the procedure. Risks include but are not limited to damage to surrounding structures, infection, bleeding, worsening of pain which can be permanent, weakness which can be permanent. Benefits include pain relief, improved function. Alternatives includes not doing the procedure.   If there are 2 positive blocks I would consider the patient for radiofrequency neurotomy of the previously blocked nerves.       2. Bilateral carpal tunnel syndrome.  The patient has moderate carpal tunnel syndrome on the right and mild on the left. He reports that his current wrist splints are no longer effective, and he experiences numbness and pain at night. Continued use of bracing is recommended, and new braces are suggested if the current ones are not helping. Potential carpal tunnel injections will be considered at follow-up.        Follow-up  The patient will follow up in 2 weeks.          Dieudonne was seen today for follow-up.    Diagnoses and all orders for this visit:    Spondylosis of cervical spine    Chronic neck pain    Foraminal stenosis of cervical region    Bilateral carpal tunnel syndrome    Spondylosis of lumbar spine    Chronic bilateral low back pain with right-sided sciatica                  Follow up: after above procedures    Thank you for allowing me to participate in the care of this patient. If you have any questions please not hesitate to contact me.             Please note that this dictation was created using voice recognition software. I have made every reasonable attempt to correct obvious errors but there may be errors of grammar and content that I may have overlooked prior to finalization of this note.    Mark Whitley DO  Physical Medicine and Rehabilitation  Sports Medicine and Spine  [facility]

## 2025-04-24 NOTE — H&P (VIEW-ONLY)
[facility] (Physical Medicine and Rehabilitation)  Sports Medicine& Interventional Spine   Follow Up Patient Visit      Chief complaint:   Chief Complaint   Patient presents with    Follow-Up     SP          HISTORY        HPI  Patient identification: Ta Stokes ,  1965,   With Diagnoses of Spondylosis of cervical spine, Chronic neck pain, Foraminal stenosis of cervical region, Bilateral carpal tunnel syndrome, Spondylosis of lumbar spine, and Chronic bilateral low back pain with right-sided sciatica were pertinent to this visit.       At last visit dated 3/13/25  Ta Stokes  1965 male presents today for repeat evaluation.  Previous workup has included bilateral upper extremity EMG which did show concern for left-sided radiculopathy as well as bilateral carpal tunnel syndrome.Repeat evaluation patient reports improvement in his symptoms of bilateral hands as well as improvement in symptoms of the left neck and radiation of pain into the left upper extremity.  He was previously recommended to undergo a cervical interlaminar epidural steroid injection, however this was canceled due to patient being almost completely asymptomatic.  His primary complaint today is related to continued low back pain that is predominantly axial in nature.  On examination patient however did have mild weakness with hip flexion knee extension and ankle dorsiflexion potentially concerning for radiculopathy of the right side.  An MRI of the lumbar spine has been ordered for further evaluation.  Recommend patient follow-up after this is completed to discuss continued management and treatment options.         Previous Workup:  EMG Examination Date: 2/3/2025      Impression:       This is abnormal study  There is electrodiagnostic evidence for an acute and chronic left C5-C6 radiculopathy  There is electrodiagnostic evidence for bilateral median mononeuropathy at the wrist, i.e. carpal tunnel syndrome, that  is moderate on the right and mild on the left.  There is potential concern for a left ulnar mononeuropathy at the elbow i.e. cubital tunnel syndrome however this may have been technical error as there were bilateral below elbow stimulations showed minimal response.  Can consider a repeat EMG in 3 months to further evaluate this finding    Interval History:  History of Present Illness    The patient is a 60-year-old male who presents today for repeat evaluation of chronic low back pain. Pain today is reported to be 6-7 out of 10. He recently underwent diagnostic medial branch blocks targeting bilateral L3-L4, L4-L5 and L5-S1 facet joints.    He reports that his back pain was initially manageable post-procedure, but as the day progressed, he experienced an earlier onset of pain during activities. Prior to the procedure, his pain levels were significantly high, ranging from 5 to 7 out of 10, which would escalate as the day advanced. Post-procedure, he experienced complete relief from deep pain for approximately 2 to 3 hours. However, after this period, he began to feel the site of injection more acutely than the back pain itself. He also reports a sensation of the pain descending into his arm. He has been advised to discontinue Eliquis today.    Additionally, he has noticed an increase in the frequency of numbness in his hand. He suspects that he may have slept in an incorrect position on his pillow, resulting in discomfort when turning his head to the right.    MEDICATIONS  Current: Eliquis            PMHx:   Past Medical History:   Diagnosis Date    Arrhythmia     A-fib    Cancer (HCC) 01/2024    prostate cancer    Dental disorder     upper/lower dentures    Dyslipidemia     GERD (gastroesophageal reflux disease)     Heart burn     High cholesterol     Hypertension     Indigestion     Sleep apnea     Snoring        PSHx:   Past Surgical History:   Procedure Laterality Date    LUMBAR MEDIAL BRANCH BLOCKS Bilateral  4/16/2025    Procedure: Diagnostic medial branch blocks targeting the BILATERAL L3-4, L4-5, and L5-S1 facet joints with fluoroscopic guidance;  Surgeon: Satya Ponce M.D.;  [facility];  Service: Pain Management    EMG/NCS  2/3/2025       Family history   Family History   Problem Relation Age of Onset    Hypertension Mother     Cancer Maternal Grandmother          Medications:   Outpatient Medications Marked as Taking for the 4/24/25 encounter (Office Visit) with Mark Whitely D.O.   Medication Sig Dispense Refill    Budeson-Glycopyrrol-Formoterol (BREZTRI AEROSPHERE) 160-9-4.8 MCG/ACT Aerosol Inhale.      gabapentin (NEURONTIN) 100 MG Cap Take 1-3 Capsules by mouth at bedtime as needed (pain). 90 Capsule 3    diclofenac DR (VOLTAREN) 75 MG Tablet Delayed Response Take 1 tablet by mouth twice daily 60 Tablet 0    bisoprolol (ZEBETA) 5 MG Tab Take 1 Tablet by mouth every day. 90 Tablet 3    Empagliflozin (JARDIANCE) 10 MG Tab tablet Take 1 Tablet by mouth every day. 90 Tablet 3    atorvastatin (LIPITOR) 10 MG Tab Take 1 Tablet by mouth every evening. 90 Tablet 3    lisinopril (PRINIVIL) 5 MG Tab Take 1 tablet by mouth once daily 90 Tablet 3    apixaban (ELIQUIS) 5mg Tab Take 1 Tablet by mouth 2 times a day. 60 Tablet 11    tamsulosin (FLOMAX) 0.4 MG capsule Take 1 Capsule by mouth at bedtime.          Current Outpatient Medications on File Prior to Visit   Medication Sig Dispense Refill    Budeson-Glycopyrrol-Formoterol (BREZTRI AEROSPHERE) 160-9-4.8 MCG/ACT Aerosol Inhale.      gabapentin (NEURONTIN) 100 MG Cap Take 1-3 Capsules by mouth at bedtime as needed (pain). 90 Capsule 3    diclofenac DR (VOLTAREN) 75 MG Tablet Delayed Response Take 1 tablet by mouth twice daily 60 Tablet 0    bisoprolol (ZEBETA) 5 MG Tab Take 1 Tablet by mouth every day. 90 Tablet 3    Empagliflozin (JARDIANCE) 10 MG Tab tablet Take 1 Tablet by mouth every day. 90 Tablet 3    atorvastatin (LIPITOR) 10 MG Tab  Take 1 Tablet by mouth every evening. 90 Tablet 3    lisinopril (PRINIVIL) 5 MG Tab Take 1 tablet by mouth once daily 90 Tablet 3    apixaban (ELIQUIS) 5mg Tab Take 1 Tablet by mouth 2 times a day. 60 Tablet 11    tamsulosin (FLOMAX) 0.4 MG capsule Take 1 Capsule by mouth at bedtime.       No current facility-administered medications on file prior to visit.         Allergies:   Allergies   Allergen Reactions    Metformin Diarrhea     diarrhea       Social Hx:   Social History     Socioeconomic History    Marital status: Single     Spouse name: Not on file    Number of children: Not on file    Years of education: Not on file    Highest education level: Associate degree: occupational, technical, or vocational program   Occupational History    Not on file   Tobacco Use    Smoking status: Former     Current packs/day: 0.00     Average packs/day: 1 pack/day for 39.0 years (39.0 ttl pk-yrs)     Types: Cigarettes     Start date: 1979     Quit date: 2018     Years since quittin.3    Smokeless tobacco: Never    Tobacco comments:     1ppd   Vaping Use    Vaping status: Never Used   Substance and Sexual Activity    Alcohol use: Not Currently     Comment: once in awhile, 1 beer    Drug use: No    Sexual activity: Not Currently     Partners: Female   Other Topics Concern    Not on file   Social History Narrative    Not on file     Social Drivers of Health     Financial Resource Strain: High Risk (3/22/2023)    Overall Financial Resource Strain (CARDIA)     Difficulty of Paying Living Expenses: Hard   Food Insecurity: Food Insecurity Present (3/22/2023)    Hunger Vital Sign     Worried About Running Out of Food in the Last Year: Sometimes true     Ran Out of Food in the Last Year: Never true   Transportation Needs: No Transportation Needs (3/22/2023)    PRAPARE - Transportation     Lack of Transportation (Medical): No     Lack of Transportation (Non-Medical): No   Physical Activity: Sufficiently Active (3/22/2023)     "Exercise Vital Sign     Days of Exercise per Week: 7 days     Minutes of Exercise per Session: 150+ min   Stress: Stress Concern Present (3/22/2023)    [place] [employer] - Occupational Stress Questionnaire     Feeling of Stress : To some extent   Social Connections: Moderately Isolated (3/22/2023)    Social Connection and Isolation Panel [NHANES]     Frequency of Communication with Friends and Family: More than three times a week     Frequency of Social Gatherings with Friends and Family: More than three times a week     Attends Protestant Services: Never     Active Member of Clubs or Organizations: No     Attends Club or Organization Meetings: Never     Marital Status: Living with partner   Intimate Partner Violence: Not on file   Housing Stability: Low Risk  (3/22/2023)    Housing Stability Vital Sign     Unable to Pay for Housing in the Last Year: No     Number of Places Lived in the Last Year: 1     Unstable Housing in the Last Year: No         EXAMINATION     Physical Exam:   Vitals: /72 (BP Location: Right arm, Patient Position: Sitting, BP Cuff Size: Adult)   Pulse 86   Temp 36.2 °C (97.2 °F) (Temporal)   Ht 1.702 m (5' 7\")   Wt 112 kg (248 lb)   SpO2 96%     Constitutional:   Body Habitus: Body mass index is 38.84 kg/m².  Cooperation: Fully cooperates with exam  Appearance: Well-groomed no disheveled    Respiratory-  breathing comfortable on room air, no audible wheezing  Cardiovascular- capillary refills less than 2 seconds. No lower extremity edema is noted.   Psychiatric- alert and oriented ×3. Normal affect.    MSK and Neuro:   On examination patient demonstrates decreased range of motion in lumbar flexion and extension, with significant pain during bilateral facet loading.  Strength testing reveals mild weakness in hip flexion, knee extension, ankle dorsiflexion, and ED LH extension on the right side.  He has 5 out of 5 strength in all musculature on the left " side.    Inspection: No evidence of atrophy in bilateral lower extremities throughout   ROM: decreased AROM with flexion, extension, lateral flexion, and rotation bilaterally, with pain   Palpation:   No tenderness to palpation in midline at T1-T12 levels. No tenderness to palpation in the left and right of the midline T1-L5  palpation over SI joint: negative bilaterally    palpation over buttock: negative bilaterally    palpation in hip or over the greater trochanters: negative bilaterally      Lumbar spine Special tests  Neuro tension  Straight leg test positive right, negative left    Slump test positive right, negative left      HIP  FAIR test negative bilaterally    Range of motion in the hips is within normal limits in flexion, extension, abduction, internal rotation, external rotation.    SI joint tests  Observation patient sits on one buttocks: Negative  SI joint compression negative bilaterally    SI joint distraction negative bilaterally    Thigh thrust test negative bilaterally    BRAYAN test negative bilaterally     Neuro   Key points for the international standards for neurological classification of spinal cord injury (ISNCSCI) to light touch.   Dermatome R L   L2 2 2   L3 2 2   L4 2 2   L5 2 2   S1 2 2   S2 2 2     Motor Exam Lower Extremities  ? Myotome R L   Hip flexion L2 4 5   Knee extension L3 4 5   Ankle dorsiflexion L4 4 5   Toe extension L5 5 5   Ankle plantarflexion S1 5 5       Babinski sign negative bilaterally   Clonus of the ankle negative bilaterally     Reflexes  ?  R L   Patella  2+ 2+   Achilles   2+ 2+           MEDICAL DECISION MAKING    DATA    Labs:   Lab Results   Component Value Date/Time    SODIUM 141 03/05/2025 02:59 PM    POTASSIUM 4.9 03/05/2025 02:59 PM    CHLORIDE 110 03/05/2025 02:59 PM    CO2 21 03/05/2025 02:59 PM    GLUCOSE 121 (H) 03/05/2025 02:59 PM    BUN 16 03/05/2025 02:59 PM    CREATININE 0.91 03/05/2025 02:59 PM        Lab Results   Component Value Date/Time     PROTHROMBTM 13.2 02/01/2019 07:07 PM    INR 0.99 02/01/2019 07:07 PM        Lab Results   Component Value Date/Time    WBC 5.7 01/14/2025 10:00 AM    RBC 5.23 01/14/2025 10:00 AM    HEMOGLOBIN 16.3 01/14/2025 10:00 AM    HEMATOCRIT 49.0 01/14/2025 10:00 AM    MCV 93.7 01/14/2025 10:00 AM    MCH 31.2 01/14/2025 10:00 AM    MCHC 33.3 01/14/2025 10:00 AM    MPV 9.4 01/14/2025 10:00 AM    NEUTSPOLYS 71.30 01/14/2025 10:00 AM    LYMPHOCYTES 13.30 (L) 01/14/2025 10:00 AM    MONOCYTES 8.90 01/14/2025 10:00 AM    EOSINOPHILS 5.10 01/14/2025 10:00 AM    BASOPHILS 0.50 01/14/2025 10:00 AM        Lab Results   Component Value Date/Time    HBA1C 6.6 (A) 01/22/2025 02:56 PM          Imaging:   I personally reviewed following images    Results  Imaging  MRI of the lumbar spine shows mild narrowing of the thecal sac at L3, L4, L4-L5 secondary to epidural lipomatosis. And diffuse lumbar facet arthropathy       MRI of the cervical spine dated 12/16/2024 showed severe right foraminal narrowing at C4-C5 and bilateral foraminal narrowing at C5-C6.         I reviewed the following radiology reports                   Results for orders placed during the hospital encounter of 01/14/25    MR-CERVICAL SPINE-W/O    Impression  Stable appearance of moderate canal narrowing at C4-5 and C5-6.    Stable appearance of severe right foraminal narrowing at C4-5.    Stable appearance of bilateral moderate foraminal narrowing at C5-6.    Results for orders placed during the hospital encounter of 12/16/24    MR-CERVICAL SPINE-WITH & W/O    Impression  1.  Moderate canal narrowing at C4-5 and C5-6.    2.  Severe right foraminal narrowing at C4-5.    3.  Moderate bilateral foraminal narrowing at C5-6.                                                                               Results for orders placed during the hospital encounter of 02/01/19    CT-ABDOMEN-PELVIS WITH    Impression  1.  Asymmetric thickening of the right lower anterior intercostal  muscles with a small amount of fluid seen extending between the deep and superficial layers of the right lower chest and anterior lateral abdominal wall muscles likely representing  hematoma. A definite associated rib fracture is not seen.    2.  Fatty liver.    3.  Small left lobe hepatic cyst or hemangioma.    4.  No evidence of bowel obstruction or focal inflammatory change within the abdomen or pelvis.                     Results for orders placed in visit on 01/21/25    DX-CERVICAL SPINE-4+ VIEWS                                   Results for orders placed during the hospital encounter of 01/14/25    DX-SHOULDER 2+ LEFT    Impression  No acute osseous abnormality.              DIAGNOSIS   Visit Diagnoses     ICD-10-CM   1. Spondylosis of cervical spine  M47.812   2. Chronic neck pain  M54.2    G89.29   3. Foraminal stenosis of cervical region  M48.02   4. Bilateral carpal tunnel syndrome  G56.03   5. Spondylosis of lumbar spine  M47.816   6. Chronic bilateral low back pain with right-sided sciatica  G89.29    M54.41               ASSESSMENT and PLAN:       Assessment & Plan  1. Chronic low back pain.  The patient reports chronic low back pain with a severity of 5-6 out of 10, which has worsened since the last visit. MRI of the lumbar spine shows arthritis in the lower segments and atrophy of the paraspinal muscles. Physical therapy is recommended to strengthen these muscles. Injections into the low back to numb the nerves of the joints are suggested to alleviate pain. If medial branch block injections provide short-term relief, an ablation procedure may be considered for long-term relief.     The patient is failed conservative treatments with medication management, home exercise program from the direction of physical therapy/physician including the past 8 weeks .  I have ordered a BILATERAL diagnostic medial branch block targeting the L3-4, L4-5, and L5-S1 facet joints #1 and #2.  Procedure #2 is only to be done  if #1 is a positive block.    The risks benefits and alternatives to this procedure were discussed and the patient wishes to proceed with the procedure. Risks include but are not limited to damage to surrounding structures, infection, bleeding, worsening of pain which can be permanent, weakness which can be permanent. Benefits include pain relief, improved function. Alternatives includes not doing the procedure.   If there are 2 positive blocks I would consider the patient for radiofrequency neurotomy of the previously blocked nerves.       2. Bilateral carpal tunnel syndrome.  The patient has moderate carpal tunnel syndrome on the right and mild on the left. He reports that his current wrist splints are no longer effective, and he experiences numbness and pain at night. Continued use of bracing is recommended, and new braces are suggested if the current ones are not helping. Potential carpal tunnel injections will be considered at follow-up.        Follow-up  The patient will follow up in 2 weeks.          Dieudonne was seen today for follow-up.    Diagnoses and all orders for this visit:    Spondylosis of cervical spine    Chronic neck pain    Foraminal stenosis of cervical region    Bilateral carpal tunnel syndrome    Spondylosis of lumbar spine    Chronic bilateral low back pain with right-sided sciatica                  Follow up: after above procedures    Thank you for allowing me to participate in the care of this patient. If you have any questions please not hesitate to contact me.             Please note that this dictation was created using voice recognition software. I have made every reasonable attempt to correct obvious errors but there may be errors of grammar and content that I may have overlooked prior to finalization of this note.    Mark Whitley DO  Physical Medicine and Rehabilitation  Sports Medicine and Spine  [facility]

## 2025-04-24 NOTE — PATIENT INSTRUCTIONS
"[facility]  [address]  [place] [address]  Phone: [phone]     Patient Care Coordination notes:  Referral Faxed, Ready to schedule.       REFERRAL INFORMATION    Referral #:  [id number]   Referred-To Provider     Referred-By Provider:   Otolaryngology    Natalya Ortega M.D.    [facility]       [address]  [address]  [facility] [id number] [phone] [address]  [facility] [address]  [phone]     Referral Start Date:  10/10/2024   Referral End Date:   10/10/2025                 SCHEDULING  If you do not already have an appointment, please call [phone] to make an appointment.      MORE INFORMATION  If you do not already have a [email or web address] account, sign up at: [email or web address]  You can access your medical information, make appointments, see lab results, billing information, and more.  If you have questions regarding this referral, please contact  the [facility] Referrals department at:             [phone]. Monday - Friday 8:00AM - 5:00PM.      Sincerely,     [facility] Dieudonne,    Referral has been placed for you. You will have to call to schedule an appointment with the location on the referral once it's been approved. If you do not hear from [facility]'s referral department within 7-10 days either through phone call or [email or web address] message, please contact them at [phone] for more information or to re-route to preferred location if possible.    You can also find your referral information from the \"Letters\" under the communication section from the main menu on [email or web address] found on the top left corner or center of your [email or web address] home page          Dr. Shannon Kee  Family Medicine Physician  [facility]          "

## 2025-04-29 ENCOUNTER — HOSPITAL ENCOUNTER (OUTPATIENT)
Facility: REHABILITATION | Age: 60
End: 2025-04-29
Attending: PHYSICAL MEDICINE & REHABILITATION | Admitting: PHYSICAL MEDICINE & REHABILITATION
Payer: MEDICAID

## 2025-04-29 ENCOUNTER — TELEPHONE (OUTPATIENT)
Dept: HEALTH INFORMATION MANAGEMENT | Facility: OTHER | Age: 60
End: 2025-04-29
Payer: MEDICAID

## 2025-04-29 ENCOUNTER — APPOINTMENT (OUTPATIENT)
Dept: RADIOLOGY | Facility: REHABILITATION | Age: 60
End: 2025-04-29
Attending: PHYSICAL MEDICINE & REHABILITATION
Payer: MEDICAID

## 2025-04-29 VITALS
HEART RATE: 82 BPM | OXYGEN SATURATION: 95 % | SYSTOLIC BLOOD PRESSURE: 104 MMHG | RESPIRATION RATE: 16 BRPM | WEIGHT: 244.71 LBS | TEMPERATURE: 98.2 F | DIASTOLIC BLOOD PRESSURE: 72 MMHG | HEIGHT: 67 IN | BODY MASS INDEX: 38.41 KG/M2

## 2025-04-29 PROCEDURE — 64495 INJ PARAVERT F JNT L/S 3 LEV: CPT | Mod: RT,PO,KX

## 2025-04-29 PROCEDURE — 64495 INJ PARAVERT F JNT L/S 3 LEV: CPT

## 2025-04-29 PROCEDURE — 64494 INJ PARAVERT F JNT L/S 2 LEV: CPT | Mod: RT,PO,KX

## 2025-04-29 PROCEDURE — 64494 INJ PARAVERT F JNT L/S 2 LEV: CPT

## 2025-04-29 PROCEDURE — 700117 HCHG RX CONTRAST REV CODE 255

## 2025-04-29 PROCEDURE — 700101 HCHG RX REV CODE 250

## 2025-04-29 PROCEDURE — 700111 HCHG RX REV CODE 636 W/ 250 OVERRIDE (IP): Mod: JZ

## 2025-04-29 PROCEDURE — 64493 INJ PARAVERT F JNT L/S 1 LEV: CPT

## 2025-04-29 RX ORDER — LIDOCAINE HYDROCHLORIDE 20 MG/ML
INJECTION, SOLUTION EPIDURAL; INFILTRATION; INTRACAUDAL; PERINEURAL
Status: COMPLETED
Start: 2025-04-29 | End: 2025-04-29

## 2025-04-29 RX ORDER — LIDOCAINE HYDROCHLORIDE 10 MG/ML
INJECTION, SOLUTION EPIDURAL; INFILTRATION; INTRACAUDAL; PERINEURAL
Status: COMPLETED
Start: 2025-04-29 | End: 2025-04-29

## 2025-04-29 RX ADMIN — IOHEXOL 5 ML: 240 INJECTION, SOLUTION INTRATHECAL; INTRAVASCULAR; INTRAVENOUS; ORAL at 10:55

## 2025-04-29 RX ADMIN — LIDOCAINE HYDROCHLORIDE 10 ML: 20 INJECTION, SOLUTION EPIDURAL; INFILTRATION; INTRACAUDAL; PERINEURAL at 10:55

## 2025-04-29 RX ADMIN — LIDOCAINE HYDROCHLORIDE 10 ML: 10 INJECTION, SOLUTION EPIDURAL; INFILTRATION; INTRACAUDAL; PERINEURAL at 10:54

## 2025-04-29 ASSESSMENT — PAIN DESCRIPTION - PAIN TYPE
TYPE: CHRONIC PAIN
TYPE: CHRONIC PAIN

## 2025-04-29 ASSESSMENT — FIBROSIS 4 INDEX: FIB4 SCORE: 1.16

## 2025-04-29 NOTE — OP REPORT
Date of Service: 4/29/2025       Patient: Ta Stokes 60 y.o. male     MRN: [id number]      Physician/s: Satya Ponce MD     Pre-operative Diagnosis: Lumbosacral spondylosis, facet arthropathy. The patient was NOT seen for lumbar radiculopathy today.      Post-operative Diagnosis: Lumbosacral spondylosis, facet arthropathy. The patient was NOT seen for lumbar radiculopathy today.      Procedure: Medial Branch Blocks targeting the bilateral  L3-4, L4-5, and L5-S1  facet joints      Description of procedure:     The risks, benefits, and alternatives of the procedure were reviewed and discussed with the patient.  Written informed consent was freely obtained. A pre-procedural time-out was conducted by the physician verifying patient’s identity, procedure to be performed, procedure site and side, and allergy verification. Appropriate equipment was determined to be in place for the procedure.            In the fluoroscopy suite the patient was placed in a prone position and the skin was prepped and draped in the usual sterile fashion. The fluoroscope was placed over the lower back at the appropriate angles, and the targets for injection were marked. A 27g needle was placed into each of the markings at the levels below, and approx 1mL of 1% Lidocaine was injected subcutaneously into the epidermal and dermal layers. The needle was removed intact.     Using an oblique view, A 22g 5 inch needle was then placed at the intersection of the transverse process and superior articular process at the L3-4 facet joint where the L2 medial branch runs on the right side. The needle tips were then verified by AP, oblique, and lateral views.      Using an oblique view, A 22g 5 inch needle was then placed at the intersection of the transverse process and superior articular process at the L4-5 facet joint where the L3 medial branch runs  on the right side. The needle tips were then verified by AP, oblique, and lateral views.      Using  an oblique view, A 22g 5 inch needle was then placed at the intersection of the transverse process and superior articular process at the L5-S1 facet joint where the L4 medial branch runs  on the right side. The needle tips were then verified by AP, oblique, and lateral views.      Using an oblique view, A 22g 5 inch needle was then placed at the intersection of the transverse process and superior articular process at the S1 facet where the L5 dorsal ramus runs  on the right side. The needle tips were then verified by AP, oblique, and lateral views.         Using an oblique view, A 22g 5 inch needle was then placed at the intersection of the transverse process and superior articular process at the L3-4 facet joint where the L2 medial branch runs  on the left side. The needle tips were then verified by AP, oblique, and lateral views.      Using an oblique view, A 22g 5 inch needle was then placed at the intersection of the transverse process and superior articular process at the L4-5 facet joint where the L3 medial branch runs  on the left side. The needle tips were then verified by AP, oblique, and lateral views.      Using an oblique view, A 22g 5 inch needle was then placed at the intersection of the transverse process and superior articular process at the L5-S1 facet joint where the L4 medial branch runs  on the left side. The needle tips were then verified by AP, oblique, and lateral views.      Using an oblique view, A 22g 5 inch needle was then placed at the intersection of the transverse process and superior articular process at the S1 facet where the L5 dorsal ramus runs  on the left side. The needle tips were then verified by AP, oblique, and lateral views.      In the AP view, less than 1mL of contrast dye was used to highlight the medial branch while the fluoroscope was running live at the levels above. Following negative aspiration, approximately 1mL of 2% lidocaine  was then injected at the above levels,  and the needles were removed intact after restyleted. The patient's back was covered with a 4x4 gauze, the area was cleansed with sterile normal saline, and a dressing was applied.      There were no complications noted, the patient was hemodynamically stable, and tolerated the procedure well.      This was a challenging procedure given the patients Body mass index is 38.33 kg/m².. This required longer needles.  A typical procedure such as this would require 25g 3.5 inch needles.  This procedure required 22g 5 inch needles.  This added to the mental effort for complexity this procedure.  This also made acquiring fluoroscopic images more time-consuming and challenging.  Final fluoroscopic images were obtained and saved.      The patient had 100 percent pain relief postprocedure  Preprocedure pain 6/10 NRS  Postprocedure pain 0 /10 NRS         Follow-up as scheduled        Satya Ponce MD  Physical Medicine and Rehabilitation  Interventional Spine and Sports Physiatry  [facility]              CPT  Intraarticular joint or medial branch block (MBB) - lumbar or sacral (1st level):  [id number]  Intraarticular joint or medial branch block (MBB) - lumbar or sacral (2nd level):  [id number]  Intraarticular joint or medial branch block (MBB) - lumbar or sacral (3rd level):  [id number]

## 2025-04-29 NOTE — OR SURGEON
Immediate Post OP Note    Pre-Op Diagnosis Codes:      * Lumbar spondylosis [M47.816]      Post-Op Diagnosis Codes:     * Lumbar spondylosis [M47.816]      Procedure(s):  Diagnostic medial branch blocks targeting the BILATERAL L3-4, L4-5, and L5-S1 facet joints with fluoroscopic guidance #2     - Wound Class: Clean    Surgeon(s):  Satya Ponce M.D.    Anesthesiologist/Type of Anesthesia:  No anesthesia staff entered./Local    Surgical Staff:  Circulator: Regla Gaytan R.N.  Scrub Person: Dana Paniagua  Radiology Technologist: Bernardino Painter    Specimens removed if any:  * No specimens in log *    Estimated Blood Loss: None    Findings: None    Complications: None        4/29/2025 11:10 AM Satya Ponce M.D.

## 2025-04-29 NOTE — PROGRESS NOTES
0950 Pt received to pre procedure area. ID band and allergies verified. Vital signs taken and stable. Verified that patient has not taken NSAIDS, anticoagulants or blood thinners in past 5 days. Pt's history reviewed. Reviewed post op instructions with patient, questions answered, verbalized understanding. Pt seen by Dr. Ponce  pre procedure discussed, questions answered. Pain diary given to patient with instructions, pt verbalized understanding.    1111  Pt received to recovery area, report received from procedure RN Regla . Vitals taken and stable. Patient tolerated po fluids and snack without difficulty. Dressing clean, dry and intact. Ice pack applied over dressing.     1125 Pt seen by Dr. Ponce post procedure, orders received for discharge. Patient ambulatory without difficulty. Pt discharged to designated .

## 2025-04-29 NOTE — INTERVAL H&P NOTE
The patient had 90% pain relief after the diagnostic medial branch blocks targeting the same levels during the diagnostic phase.  Preprocedure pain 7/10 NRS postprocedure pain 1/10 NRS.  The pain returned back to baseline as expected after the diagnostic phase.  This is a positive diagnostic block      Consented Procedure: Diagnostic medial branch blocks targeting the BILATERAL L3-4, L4-5, and L5-S1 facet joints with fluoroscopic guidance #2    I have examined the patient, provided the risks, benefits, and alternatives to the procedure(s) indicated on the signed consent form, and the patient wishes to proceed.    H&P reviewed. The patient was examined and there are no changes to the H&P      Satya Ponce M.D.  04/29/25 10:03 AM

## 2025-04-30 ENCOUNTER — OFFICE VISIT (OUTPATIENT)
Dept: CARDIOLOGY | Facility: MEDICAL CENTER | Age: 60
End: 2025-04-30
Attending: FAMILY MEDICINE
Payer: MEDICAID

## 2025-04-30 ENCOUNTER — TELEPHONE (OUTPATIENT)
Dept: PHYSICAL MEDICINE AND REHAB | Facility: MEDICAL CENTER | Age: 60
End: 2025-04-30

## 2025-04-30 VITALS
RESPIRATION RATE: 12 BRPM | BODY MASS INDEX: 39.24 KG/M2 | HEIGHT: 67 IN | HEART RATE: 90 BPM | DIASTOLIC BLOOD PRESSURE: 60 MMHG | SYSTOLIC BLOOD PRESSURE: 98 MMHG | WEIGHT: 250 LBS | OXYGEN SATURATION: 94 %

## 2025-04-30 DIAGNOSIS — I83.813 VARICOSE VEINS OF BOTH LOWER EXTREMITIES WITH PAIN: ICD-10-CM

## 2025-04-30 DIAGNOSIS — I73.9 PVD (PERIPHERAL VASCULAR DISEASE) (HCC): ICD-10-CM

## 2025-04-30 DIAGNOSIS — I10 ESSENTIAL HYPERTENSION: ICD-10-CM

## 2025-04-30 DIAGNOSIS — I87.2 VENOUS INSUFFICIENCY OF BOTH LOWER EXTREMITIES: ICD-10-CM

## 2025-04-30 DIAGNOSIS — I48.19 PERSISTENT ATRIAL FIBRILLATION (HCC): ICD-10-CM

## 2025-04-30 LAB — EKG IMPRESSION: NORMAL

## 2025-04-30 PROCEDURE — 99213 OFFICE O/P EST LOW 20 MIN: CPT | Performed by: INTERNAL MEDICINE

## 2025-04-30 PROCEDURE — 93005 ELECTROCARDIOGRAM TRACING: CPT | Mod: TC | Performed by: INTERNAL MEDICINE

## 2025-04-30 RX ORDER — AMIODARONE HYDROCHLORIDE 200 MG/1
200 TABLET ORAL
Qty: 60 TABLET | Refills: 6 | Status: SHIPPED | OUTPATIENT
Start: 2025-04-30

## 2025-04-30 ASSESSMENT — ENCOUNTER SYMPTOMS
DOUBLE VISION: 0
PND: 0
VOMITING: 0
LOSS OF CONSCIOUSNESS: 0
ABDOMINAL PAIN: 0
NAUSEA: 0
ORTHOPNEA: 0
FEVER: 0
DEPRESSION: 0
EYE PAIN: 0
BLOOD IN STOOL: 0
SENSORY CHANGE: 0
CLAUDICATION: 0
WEIGHT LOSS: 0
BLURRED VISION: 0
COUGH: 0
BRUISES/BLEEDS EASILY: 0
HEADACHES: 0
EYE DISCHARGE: 0
CHILLS: 0
SPEECH CHANGE: 0
DIZZINESS: 0
FALLS: 0
SHORTNESS OF BREATH: 0
PALPITATIONS: 1
MYALGIAS: 0
HALLUCINATIONS: 0

## 2025-04-30 ASSESSMENT — FIBROSIS 4 INDEX: FIB4 SCORE: 1.16

## 2025-04-30 NOTE — PROGRESS NOTES
Chief Complaint   Patient presents with    Hypertension     F/V DX: Essential hypertension    Atrial Fibrillation     F/V DX:   Persistent atrial fibrillation (HCC       Subjective     Taarianna Stokes is a 59 y.o. male who presents today for cardiac care due to new onset of atrial fibrillation seen on event monitor ordered by his primary care physician.  Patient is largely asymptomatic.  No prior cardiac problems no prior cardiac procedure or surgery.  Used to be a smoker but quit 6 years ago.    I personally interpreted all tracing of his event monitor.    I have independently interpreted and reviewed echocardiogram's actual images with patient which showed normal left ventricular systolic function. No wall motion abnormality. No evidence of pulmonary hypertension. No significant valvular disease.    I personally interpreted blood test results which showed elevated glycohemoglobin of 6.6 down from 7.1, LDL of 73, triglyceride 152, GFR of 97, potassium 4.9.    I have personally interpreted EKG today with patient, there is no evidence of acute coronary syndrome, no evidence of prior infarct, normal NE and QT interval, no significant conduction disease. Atrial fibrillation.      Past Medical History:   Diagnosis Date    Arrhythmia     A-fib    Cancer (HCC) 01/2024    prostate cancer    Dental disorder     upper/lower dentures    Dyslipidemia     GERD (gastroesophageal reflux disease)     Heart burn     High cholesterol     Hypertension     Indigestion     Sleep apnea     Snoring      Past Surgical History:   Procedure Laterality Date    LUMBAR MEDIAL BRANCH BLOCKS Bilateral 4/16/2025    Procedure: Diagnostic medial branch blocks targeting the BILATERAL L3-4, L4-5, and L5-S1 facet joints with fluoroscopic guidance;  Surgeon: Satya Ponce M.D.;  [facility];  Service: Pain Management    EMG/NCS  2/3/2025     Family History   Problem Relation Age of Onset    Hypertension Mother      Cancer Maternal Grandmother      Social History     Socioeconomic History    Marital status: Single     Spouse name: Not on file    Number of children: Not on file    Years of education: Not on file    Highest education level: Associate degree: occupational, technical, or vocational program   Occupational History    Not on file   Tobacco Use    Smoking status: Former     Current packs/day: 0.00     Average packs/day: 1 pack/day for 39.0 years (39.0 ttl pk-yrs)     Types: Cigarettes     Start date: 1979     Quit date: 2018     Years since quittin.3    Smokeless tobacco: Never    Tobacco comments:     1ppd   Vaping Use    Vaping status: Never Used   Substance and Sexual Activity    Alcohol use: Not Currently     Comment: once in awhile, 1 beer    Drug use: No    Sexual activity: Not Currently     Partners: Female   Other Topics Concern    Not on file   Social History Narrative    Not on file     Social Drivers of Health     Financial Resource Strain: High Risk (3/22/2023)    Overall Financial Resource Strain (CARDIA)     Difficulty of Paying Living Expenses: Hard   Food Insecurity: Food Insecurity Present (3/22/2023)    Hunger Vital Sign     Worried About Running Out of Food in the Last Year: Sometimes true     Ran Out of Food in the Last Year: Never true   Transportation Needs: No Transportation Needs (3/22/2023)    PRAPARE - Transportation     Lack of Transportation (Medical): No     Lack of Transportation (Non-Medical): No   Physical Activity: Sufficiently Active (3/22/2023)    Exercise Vital Sign     Days of Exercise per Week: 7 days     Minutes of Exercise per Session: 150+ min   Stress: Stress Concern Present (3/22/2023)    [facility] - Occupational Stress Questionnaire     Feeling of Stress : To some extent   Social Connections: Moderately Isolated (3/22/2023)    Social Connection and Isolation Panel [NHANES]     Frequency of Communication with Friends and Family: More  than three times a week     Frequency of Social Gatherings with Friends and Family: More than three times a week     Attends Anabaptist Services: Never     Active Member of Clubs or Organizations: No     Attends Club or Organization Meetings: Never     Marital Status: Living with partner   Intimate Partner Violence: Not on file   Housing Stability: Low Risk  (3/22/2023)    Housing Stability Vital Sign     Unable to Pay for Housing in the Last Year: No     Number of Places Lived in the Last Year: 1     Unstable Housing in the Last Year: No     Allergies   Allergen Reactions    Metformin Diarrhea     diarrhea     Outpatient Encounter Medications as of 4/30/2025   Medication Sig Dispense Refill    amiodarone (CORDARONE) 200 MG Tab Take 1 Tablet by mouth 2 times a day. 60 Tablet 6    Budeson-Glycopyrrol-Formoterol (BREZTRI AEROSPHERE) 160-9-4.8 MCG/ACT Aerosol Inhale.      gabapentin (NEURONTIN) 100 MG Cap Take 1-3 Capsules by mouth at bedtime as needed (pain). 90 Capsule 3    bisoprolol (ZEBETA) 5 MG Tab Take 1 Tablet by mouth every day. 90 Tablet 3    Empagliflozin (JARDIANCE) 10 MG Tab tablet Take 1 Tablet by mouth every day. 90 Tablet 3    atorvastatin (LIPITOR) 10 MG Tab Take 1 Tablet by mouth every evening. 90 Tablet 3    lisinopril (PRINIVIL) 5 MG Tab Take 1 tablet by mouth once daily 90 Tablet 3    apixaban (ELIQUIS) 5mg Tab Take 1 Tablet by mouth 2 times a day. 60 Tablet 11    tamsulosin (FLOMAX) 0.4 MG capsule Take 1 Capsule by mouth at bedtime.      [DISCONTINUED] diclofenac DR (VOLTAREN) 75 MG Tablet Delayed Response Take 1 tablet by mouth twice daily (Patient not taking: Reported on 4/30/2025) 60 Tablet 0     No facility-administered encounter medications on file as of 4/30/2025.     Review of Systems   Constitutional:  Negative for chills, fever, malaise/fatigue and weight loss.   HENT:  Negative for ear discharge, ear pain, hearing loss and nosebleeds.    Eyes:  Negative for blurred vision, double vision,  "pain and discharge.   Respiratory:  Negative for cough and shortness of breath.    Cardiovascular:  Positive for palpitations. Negative for chest pain, orthopnea, claudication, leg swelling and PND.   Gastrointestinal:  Negative for abdominal pain, blood in stool, melena, nausea and vomiting.   Genitourinary:  Negative for dysuria and hematuria.   Musculoskeletal:  Negative for falls, joint pain and myalgias.   Skin:  Negative for itching and rash.   Neurological:  Negative for dizziness, sensory change, speech change, loss of consciousness and headaches.   Endo/Heme/Allergies:  Negative for environmental allergies. Does not bruise/bleed easily.   Psychiatric/Behavioral:  Negative for depression, hallucinations and suicidal ideas.               Objective     BP 98/60 (BP Location: Left arm, Patient Position: Sitting, BP Cuff Size: Adult)   Pulse 90   Resp 12   Ht 1.702 m (5' 7\")   Wt 113 kg (250 lb)   SpO2 94%   BMI 39.16 kg/m²     Physical Exam  Vitals and nursing note reviewed.   Constitutional:       General: He is not in acute distress.     Appearance: He is not diaphoretic.   HENT:      Head: Normocephalic and atraumatic.      Right Ear: External ear normal.      Left Ear: External ear normal.      Nose: No congestion or rhinorrhea.   Eyes:      General:         Right eye: No discharge.         Left eye: No discharge.   Neck:      Thyroid: No thyromegaly.      Vascular: No JVD.   Cardiovascular:      Rate and Rhythm: Normal rate. Rhythm irregular.      Pulses: Normal pulses.   Pulmonary:      Effort: No respiratory distress.   Abdominal:      General: There is no distension.      Tenderness: There is no abdominal tenderness.   Musculoskeletal:         General: No swelling or tenderness.      Right lower leg: No edema.      Left lower leg: No edema.      Comments: + varicose veins without evidence of ulcer, + discoloration.     Skin:     General: Skin is warm and dry.   Neurological:      Mental Status: He " is alert and oriented to person, place, and time.      Cranial Nerves: No cranial nerve deficit.   Psychiatric:         Behavior: Behavior normal.                Assessment & Plan     1. Persistent atrial fibrillation (HCC)  EKG    amiodarone (CORDARONE) 200 MG Tab      2. Essential hypertension  EKG      3. Varicose veins of both lower extremities with pain  US-EXTREMITY VENOUS LOWER BILAT      4. PVD (peripheral vascular disease) (HCC) [I73.9]        5. Venous insufficiency of both lower extremities [I87.2]            Medical Decision Making: Today's Assessment/Status/Plan:     Overall, patient's [id number] score is elevated with hypertension and type 2 diabetes.  I will continue patient on anticoagulation with Eliquis 5 mg p.o. twice a day for stroke risk reduction.    Patient will benefit from sinus restoration as he is young. Will add Amiodarone 200 mg 2x daily.    Continue KAMILAH tx.    Will continue Jardiance 10 mg daily for DM2 treatment and cardiac benefits. Will discuss with PMD for further DM2 tx.    This visit encounter signifies the visit complexity inherent to evaluation and management associated with medical care services that serve as the continuing focal point for all needed health care services and/or with medical care services that are part of ongoing care related to this patient's single, serious condition, complex cardiac condition.    Stoney Cochran M.D.

## 2025-04-30 NOTE — TELEPHONE ENCOUNTER
Called for post-sp check-up. Pt reported the following regarding the procedure site: Diagnostic medial branch blocks targeting the BILATERAL L3-4, L4-5, and L5-S1 facet joints with fluoroscopic guidance #2     Change in pain?: relief for few hours    Concerns?: No    Confirmed [facility] appt?: Yes

## 2025-05-01 ENCOUNTER — APPOINTMENT (OUTPATIENT)
Dept: RADIOLOGY | Facility: MEDICAL CENTER | Age: 60
End: 2025-05-01
Attending: STUDENT IN AN ORGANIZED HEALTH CARE EDUCATION/TRAINING PROGRAM
Payer: MEDICAID

## 2025-05-01 ENCOUNTER — HOSPITAL ENCOUNTER (EMERGENCY)
Facility: MEDICAL CENTER | Age: 60
End: 2025-05-01
Attending: STUDENT IN AN ORGANIZED HEALTH CARE EDUCATION/TRAINING PROGRAM
Payer: MEDICAID

## 2025-05-01 ENCOUNTER — OFFICE VISIT (OUTPATIENT)
Dept: URGENT CARE | Facility: PHYSICIAN GROUP | Age: 60
End: 2025-05-01
Payer: MEDICAID

## 2025-05-01 ENCOUNTER — TELEPHONE (OUTPATIENT)
Dept: PHYSICAL MEDICINE AND REHAB | Facility: MEDICAL CENTER | Age: 60
End: 2025-05-01
Payer: MEDICAID

## 2025-05-01 VITALS
OXYGEN SATURATION: 92 % | BODY MASS INDEX: 37.85 KG/M2 | RESPIRATION RATE: 18 BRPM | DIASTOLIC BLOOD PRESSURE: 69 MMHG | HEIGHT: 67 IN | HEART RATE: 94 BPM | SYSTOLIC BLOOD PRESSURE: 108 MMHG | WEIGHT: 241.18 LBS | TEMPERATURE: 97 F

## 2025-05-01 VITALS
TEMPERATURE: 97.7 F | OXYGEN SATURATION: 100 % | HEART RATE: 84 BPM | DIASTOLIC BLOOD PRESSURE: 66 MMHG | BODY MASS INDEX: 37.53 KG/M2 | RESPIRATION RATE: 16 BRPM | HEIGHT: 67 IN | WEIGHT: 239.09 LBS | SYSTOLIC BLOOD PRESSURE: 108 MMHG

## 2025-05-01 DIAGNOSIS — M79.10 MYALGIA: ICD-10-CM

## 2025-05-01 DIAGNOSIS — M79.651 PAIN OF RIGHT THIGH: ICD-10-CM

## 2025-05-01 DIAGNOSIS — R06.00 DYSPNEA, UNSPECIFIED TYPE: ICD-10-CM

## 2025-05-01 DIAGNOSIS — R07.9 CHEST PAIN, UNSPECIFIED TYPE: ICD-10-CM

## 2025-05-01 DIAGNOSIS — M79.604 PAIN OF RIGHT LOWER EXTREMITY: ICD-10-CM

## 2025-05-01 DIAGNOSIS — R10.84 GENERALIZED ABDOMINAL PAIN: ICD-10-CM

## 2025-05-01 LAB
ALBUMIN SERPL BCP-MCNC: 4 G/DL (ref 3.2–4.9)
ALBUMIN/GLOB SERPL: 1.5 G/DL
ALP SERPL-CCNC: 71 U/L (ref 30–99)
ALT SERPL-CCNC: 27 U/L (ref 2–50)
ANION GAP SERPL CALC-SCNC: 14 MMOL/L (ref 7–16)
AST SERPL-CCNC: 20 U/L (ref 12–45)
BASOPHILS # BLD AUTO: 0.4 % (ref 0–1.8)
BASOPHILS # BLD: 0.03 K/UL (ref 0–0.12)
BILIRUB SERPL-MCNC: 0.6 MG/DL (ref 0.1–1.5)
BUN SERPL-MCNC: 24 MG/DL (ref 8–22)
CALCIUM ALBUM COR SERPL-MCNC: 9.8 MG/DL (ref 8.5–10.5)
CALCIUM SERPL-MCNC: 9.8 MG/DL (ref 8.5–10.5)
CHLORIDE SERPL-SCNC: 106 MMOL/L (ref 96–112)
CO2 SERPL-SCNC: 19 MMOL/L (ref 20–33)
CREAT SERPL-MCNC: 1.02 MG/DL (ref 0.5–1.4)
D DIMER PPP IA.FEU-MCNC: <0.27 UG/ML (FEU) (ref 0–0.5)
EOSINOPHIL # BLD AUTO: 0.14 K/UL (ref 0–0.51)
EOSINOPHIL NFR BLD: 1.7 % (ref 0–6.9)
ERYTHROCYTE [DISTWIDTH] IN BLOOD BY AUTOMATED COUNT: 45.9 FL (ref 35.9–50)
FLUAV RNA SPEC QL NAA+PROBE: NEGATIVE
FLUBV RNA SPEC QL NAA+PROBE: NEGATIVE
GFR SERPLBLD CREATININE-BSD FMLA CKD-EPI: 85 ML/MIN/1.73 M 2
GLOBULIN SER CALC-MCNC: 2.7 G/DL (ref 1.9–3.5)
GLUCOSE SERPL-MCNC: 151 MG/DL (ref 65–99)
HCT VFR BLD AUTO: 42.7 % (ref 42–52)
HGB BLD-MCNC: 14.4 G/DL (ref 14–18)
IMM GRANULOCYTES # BLD AUTO: 0.03 K/UL (ref 0–0.11)
IMM GRANULOCYTES NFR BLD AUTO: 0.4 % (ref 0–0.9)
LYMPHOCYTES # BLD AUTO: 0.69 K/UL (ref 1–4.8)
LYMPHOCYTES NFR BLD: 8.4 % (ref 22–41)
MCH RBC QN AUTO: 31.2 PG (ref 27–33)
MCHC RBC AUTO-ENTMCNC: 33.7 G/DL (ref 32.3–36.5)
MCV RBC AUTO: 92.4 FL (ref 81.4–97.8)
MONOCYTES # BLD AUTO: 0.38 K/UL (ref 0–0.85)
MONOCYTES NFR BLD AUTO: 4.6 % (ref 0–13.4)
NEUTROPHILS # BLD AUTO: 6.98 K/UL (ref 1.82–7.42)
NEUTROPHILS NFR BLD: 84.5 % (ref 44–72)
NRBC # BLD AUTO: 0 K/UL
NRBC BLD-RTO: 0 /100 WBC (ref 0–0.2)
NT-PROBNP SERPL IA-MCNC: 289 PG/ML (ref 0–125)
PLATELET # BLD AUTO: 189 K/UL (ref 164–446)
PMV BLD AUTO: 9.1 FL (ref 9–12.9)
POTASSIUM SERPL-SCNC: 4.1 MMOL/L (ref 3.6–5.5)
PROT SERPL-MCNC: 6.7 G/DL (ref 6–8.2)
RBC # BLD AUTO: 4.62 M/UL (ref 4.7–6.1)
RSV RNA SPEC QL NAA+PROBE: NEGATIVE
SARS-COV-2 RNA RESP QL NAA+PROBE: NOTDETECTED
SODIUM SERPL-SCNC: 139 MMOL/L (ref 135–145)
TROPONIN T SERPL-MCNC: 8 NG/L (ref 6–19)
WBC # BLD AUTO: 8.3 K/UL (ref 4.8–10.8)

## 2025-05-01 PROCEDURE — 36415 COLL VENOUS BLD VENIPUNCTURE: CPT

## 2025-05-01 PROCEDURE — 85025 COMPLETE CBC W/AUTO DIFF WBC: CPT

## 2025-05-01 PROCEDURE — 83880 ASSAY OF NATRIURETIC PEPTIDE: CPT

## 2025-05-01 PROCEDURE — 700102 HCHG RX REV CODE 250 W/ 637 OVERRIDE(OP): Mod: UD | Performed by: STUDENT IN AN ORGANIZED HEALTH CARE EDUCATION/TRAINING PROGRAM

## 2025-05-01 PROCEDURE — 93971 EXTREMITY STUDY: CPT | Mod: RT

## 2025-05-01 PROCEDURE — 84484 ASSAY OF TROPONIN QUANT: CPT

## 2025-05-01 PROCEDURE — 80053 COMPREHEN METABOLIC PANEL: CPT

## 2025-05-01 PROCEDURE — 0241U HCHG SARS-COV-2 COVID-19 NFCT DS RESP RNA 4 TRGT ED POC: CPT

## 2025-05-01 PROCEDURE — 71045 X-RAY EXAM CHEST 1 VIEW: CPT

## 2025-05-01 PROCEDURE — A9270 NON-COVERED ITEM OR SERVICE: HCPCS | Mod: UD | Performed by: STUDENT IN AN ORGANIZED HEALTH CARE EDUCATION/TRAINING PROGRAM

## 2025-05-01 PROCEDURE — 93005 ELECTROCARDIOGRAM TRACING: CPT | Mod: TC

## 2025-05-01 PROCEDURE — 3078F DIAST BP <80 MM HG: CPT | Performed by: PHYSICIAN ASSISTANT

## 2025-05-01 PROCEDURE — 85379 FIBRIN DEGRADATION QUANT: CPT

## 2025-05-01 PROCEDURE — 99215 OFFICE O/P EST HI 40 MIN: CPT | Performed by: PHYSICIAN ASSISTANT

## 2025-05-01 PROCEDURE — 93971 EXTREMITY STUDY: CPT | Mod: 26,RT | Performed by: INTERNAL MEDICINE

## 2025-05-01 PROCEDURE — 99285 EMERGENCY DEPT VISIT HI MDM: CPT

## 2025-05-01 PROCEDURE — 3074F SYST BP LT 130 MM HG: CPT | Performed by: PHYSICIAN ASSISTANT

## 2025-05-01 PROCEDURE — 93005 ELECTROCARDIOGRAM TRACING: CPT | Mod: TC | Performed by: STUDENT IN AN ORGANIZED HEALTH CARE EDUCATION/TRAINING PROGRAM

## 2025-05-01 RX ORDER — AMIODARONE HYDROCHLORIDE 200 MG/1
200 TABLET ORAL ONCE
Status: COMPLETED | OUTPATIENT
Start: 2025-05-01 | End: 2025-05-01

## 2025-05-01 RX ADMIN — AMIODARONE HYDROCHLORIDE 200 MG: 200 TABLET ORAL at 20:44

## 2025-05-01 ASSESSMENT — ENCOUNTER SYMPTOMS
ABDOMINAL PAIN: 1
CHILLS: 1
HEADACHES: 1
SORE THROAT: 0
FEVER: 1
SHORTNESS OF BREATH: 0
MYALGIAS: 1
DIZZINESS: 1
NAUSEA: 1
BACK PAIN: 1
WHEEZING: 0
DIARRHEA: 0
VOMITING: 0
COUGH: 0

## 2025-05-01 ASSESSMENT — FIBROSIS 4 INDEX
FIB4 SCORE: 1.16
FIB4 SCORE: 1.16

## 2025-05-01 NOTE — TELEPHONE ENCOUNTER
Called for post-sp check-up. Pt reported the following regarding the procedure site: Diagnostic medial branch blocks targeting the BILATERAL L3-4, L4-5, and L5-S1 facet joints with fluoroscopic guidance #2       Change in pain?: Yes    Concerns?: Pt stated that he experienced around 8 pm last night that his inner thigh on the Right leg feels like deep pain, but when he moves the pain go away.  But when the pain go away, Pt stated he is having chills, Aches, body cramps and head throbbing,until today.  Pain level when his body cramps are 15 out 10.  He stated that if he is not moving his pain is low but he just being so careful right now.    Pt is asking if this is normal or is having sort of infection.    Please advise.        Confirmed [facility] appt?: Yes

## 2025-05-01 NOTE — TELEPHONE ENCOUNTER
Spoke to Dr. Ponce and he recommend to go UC to get evaluated in regards to his Symptoms.  Pt agreed.

## 2025-05-02 DIAGNOSIS — I48.19 PERSISTENT ATRIAL FIBRILLATION (HCC): ICD-10-CM

## 2025-05-02 DIAGNOSIS — D68.69 HYPERCOAGULABLE STATE DUE TO PERSISTENT ATRIAL FIBRILLATION (HCC): ICD-10-CM

## 2025-05-02 DIAGNOSIS — I48.19 HYPERCOAGULABLE STATE DUE TO PERSISTENT ATRIAL FIBRILLATION (HCC): ICD-10-CM

## 2025-05-02 LAB — EKG IMPRESSION: NORMAL

## 2025-05-02 PROCEDURE — 99283 EMERGENCY DEPT VISIT LOW MDM: CPT

## 2025-05-02 NOTE — ED PROVIDER NOTES
"      ED Provider Note    CHIEF COMPLAINT  Chief Complaint   Patient presents with    Leg Pain     Right upper leg    Body Aches    Muscle Pain    Headache    Abdominal Pain     across    Shortness of Breath       LIMITATION TO HISTORY   Select: None    HPI    Ta Chris Stokes is a 60 y.o. male who presents to the Emergency Department for evaluation of right leg pain onset prior to arrival. The patient reports associated generalized body aches, chills, muscle spasms in his legs, generalized joint aches, headache, abdominal pain, and shortness of breath. He also notes he coughed and produced a large amount of phlegm. The patient reports he had a nerve block in his back done 2 days ago which was the 2nd of his treatments. He states he is worried about a blood clot because he has had to periodically stop taking his eliquis recenlty including a 5 day hiatus from taking it very recently, as he just being taking it again yesterday. Patient notes he wears compression socks for \"vein problems\" which seem to be venous insufficiency.. He takes his eliquis for his atrial fibrillation history. He states he was recently prescribed a new cardiac medication that he has not started yet.     OUTSIDE HISTORIAN(S):  Select: Patient's wife was present and provided helpful and collateral history.    EXTERNAL RECORDS REVIEWED  Select: The patient has been admitted twice in the last month for spondylosis of the cervical spine with chronic neck pain, foraminal stenosis of the cervical region, and bilateral carpal tunnel syndrome.    PAST MEDICAL HISTORY  Past Medical History:   Diagnosis Date    Arrhythmia     A-fib    Cancer (HCC) 01/2024    prostate cancer    Dental disorder     upper/lower dentures    Dyslipidemia     GERD (gastroesophageal reflux disease)     Heart burn     High cholesterol     Hypertension     Indigestion     Sleep apnea     Snoring        SURGICAL HISTORY  Past Surgical History:   Procedure Laterality Date    LUMBAR " MEDIAL BRANCH BLOCKS Bilateral 2025    Procedure: Diagnostic medial branch blocks targeting the BILATERAL L3-4, L4-5, and L5-S1 facet joints with fluoroscopic guidance #2…Note: Diagnostic medial branch block #2 is only be done if procedure #1 is a positive block.  This will be on a separate date from procedure #1.;  Surgeon: Satya Ponce M.D.;  [facility];  Service: Pain Management    LUMBAR MEDIAL BRANCH BLOCKS Bilateral 2025    Procedure: Diagnostic medial branch blocks targeting the BILATERAL L3-4, L4-5, and L5-S1 facet joints with fluoroscopic guidance;  Surgeon: Satya Ponce M.D.;  [facility];  Service: Pain Management    EMG/NCS  2/3/2025       FAMILY HISTORY  Family History   Problem Relation Age of Onset    Hypertension Mother     Cancer Maternal Grandmother         SOCIAL HISTORY  Social History     Socioeconomic History    Marital status: Single     Spouse name: Not on file    Number of children: Not on file    Years of education: Not on file    Highest education level: Associate degree: occupational, technical, or vocational program   Occupational History    Not on file   Tobacco Use    Smoking status: Former     Current packs/day: 0.00     Average packs/day: 1 pack/day for 39.0 years (39.0 ttl pk-yrs)     Types: Cigarettes     Start date: 1979     Quit date: 2018     Years since quittin.3    Smokeless tobacco: Never    Tobacco comments:     1ppd   Vaping Use    Vaping status: Never Used   Substance and Sexual Activity    Alcohol use: Not Currently     Comment: once in awhile, 1 beer    Drug use: No    Sexual activity: Not Currently     Partners: Female   Other Topics Concern    Not on file   Social History Narrative    Not on file     Social Drivers of Health     Financial Resource Strain: High Risk (3/22/2023)    Overall Financial Resource Strain (CARDIA)     Difficulty of Paying Living Expenses: Hard   Food  Insecurity: Food Insecurity Present (3/22/2023)    Hunger Vital Sign     Worried About Running Out of Food in the Last Year: Sometimes true     Ran Out of Food in the Last Year: Never true   Transportation Needs: No Transportation Needs (3/22/2023)    PRAPARE - Transportation     Lack of Transportation (Medical): No     Lack of Transportation (Non-Medical): No   Physical Activity: Sufficiently Active (3/22/2023)    Exercise Vital Sign     Days of Exercise per Week: 7 days     Minutes of Exercise per Session: 150+ min   Stress: Stress Concern Present (3/22/2023)    [employer] [place] - Occupational Stress Questionnaire     Feeling of Stress : To some extent   Social Connections: Moderately Isolated (3/22/2023)    Social Connection and Isolation Panel [NHANES]     Frequency of Communication with Friends and Family: More than three times a week     Frequency of Social Gatherings with Friends and Family: More than three times a week     Attends Anabaptist Services: Never     Active Member of Clubs or Organizations: No     Attends Club or Organization Meetings: Never     Marital Status: Living with partner   Intimate Partner Violence: Not on file   Housing Stability: Low Risk  (3/22/2023)    Housing Stability Vital Sign     Unable to Pay for Housing in the Last Year: No     Number of Places Lived in the Last Year: 1     Unstable Housing in the Last Year: No       CURRENT MEDICATIONS  No current facility-administered medications on file prior to encounter.     Current Outpatient Medications on File Prior to Encounter   Medication Sig Dispense Refill    amiodarone (CORDARONE) 200 MG Tab Take 1 Tablet by mouth 2 times a day. 60 Tablet 6    Budeson-Glycopyrrol-Formoterol (BREZTRI AEROSPHERE) 160-9-4.8 MCG/ACT Aerosol Inhale.      bisoprolol (ZEBETA) 5 MG Tab Take 1 Tablet by mouth every day. 90 Tablet 3    Empagliflozin (JARDIANCE) 10 MG Tab tablet Take 1 Tablet by mouth every day. 90 Tablet 3     "atorvastatin (LIPITOR) 10 MG Tab Take 1 Tablet by mouth every evening. 90 Tablet 3    lisinopril (PRINIVIL) 5 MG Tab Take 1 tablet by mouth once daily 90 Tablet 3    apixaban (ELIQUIS) 5mg Tab Take 1 Tablet by mouth 2 times a day. 60 Tablet 11    tamsulosin (FLOMAX) 0.4 MG capsule Take 1 Capsule by mouth at bedtime.         ALLERGIES  Allergies   Allergen Reactions    Metformin Diarrhea     diarrhea       PHYSICAL EXAM  VITAL SIGNS:/68   Pulse (!) 110   Temp 36.1 °C (97 °F) (Temporal)   Resp 18   Ht 1.702 m (5' 7\")   Wt 109 kg (241 lb 2.9 oz)   SpO2 93%   BMI 37.77 kg/m²       GENERAL: Awake and alert  HEAD: Normocephalic and atraumatic  NECK: Normal range of motion, without meningismus  EYES: Pupils Equal, Round, Reactive to Light, extraocular movements intact, conjunctiva white  ENT: Mucous membranes moist, oropharynx clear  PULMONARY: Normal effort, clear to auscultation  CARDIOVASCULAR: No murmurs, clicks or rubs, peripheral pulses 2+, irregular rhythm  ABDOMINAL: Soft, non-tender, no guarding or rigidity present, no pulsatile masses  BACK: no midline tenderness, no costovertebral tenderness  NEUROLOGICAL: Grossly non-focal neurological examination, speech normal, gait normal  EXTREMITIES: No edema, normal to inspection  SKIN: Warm and dry.  PSYCHIATRIC: Affect is appropriate    DIAGNOSTIC STUDIES  EKG  I have independently interpreted this EKG  Results for orders placed or performed during the hospital encounter of 25   EKG   Result Value Ref Range    Report       [facility] Emergency Dept.    Test Date:  2025  Pt Name:    ALLI KENNEDY                 Department: ER  MRN:        [id number]                      Room:  Gender:     Male                         Technician: [id number]  :        1965                   Requested By:ER TRIAGE PROTOCOL  Order #:    [id number]                    Reading MD:    Measurements  Intervals                                Axis  Rate:  "      91                           P:          0  MA:         0                            QRS:        107  QRSD:       102                          T:          35  QT:         350  QTc:        431    Interpretive Statements  Atrial fibrillation  Right axis deviation  Compared to ECG 04/30/2025 08:46:51  Right-axis deviation now present  T-wave abnormality no longer present          LABS  Labs Reviewed   CBC WITH DIFFERENTIAL - Abnormal; Notable for the following components:       Result Value    RBC 4.62 (*)     Neutrophils-Polys 84.50 (*)     Lymphocytes 8.40 (*)     Lymphs (Absolute) 0.69 (*)     All other components within normal limits   COMP METABOLIC PANEL - Abnormal; Notable for the following components:    Co2 19 (*)     Glucose 151 (*)     Bun 24 (*)     All other components within normal limits   PROBRAIN NATRIURETIC PEPTIDE, NT - Abnormal; Notable for the following components:    NT-proBNP 289 (*)     All other components within normal limits   TROPONIN   COV-2, FLU A/B, AND RSV BY PCR (CEPOpenDoorID)   ESTIMATED GFR   D-DIMER   POC COV-2, FLU A/B, RSV BY PCR     All labs reviewed by me.     RADIOLOGY  I have independently interpreted the diagnostic imaging associated with this visit and am waiting the final reading from the radiologist.   My preliminary interpretation is as follows: no pnuemonia    Formal Radiologist interpretation:  US-EXTREMITY VENOUS LOWER UNILAT RIGHT   Final Result      DX-CHEST-PORTABLE (1 VIEW)   Final Result      Hypoinflation without other evidence for acute cardiopulmonary disease.          COURSE & MEDICAL DECISION MAKING    ED COURSE:    INTERVENTIONS BY ME:  Medications   amiodarone (Cordarone) tablet 200 mg (200 mg Oral Given 5/1/25 2044)       Response on recheck: improvement of the patient's tachycardia    7:39 PM - Patient seen and examined at bedside. This is a 60 year old man who presents to the ED for evaluation of multiple complaints including right leg pain, generalized  body aches, chills, muscle spasms in his legs, generalized joint aches, headache, abdominal pain, and shortness of breath. He has been getting injections in his back for pain. Patient takes eliquis for atrial fibrillation but has been on multiple breaks from this medication recently. Discussed getting labs and imaging to evaluate and the patient is amenable.     8:28 PM - The patient will be medicated with amiodarone 200 mg PO for his tachycardia.     8:38 PM - I reevaluated the patient at bedside. I discussed my plan for admission for his possible CHF given his lab results. After a lengthy discussion, the patient has elected to be discharged home. I discussed plan for discharge and follow up as outlined below. The patient is stable for discharge at this time and will return for any new or worsening symptoms. Patient verbalizes understanding and support with my plan for discharge.      INITIAL ASSESSMENT, COURSE AND PLAN  Care Narrative:     Ta Stokes is a 60-year-old male with a history of atrial fibrillation on apixaban, hypertension, and recent spinal injections who presented with right leg pain, body aches, dyspnea, cough with sputum, and abdominal discomfort. His concern for DVT was well-founded given recent interruptions in anticoagulation. Lower extremity venous ultrasound was performed and negative for DVT. Lab evaluation showed neutrophilia, mild acidosis (CO? 19), elevated BUN, and a mildly elevated NT-proBNP. EKG confirmed atrial fibrillation with a new right axis deviation and tachycardia, which responded to a dose of oral amiodarone.    Given the constellation of symptoms and borderline hypoxia (SpO? 93%), there was concern for evolving heart failure, viral illness, or post-procedural inflammatory response. Chest imaging showed no acute findings. Though admission was recommended for further monitoring and workup of possible early CHF, the patient opted for discharge after thorough counseling. He  was stable at the time of discharge and agreed to close follow-up and to return if symptoms worsen.    CHEST PAIN:   HEART Score for Major Cardiac Events  HEART Score     History:  0  EC  Age:  1  Risk Factors:  2  Troponin:  0    Heart Score:  3    Total Score   0-3 Points = Low Score, risk of MACE 0.9-1.7%.  4-6 Points = Moderate Score, risk of MACE 12-16.6%  7-10 Points = High Score, risk of MACE 50-65%    ADDITIONAL PROBLEM LIST      DISPOSITION AND DISCUSSIONS  Discussion of management with other [facility] or appropriate source(s): None    I have discussed management of the patient with the following physicians and ADAM's:  None    Escalation of care considered, and ultimately not performed:acute inpatient care management, however at this time, the patient is most appropriate for outpatient management    Barriers to care at this time, including but not limited to:  None .     Decision tools and prescription drugs considered including, but not limited to:     The patient will return for new or worsening symptoms and is stable at the time of discharge.    DISPOSITION:  Patient will be discharged home in stable condition.    FOLLOW UP:  Natalya Ortega M.D.  [address] [address]  [phone]          [facility], Emergency Dept  [address] [facility] [address]  [phone]    If symptoms worsen, If you develop worrisome symptoms      FINAL DIAGNOSIS  1. Chest pain, unspecified type    2. Pain of right lower extremity    3. Dyspnea, unspecified type    4. Generalized abdominal pain    5. Myalgia        Vasyl WALLER (Gladys), am scribing for, and in the presence of, Christopher Murray.    Electronically signed by: Vasyl King (Gladys), 2025    IChristopher personally performed the services described in this documentation, as scribed by Vasyl King in my presence, and it is both accurate and complete.     Electronically signed by: Christopher Murray DO  ,1:21 AM 05/01/25

## 2025-05-02 NOTE — PROGRESS NOTES
Subjective     Dieudonne Stokes is a 60 y.o. male who presents with Other (Procedure done on back Tuesday, last night pressure/ pain inner knee thigh area on R leg, body aches, chills, muscles and joint cramp/ pain, muscle spasm. Pt. States mind is foggy and stomach area is tight. )            HPI  Patient had diagnostic medial branch blocks targeting the BILATERAL L3-4, L4-5, and L5-S1 facet joints with fluoroscopic guidance completed on 4/29/2025.  The next day he began having right upper medial thigh pain with swelling and discomfort.  Now he is having bodyaches, chills, low-grade fever, muscle cramping and joint pain.  He states his stomach hurts and he is nauseous.  He is also having some chest pressure.  He denies cough, congestion, sore throat or URI symptoms.  He denies UTI symptoms.  Patient has history of A-fib and is on Eliquis.  However, he was taken off of Eliquis 5 days prior to the procedure and was recommended to start 5 days after his procedure.  Therefore he has not had anticoagulation for approximately 6 days.      PMH:  has a past medical history of Arrhythmia, Cancer (HCC) (01/2024), Dental disorder, Dyslipidemia, GERD (gastroesophageal reflux disease), Heart burn, High cholesterol, Hypertension, Indigestion, Sleep apnea, and Snoring.  MEDS:   Current Outpatient Medications:     amiodarone (CORDARONE) 200 MG Tab, Take 1 Tablet by mouth 2 times a day., Disp: 60 Tablet, Rfl: 6    Budeson-Glycopyrrol-Formoterol (BREZTRI AEROSPHERE) 160-9-4.8 MCG/ACT Aerosol, Inhale., Disp: , Rfl:     bisoprolol (ZEBETA) 5 MG Tab, Take 1 Tablet by mouth every day., Disp: 90 Tablet, Rfl: 3    Empagliflozin (JARDIANCE) 10 MG Tab tablet, Take 1 Tablet by mouth every day., Disp: 90 Tablet, Rfl: 3    atorvastatin (LIPITOR) 10 MG Tab, Take 1 Tablet by mouth every evening., Disp: 90 Tablet, Rfl: 3    lisinopril (PRINIVIL) 5 MG Tab, Take 1 tablet by mouth once daily, Disp: 90 Tablet, Rfl: 3    apixaban (ELIQUIS) 5mg Tab,  Take 1 Tablet by mouth 2 times a day., Disp: 60 Tablet, Rfl: 11    tamsulosin (FLOMAX) 0.4 MG capsule, Take 1 Capsule by mouth at bedtime., Disp: , Rfl:   ALLERGIES:   Allergies   Allergen Reactions    Metformin Diarrhea     diarrhea     SURGHX:   Past Surgical History:   Procedure Laterality Date    LUMBAR MEDIAL BRANCH BLOCKS Bilateral 4/29/2025    Procedure: Diagnostic medial branch blocks targeting the BILATERAL L3-4, L4-5, and L5-S1 facet joints with fluoroscopic guidance #2…Note: Diagnostic medial branch block #2 is only be done if procedure #1 is a positive block.  This will be on a separate date from procedure #1.;  Surgeon: Satya Ponce M.D.;  [facility];  Service: Pain Management    LUMBAR MEDIAL BRANCH BLOCKS Bilateral 4/16/2025    Procedure: Diagnostic medial branch blocks targeting the BILATERAL L3-4, L4-5, and L5-S1 facet joints with fluoroscopic guidance;  Surgeon: Satya Ponce M.D.;  [facility];  Service: Pain Management    EMG/NCS  2/3/2025     SOCHX:  reports that he quit smoking about 7 years ago. His smoking use included cigarettes. He started smoking about 46 years ago. He has a 39 pack-year smoking history. He has never used smokeless tobacco. He reports that he does not currently use alcohol. He reports that he does not use drugs.  FH: family history includes Cancer in his maternal grandmother; Hypertension in his mother.        Review of Systems   Constitutional:  Positive for chills, fever and malaise/fatigue.   HENT: Negative.  Negative for congestion and sore throat.    Respiratory:  Negative for cough, shortness of breath and wheezing.    Cardiovascular:  Positive for chest pain and leg swelling.   Gastrointestinal:  Positive for abdominal pain and nausea. Negative for diarrhea and vomiting.   Genitourinary: Negative.    Musculoskeletal:  Positive for back pain, joint pain and myalgias.   Neurological:  Positive for  "dizziness and headaches.         Medications, Allergies, and current problem list reviewed today in Epic           Objective     /66 (BP Location: Left arm, Patient Position: Sitting, BP Cuff Size: Adult long)   Pulse 84   Temp 36.5 °C (97.7 °F) (Temporal)   Resp 16   Ht 1.702 m (5' 7\")   Wt 108 kg (239 lb 1.4 oz)   SpO2 100%   BMI 37.45 kg/m²      Physical Exam  Vitals and nursing note reviewed.   Constitutional:       General: He is not in acute distress.     Appearance: Normal appearance. He is well-developed. He is not ill-appearing, toxic-appearing or diaphoretic.   HENT:      Head: Normocephalic and atraumatic.      Right Ear: Tympanic membrane, ear canal and external ear normal.      Left Ear: Tympanic membrane, ear canal and external ear normal.      Nose: Nose normal. No congestion or rhinorrhea.      Mouth/Throat:      Mouth: Mucous membranes are moist.      Pharynx: Oropharynx is clear. No oropharyngeal exudate or posterior oropharyngeal erythema.   Eyes:      General:         Right eye: No discharge.         Left eye: No discharge.      Conjunctiva/sclera: Conjunctivae normal.   Cardiovascular:      Rate and Rhythm: Normal rate and regular rhythm.      Pulses: Normal pulses.      Heart sounds: Normal heart sounds. No murmur heard.  Pulmonary:      Effort: Pulmonary effort is normal. No respiratory distress.      Breath sounds: Normal breath sounds. No stridor. No wheezing, rhonchi or rales.   Chest:      Chest wall: Tenderness present.   Abdominal:      Tenderness: There is abdominal tenderness.   Musculoskeletal:         General: No swelling.      Cervical back: Normal range of motion and neck supple.      Right upper leg: Swelling and tenderness present. No lacerations or bony tenderness.      Right lower leg: No edema.      Left lower leg: No edema.        Legs:    Lymphadenopathy:      Cervical: No cervical adenopathy.   Skin:     General: Skin is warm and dry.   Neurological:      " General: No focal deficit present.      Mental Status: He is alert and oriented to person, place, and time. Mental status is at baseline.   Psychiatric:         Mood and Affect: Mood normal.         Behavior: Behavior normal.         Thought Content: Thought content normal.         Judgment: Judgment normal.                                  Assessment & Plan  Pain of right thigh  Patient is clinically stable with reassuring vital signs.  However given recent procedure, progression of symptoms, risk factors and hypocoagulable state I did refer patient to ER for higher level of care.  Unfortunately, at this hour in the urgent care I would be unable to obtain any necessary labs or imaging.            Please note that this dictation was created using voice recognition software. I have made every reasonable attempt to correct obvious errors, but I expect that there are errors of grammar and possibly content that I did not discover before finalizing the note.

## 2025-05-02 NOTE — ED NOTES
PT ambulated to [facility] with a steady gate. C/C is SOB, fatigue, and body aches. Pt is in a gown, on the monitor, and call light is within reach. Chart up for ERP.

## 2025-05-02 NOTE — ED NOTES
Chief Complaint   Patient presents with    Leg Pain     Right upper leg    Body Aches    Muscle Pain    Headache    Abdominal Pain     across    Shortness of Breath     Pt ambulated to triage sent from Urgent care for US of his leg r/o dvt. Pt c/o leg pain right upper leg started yesterday along with body aches,muscle pain , headache ,abdominal pain and sob. Pt takes Eliquis but had to stopped it for 5days twice for a procedure.   Viral swab collected, pt to ekg

## 2025-05-03 ENCOUNTER — HOSPITAL ENCOUNTER (EMERGENCY)
Facility: MEDICAL CENTER | Age: 60
End: 2025-05-03
Attending: STUDENT IN AN ORGANIZED HEALTH CARE EDUCATION/TRAINING PROGRAM
Payer: MEDICAID

## 2025-05-03 ENCOUNTER — PHARMACY VISIT (OUTPATIENT)
Dept: PHARMACY | Facility: MEDICAL CENTER | Age: 60
End: 2025-05-03
Payer: COMMERCIAL

## 2025-05-03 ENCOUNTER — APPOINTMENT (OUTPATIENT)
Dept: RADIOLOGY | Facility: MEDICAL CENTER | Age: 60
End: 2025-05-03
Attending: STUDENT IN AN ORGANIZED HEALTH CARE EDUCATION/TRAINING PROGRAM
Payer: MEDICAID

## 2025-05-03 VITALS
TEMPERATURE: 97.2 F | OXYGEN SATURATION: 95 % | SYSTOLIC BLOOD PRESSURE: 125 MMHG | HEART RATE: 98 BPM | HEIGHT: 67 IN | DIASTOLIC BLOOD PRESSURE: 85 MMHG | WEIGHT: 244.05 LBS | BODY MASS INDEX: 38.3 KG/M2 | RESPIRATION RATE: 16 BRPM

## 2025-05-03 DIAGNOSIS — R04.0 EPISTAXIS: ICD-10-CM

## 2025-05-03 DIAGNOSIS — R04.2 HEMOPTYSIS: ICD-10-CM

## 2025-05-03 DIAGNOSIS — J18.9 COMMUNITY ACQUIRED PNEUMONIA, UNSPECIFIED LATERALITY: ICD-10-CM

## 2025-05-03 DIAGNOSIS — R05.1 ACUTE COUGH: ICD-10-CM

## 2025-05-03 PROCEDURE — RXMED WILLOW AMBULATORY MEDICATION CHARGE: Performed by: STUDENT IN AN ORGANIZED HEALTH CARE EDUCATION/TRAINING PROGRAM

## 2025-05-03 PROCEDURE — 71046 X-RAY EXAM CHEST 2 VIEWS: CPT

## 2025-05-03 RX ORDER — AMOXICILLIN 250 MG/1
1000 CAPSULE ORAL 3 TIMES DAILY
Qty: 60 CAPSULE | Refills: 0 | Status: ACTIVE | OUTPATIENT
Start: 2025-05-03 | End: 2025-05-08

## 2025-05-03 RX ORDER — AMOXICILLIN 250 MG/1
250 CAPSULE ORAL 3 TIMES DAILY
Qty: 15 CAPSULE | Refills: 0 | Status: SHIPPED | OUTPATIENT
Start: 2025-05-03 | End: 2025-05-03

## 2025-05-03 ASSESSMENT — FIBROSIS 4 INDEX: FIB4 SCORE: 1.2

## 2025-05-03 NOTE — ED PROVIDER NOTES
ED Provider Note    CHIEF COMPLAINT  Chief Complaint   Patient presents with    Cough     Pt reports when he coughs he coughs up some blood. Pt takes blood thinners. Pt also reports he had a blood nose this morning.        LIMITATION TO HISTORY   Select: None    HPI    Ta Stokes is a 60 y.o. male who presents to the Emergency Department for evaluation of coughing up blood and bloody nose onset last night with some associated shortness of breath. Patient states that the blood mostly comes out as he blows his nose. Denies any pain. Patient reports that he takes blood thinners. He states that he used to smoke, but quit 7 years ago. Patient states that he is getting a chest CT next month for lung cancer screening.     OUTSIDE HISTORIAN(S):  Select: Patient's wife is present at bedside.    EXTERNAL RECORDS REVIEWED  Select: Patient was seen at urgent care for pain in his right thigh on 5/1.    PAST MEDICAL HISTORY  Past Medical History:   Diagnosis Date    Arrhythmia     A-fib    Cancer (HCC) 01/2024    prostate cancer    Dental disorder     upper/lower dentures    Dyslipidemia     GERD (gastroesophageal reflux disease)     Heart burn     High cholesterol     Hypertension     Indigestion     Sleep apnea     Snoring        SURGICAL HISTORY  Past Surgical History:   Procedure Laterality Date    LUMBAR MEDIAL BRANCH BLOCKS Bilateral 4/29/2025    Procedure: Diagnostic medial branch blocks targeting the BILATERAL L3-4, L4-5, and L5-S1 facet joints with fluoroscopic guidance #2…Note: Diagnostic medial branch block #2 is only be done if procedure #1 is a positive block.  This will be on a separate date from procedure #1.;  Surgeon: Satya Ponce M.D.;  [facility];  Service: Pain Management    LUMBAR MEDIAL BRANCH BLOCKS Bilateral 4/16/2025    Procedure: Diagnostic medial branch blocks targeting the BILATERAL L3-4, L4-5, and L5-S1 facet joints with fluoroscopic guidance;  Surgeon:  Satay Ponce M.D.;  Location: [facility];  Service: Pain Management    EMG/NCS  2/3/2025       FAMILY HISTORY  Family History   Problem Relation Age of Onset    Hypertension Mother     Cancer Maternal Grandmother         SOCIAL HISTORY  Social History     Socioeconomic History    Marital status: Single     Spouse name: Not on file    Number of children: Not on file    Years of education: Not on file    Highest education level: Associate degree: occupational, technical, or vocational program   Occupational History    Not on file   Tobacco Use    Smoking status: Former     Current packs/day: 0.00     Average packs/day: 1 pack/day for 39.0 years (39.0 ttl pk-yrs)     Types: Cigarettes     Start date: 1979     Quit date: 2018     Years since quittin.3    Smokeless tobacco: Never    Tobacco comments:     1ppd   Vaping Use    Vaping status: Never Used   Substance and Sexual Activity    Alcohol use: Not Currently     Comment: once in awhile, 1 beer    Drug use: No    Sexual activity: Not Currently     Partners: Female   Other Topics Concern    Not on file   Social History Narrative    Not on file     Social Drivers of Health     Financial Resource Strain: High Risk (3/22/2023)    Overall Financial Resource Strain (CARDIA)     Difficulty of Paying Living Expenses: Hard   Food Insecurity: Food Insecurity Present (3/22/2023)    Hunger Vital Sign     Worried About Running Out of Food in the Last Year: Sometimes true     Ran Out of Food in the Last Year: Never true   Transportation Needs: No Transportation Needs (3/22/2023)    PRAPARE - Transportation     Lack of Transportation (Medical): No     Lack of Transportation (Non-Medical): No   Physical Activity: Sufficiently Active (3/22/2023)    Exercise Vital Sign     Days of Exercise per Week: 7 days     Minutes of Exercise per Session: 150+ min   Stress: Stress Concern Present (3/22/2023)    [employer] -  "Occupational Stress Questionnaire     Feeling of Stress : To some extent   Social Connections: Moderately Isolated (3/22/2023)    Social Connection and Isolation Panel [NHANES]     Frequency of Communication with Friends and Family: More than three times a week     Frequency of Social Gatherings with Friends and Family: More than three times a week     Attends L[employer] day Services: Never     Active Member of Clubs or Organizations: No     Attends Club or Organization Meetings: Never     Marital Status: Living with partner   Intimate Partner Violence: Not on file   Housing Stability: Low Risk  (3/22/2023)    Housing Stability Vital Sign     Unable to Pay for Housing in the Last Year: No     Number of Places Lived in the Last Year: 1     Unstable Housing in the Last Year: No       CURRENT MEDICATIONS  No current facility-administered medications on file prior to encounter.     Current Outpatient Medications on File Prior to Encounter   Medication Sig Dispense Refill    amiodarone (CORDARONE) 200 MG Tab Take 1 Tablet by mouth 2 times a day. 60 Tablet 6    Budeson-Glycopyrrol-Formoterol (BREZTRI AEROSPHERE) 160-9-4.8 MCG/ACT Aerosol Inhale.      bisoprolol (ZEBETA) 5 MG Tab Take 1 Tablet by mouth every day. 90 Tablet 3    Empagliflozin (JARDIANCE) 10 MG Tab tablet Take 1 Tablet by mouth every day. 90 Tablet 3    atorvastatin (LIPITOR) 10 MG Tab Take 1 Tablet by mouth every evening. 90 Tablet 3    lisinopril (PRINIVIL) 5 MG Tab Take 1 tablet by mouth once daily 90 Tablet 3    apixaban (ELIQUIS) 5mg Tab Take 1 Tablet by mouth 2 times a day. 60 Tablet 11    tamsulosin (FLOMAX) 0.4 MG capsule Take 1 Capsule by mouth at bedtime.         ALLERGIES  Allergies   Allergen Reactions    Metformin Diarrhea     diarrhea       PHYSICAL EXAM  VITAL SIGNS:/78   Pulse (!) 102   Temp 36.2 °C (97.2 °F) (Temporal)   Resp 16   Ht 1.702 m (5' 7\")   Wt 111 kg (244 lb 0.8 oz)   SpO2 93%   BMI 38.22 kg/m²       GENERAL: Awake and " alert  HEAD: Normocephalic and atraumatic  NECK: Normal range of motion, without meningismus  EYES: Pupils Equal, Round, Reactive to Light, extraocular movements intact, conjunctiva white  ENT: Mucous membranes moist, oropharynx clear  PULMONARY: Normal effort, clear to auscultation  CARDIOVASCULAR: No murmurs, clicks or rubs, peripheral pulses 2+  ABDOMINAL: Soft, non-tender, no guarding or rigidity present, no pulsatile masses  BACK: no midline tenderness, no costovertebral tenderness  NEUROLOGICAL: Grossly non-focal neurological examination, speech normal, gait normal  EXTREMITIES: No edema, normal to inspection  SKIN: Warm and dry.  PSYCHIATRIC: Affect is appropriate    DIAGNOSTIC STUDIES / PROCEDURES  EKG      RADIOLOGY  I have independently interpreted the diagnostic imaging associated with this visit and am waiting the final reading from the radiologist.   My preliminary interpretation is as follows: pneumonia    Formal Radiologist interpretation:  DX-CHEST-2 VIEWS   Final Result      Patchy bibasilar opacities, atelectasis or infection.          COURSE & MEDICAL DECISION MAKING    ED COURSE:    INTERVENTIONS BY ME:  Medications - No data to display    Response on recheck:    1:01 AM - Patient seen and examined at bedside. Patient is a 60 year old male presenting for bloody nose and cough onset last night.  Patient has similar symptoms 2 days ago had blood work that was normal reassuring troponin and D-dimer  That was undetectable discussed repeating thse.    220 AM patient well appearing HR 98 pulse ox 91%        INITIAL ASSESSMENT, COURSE AND PLAN  Care Narrative:   Ta Stokes is a 60-year-old male with a history of atrial fibrillation on apixaban, presenting with hemoptysis and epistaxis beginning the night prior. He reports blood primarily with nasal blowing and some coughing, associated with mild shortness of breath but no chest pain or systemic symptoms. Notably, he has a 39 pack-year smoking history  and is scheduled for a chest CT as part of lung cancer screening. On examination, he is hemodynamically stable, mildly tachycardic, and mildly hypoxic with SpO? down to 91%. Chest radiograph reveals bibasilar opacities consistent with either atelectasis or infection, and his clinical picture supports a diagnosis of community-acquired pneumonia as the most likely etiology for his cough and hemoptysis.    Given his anticoagulation with apixaban, bleeding risks were carefully considered. However, there is no evidence of gross lower respiratory hemorrhage, he remains stable without anemia, and his epistaxis appears self-limited. Amoxicillin was prescribed for presumed mild bacterial pneumonia, with close outpatient follow-up advised. Differential includes pulmonary embolism, bronchiectasis, and malignancy, but the former is less likely due to a recent reassuring D-dimer and troponin, and the latter two are to be explored through planned outpatient imaging.    Patient was discharged with antibiotics and strict return precautions, and referred to primary care for further risk management and diagnostic follow-up.  ADDITIONAL PROBLEM LIST      DISPOSITION AND DISCUSSIONS  Discussion of management with other [facility] or appropriate source(s): None    I have discussed management of the patient with the following physicians and ADAM's:  None    Escalation of care considered, and ultimately not performed:Laboratory analysis and diagnostic imaging    Barriers to care at this time, including but not limited to:  None .     Decision tools and prescription drugs considered including, but not limited to:      The patient will return for new or worsening symptoms and is stable at the time of discharge.    The patient is referred to a primary physician for blood pressure management, diabetic screening, and for all other preventative health concerns.    DISPOSITION:  Patient will be discharged home in stable condition.    FOLLOW  UP:  Natalya Ortega M.D.  [address] [facility]  [address]  [facility] [id number]  [phone]          [facility], Emergency Dept  [address] [facility] [id number] [phone]    If you develop worrisome symptoms, If symptoms not improving after 48 to 72 hours      OUTPATIENT MEDICATIONS:  New Prescriptions    AMOXICILLIN (AMOXIL) 250 MG CAP    Take 1 Capsule by mouth 3 times a day for 5 days.        FINAL DIAGNOSIS  1. Acute cough    2. Community acquired pneumonia, unspecified laterality    3. Hemoptysis    4. Epistaxis        Deborah WALLER), am scribing for, and in the presence of, Christopher Murray.    Electronically signed by: Deborah Duvall), 5/3/2025    IChristopher personally performed the services described in this documentation, as scribed by Deborah White in my presence, and it is both accurate and complete.     Electronically signed by: Christopher Murray DO ,2:24 AM 05/03/25

## 2025-05-03 NOTE — ED TRIAGE NOTES
Chief Complaint   Patient presents with    Cough     Pt reports when he coughs he coughs up some blood. Pt takes blood thinners. Pt also reports he had a blood nose this morning.        Pt is alert and oriented, speaking in full sentences, follows commands and responds appropriately to questions. Resperations are even and unlabored.      Pt placed in lobby. Pt educated on triage process. Pt encouraged to alert staff for any changes.

## 2025-05-06 RX ORDER — APIXABAN 5 MG/1
5 TABLET, FILM COATED ORAL 2 TIMES DAILY
Qty: 180 TABLET | Refills: 3 | Status: SHIPPED | OUTPATIENT
Start: 2025-05-06

## 2025-05-13 ENCOUNTER — APPOINTMENT (OUTPATIENT)
Dept: PHYSICAL MEDICINE AND REHAB | Facility: MEDICAL CENTER | Age: 60
End: 2025-05-13
Payer: MEDICAID

## 2025-05-13 VITALS
BODY MASS INDEX: 38.3 KG/M2 | HEART RATE: 77 BPM | HEIGHT: 67 IN | SYSTOLIC BLOOD PRESSURE: 112 MMHG | WEIGHT: 244 LBS | TEMPERATURE: 97.5 F | OXYGEN SATURATION: 95 % | DIASTOLIC BLOOD PRESSURE: 82 MMHG

## 2025-05-13 DIAGNOSIS — M48.02 FORAMINAL STENOSIS OF CERVICAL REGION: ICD-10-CM

## 2025-05-13 DIAGNOSIS — M47.816 SPONDYLOSIS OF LUMBAR SPINE: ICD-10-CM

## 2025-05-13 DIAGNOSIS — M47.812 SPONDYLOSIS OF CERVICAL SPINE: Primary | ICD-10-CM

## 2025-05-13 DIAGNOSIS — G89.29 CHRONIC NECK PAIN: ICD-10-CM

## 2025-05-13 DIAGNOSIS — R20.0 BILATERAL HAND NUMBNESS: ICD-10-CM

## 2025-05-13 DIAGNOSIS — G56.03 BILATERAL CARPAL TUNNEL SYNDROME: ICD-10-CM

## 2025-05-13 DIAGNOSIS — M54.41 CHRONIC BILATERAL LOW BACK PAIN WITH RIGHT-SIDED SCIATICA: ICD-10-CM

## 2025-05-13 DIAGNOSIS — M54.2 CHRONIC NECK PAIN: ICD-10-CM

## 2025-05-13 DIAGNOSIS — G89.29 CHRONIC BILATERAL LOW BACK PAIN WITH RIGHT-SIDED SCIATICA: ICD-10-CM

## 2025-05-13 PROCEDURE — 3074F SYST BP LT 130 MM HG: CPT | Performed by: STUDENT IN AN ORGANIZED HEALTH CARE EDUCATION/TRAINING PROGRAM

## 2025-05-13 PROCEDURE — 99214 OFFICE O/P EST MOD 30 MIN: CPT | Performed by: STUDENT IN AN ORGANIZED HEALTH CARE EDUCATION/TRAINING PROGRAM

## 2025-05-13 PROCEDURE — 3079F DIAST BP 80-89 MM HG: CPT | Performed by: STUDENT IN AN ORGANIZED HEALTH CARE EDUCATION/TRAINING PROGRAM

## 2025-05-13 ASSESSMENT — PAIN SCALES - GENERAL: PAINLEVEL_OUTOF10: 8=MODERATE-SEVERE PAIN

## 2025-05-13 ASSESSMENT — PATIENT HEALTH QUESTIONNAIRE - PHQ9
SUM OF ALL RESPONSES TO PHQ QUESTIONS 1-9: 8
5. POOR APPETITE OR OVEREATING: 1 - SEVERAL DAYS
CLINICAL INTERPRETATION OF PHQ2 SCORE: 3

## 2025-05-13 ASSESSMENT — FIBROSIS 4 INDEX: FIB4 SCORE: 1.2

## 2025-05-13 NOTE — H&P (VIEW-ONLY)
[facility] (Physical Medicine and Rehabilitation)  Sports Medicine& Interventional Spine   Follow Up Patient Visit      Chief complaint:   Chief Complaint   Patient presents with    Follow-Up     SP          HISTORY        HPI  Patient identification: Ta Stokes ,  1965,   With The primary encounter diagnosis was Spondylosis of cervical spine. Diagnoses of Spondylosis of lumbar spine, Chronic neck pain, Foraminal stenosis of cervical region, Bilateral carpal tunnel syndrome, Chronic bilateral low back pain with right-sided sciatica, and Bilateral hand numbness were also pertinent to this visit.       At last visit dated 25  1. Chronic low back pain.  The patient reports chronic low back pain with a severity of 5-6 out of 10, which has worsened since the last visit. MRI of the lumbar spine shows arthritis in the lower segments and atrophy of the paraspinal muscles. Physical therapy is recommended to strengthen these muscles. Injections into the low back to numb the nerves of the joints are suggested to alleviate pain. If medial branch block injections provide short-term relief, an ablation procedure may be considered for long-term relief.      The patient is failed conservative treatments with medication management, home exercise program from the direction of physical therapy/physician including the past 8 weeks .  I have ordered a BILATERAL diagnostic medial branch block targeting the L3-4, L4-5, and L5-S1 facet joints #1 and #2.  Procedure #2 is only to be done if #1 is a positive block.     The risks benefits and alternatives to this procedure were discussed and the patient wishes to proceed with the procedure. Risks include but are not limited to damage to surrounding structures, infection, bleeding, worsening of pain which can be permanent, weakness which can be permanent. Benefits include pain relief, improved function. Alternatives includes not doing the procedure.   If there are 2  positive blocks I would consider the patient for radiofrequency neurotomy of the previously blocked nerves.        2. Bilateral carpal tunnel syndrome.  The patient has moderate carpal tunnel syndrome on the right and mild on the left. He reports that his current wrist splints are no longer effective, and he experiences numbness and pain at night. Continued use of bracing is recommended, and new braces are suggested if the current ones are not helping. Potential carpal tunnel injections will be considered at follow-up.    Previous Workup:  EMG Examination Date: 2/3/2025      Impression:       This is abnormal study  There is electrodiagnostic evidence for an acute and chronic left C5-C6 radiculopathy  There is electrodiagnostic evidence for bilateral median mononeuropathy at the wrist, i.e. carpal tunnel syndrome, that is moderate on the right and mild on the left.  There is potential concern for a left ulnar mononeuropathy at the elbow i.e. cubital tunnel syndrome however this may have been technical error as there were bilateral below elbow stimulations showed minimal response.  Can consider a repeat EMG in 3 months to further evaluate this finding      Procedure history:  4/16/25 MBB targeting bilateral L3-4 L4-5, L5-S1 facet joints  4/29/25 MBB targeting bilateral L3-4 L4-5, L5-S1 facet joints  Interval History:  History of Present Illness  The patient is a 60-year-old male who presents today for repeat evaluation of chronic low back pain. The patient previously underwent bilateral medial branch blocks targeting L3-L4, L4-L5, and L5-S1 facet joints. The patient noted 100% relief of pain after the most recent medial branch block during the diagnostic phase, identifying a positive response. Given this response, it is recommended that the patient undergo radiofrequency ablation at similar levels.    He reports that the procedure was successful; however, he experienced unusual symptoms the following day. He  describes an atypical pain in his inner thigh, which resolved after a few minutes upon leg movement. Approximately 30 minutes later, he developed body chills and generalized muscle and joint aches, reminiscent of flu-like symptoms. He sought early rest but was intermittently awakened by severe calf cramps, necessitating standing and stretching for relief. These symptoms began on Wednesday evening, following the procedure on Tuesday morning. He sought medical attention at an urgent care facility, where he was referred to the emergency department due to the inability to perform certain tests. He spent the majority of the night in the emergency department until 2 or 3 AM. Upon awakening on Thursday around noon, he noticed hemoptysis, prompting a return to the emergency department. He was diagnosed with bronchitis and started on antibiotics. The following day, the hemoptysis ceased. He expresses concern about the potential for blood clots due to discontinuation of Eliquis. He also reports that his pain initially improved post-procedure but has since returned. He is currently on Eliquis and amiodarone.    Supplemental Information  He is on amiodarone for his heart condition.    FAMILY HISTORY  His mother has had multiple back surgeries and has an implant with a battery inside her hip.    MEDICATIONS  Current: Eliquis, amiodarone            PMHx:   Past Medical History:   Diagnosis Date    Arrhythmia     A-fib    Cancer (HCC) 01/2024    prostate cancer    Dental disorder     upper/lower dentures    Dyslipidemia     GERD (gastroesophageal reflux disease)     Heart burn     High cholesterol     Hypertension     Indigestion     Sleep apnea     Snoring        PSHx:   Past Surgical History:   Procedure Laterality Date    LUMBAR MEDIAL BRANCH BLOCKS Bilateral 4/29/2025    Procedure: Diagnostic medial branch blocks targeting the BILATERAL L3-4, L4-5, and L5-S1 facet joints with fluoroscopic guidance #2…Note: Diagnostic medial  branch block #2 is only be done if procedure #1 is a positive block.  This will be on a separate date from procedure #1.;  Surgeon: Satya Ponce M.D.;  [facility];  Service: Pain Management    LUMBAR MEDIAL BRANCH BLOCKS Bilateral 4/16/2025    Procedure: Diagnostic medial branch blocks targeting the BILATERAL L3-4, L4-5, and L5-S1 facet joints with fluoroscopic guidance;  Surgeon: Satya Ponce M.D.;  Location: [facility];  Service: Pain Management    EMG/NCS  2/3/2025       Family history   Family History   Problem Relation Age of Onset    Hypertension Mother     Cancer Maternal Grandmother          Medications:   Outpatient Medications Marked as Taking for the 5/13/25 encounter (Office Visit) with Mark Whitley D.O.   Medication Sig Dispense Refill    ELIQUIS 5 MG Tab Take 1 tablet by mouth twice daily 180 Tablet 3    amiodarone (CORDARONE) 200 MG Tab Take 1 Tablet by mouth 2 times a day. 60 Tablet 6    Budeson-Glycopyrrol-Formoterol (BREZTRI AEROSPHERE) 160-9-4.8 MCG/ACT Aerosol Inhale.      bisoprolol (ZEBETA) 5 MG Tab Take 1 Tablet by mouth every day. 90 Tablet 3    Empagliflozin (JARDIANCE) 10 MG Tab tablet Take 1 Tablet by mouth every day. 90 Tablet 3    atorvastatin (LIPITOR) 10 MG Tab Take 1 Tablet by mouth every evening. 90 Tablet 3    lisinopril (PRINIVIL) 5 MG Tab Take 1 tablet by mouth once daily 90 Tablet 3    tamsulosin (FLOMAX) 0.4 MG capsule Take 1 Capsule by mouth at bedtime.          Current Outpatient Medications on File Prior to Visit   Medication Sig Dispense Refill    ELIQUIS 5 MG Tab Take 1 tablet by mouth twice daily 180 Tablet 3    amiodarone (CORDARONE) 200 MG Tab Take 1 Tablet by mouth 2 times a day. 60 Tablet 6    Budeson-Glycopyrrol-Formoterol (BREZTRI AEROSPHERE) 160-9-4.8 MCG/ACT Aerosol Inhale.      bisoprolol (ZEBETA) 5 MG Tab Take 1 Tablet by mouth every day. 90 Tablet 3    Empagliflozin (JARDIANCE) 10 MG Tab tablet Take 1  Tablet by mouth every day. 90 Tablet 3    atorvastatin (LIPITOR) 10 MG Tab Take 1 Tablet by mouth every evening. 90 Tablet 3    lisinopril (PRINIVIL) 5 MG Tab Take 1 tablet by mouth once daily 90 Tablet 3    tamsulosin (FLOMAX) 0.4 MG capsule Take 1 Capsule by mouth at bedtime.       No current facility-administered medications on file prior to visit.         Allergies:   Allergies   Allergen Reactions    Metformin Diarrhea     diarrhea       Social Hx:   Social History     Socioeconomic History    Marital status: Single     Spouse name: Not on file    Number of children: Not on file    Years of education: Not on file    Highest education level: Associate degree: occupational, technical, or vocational program   Occupational History    Not on file   Tobacco Use    Smoking status: Former     Current packs/day: 0.00     Average packs/day: 1 pack/day for 39.0 years (39.0 ttl pk-yrs)     Types: Cigarettes     Start date: 1979     Quit date: 2018     Years since quittin.3    Smokeless tobacco: Never    Tobacco comments:     1ppd   Vaping Use    Vaping status: Never Used   Substance and Sexual Activity    Alcohol use: Not Currently     Comment: once in awhile, 1 beer    Drug use: No    Sexual activity: Not Currently     Partners: Female   Other Topics Concern    Not on file   Social History Narrative    Not on file     Social Drivers of Health     Financial Resource Strain: High Risk (3/22/2023)    Overall Financial Resource Strain (CARDIA)     Difficulty of Paying Living Expenses: Hard   Food Insecurity: Food Insecurity Present (3/22/2023)    Hunger Vital Sign     Worried About Running Out of Food in the Last Year: Sometimes true     Ran Out of Food in the Last Year: Never true   Transportation Needs: No Transportation Needs (3/22/2023)    PRAPARE - Transportation     Lack of Transportation (Medical): No     Lack of Transportation (Non-Medical): No   Physical Activity: Sufficiently Active (3/22/2023)     "Exercise Vital Sign     Days of Exercise per Week: 7 days     Minutes of Exercise per Session: 150+ min   Stress: Stress Concern Present (3/22/2023)    [employer] [place] [employer] - Occupational Stress Questionnaire     Feeling of Stress : To some extent   Social Connections: Moderately Isolated (3/22/2023)    Social Connection and Isolation Panel [NHANES]     Frequency of Communication with Friends and Family: More than three times a week     Frequency of Social Gatherings with Friends and Family: More than three times a week     Attends [employer] Services: Never     Active Member of Clubs or Organizations: No     Attends Club or Organization Meetings: Never     Marital Status: Living with partner   Intimate Partner Violence: Not on file   Housing Stability: Low Risk  (3/22/2023)    Housing Stability Vital Sign     Unable to Pay for Housing in the Last Year: No     Number of Places Lived in the Last Year: 1     Unstable Housing in the Last Year: No         EXAMINATION     Physical Exam:   Vitals: /82 (BP Location: Right arm, Patient Position: Sitting, BP Cuff Size: Adult)   Pulse 77   Temp 36.4 °C (97.5 °F) (Temporal)   Ht 1.702 m (5' 7\")   Wt 111 kg (244 lb)   SpO2 95%     Constitutional:   Body Habitus: Body mass index is 38.22 kg/m².  Cooperation: Fully cooperates with exam  Appearance: Well-groomed no disheveled    Respiratory-  breathing comfortable on room air, no audible wheezing  Cardiovascular- capillary refills less than 2 seconds. No lower extremity edema is noted.   Psychiatric- alert and oriented ×3. Normal affect.    MSK and Neuro:   On examination patient demonstrates decreased range of motion in lumbar flexion and extension, with significant pain during bilateral facet loading.  Strength testing reveals mild weakness in hip flexion, knee extension, ankle dorsiflexion, and ED LH extension on the right side.  He has 5 out of 5 strength in all musculature on the left " side.    Inspection: No evidence of atrophy in bilateral lower extremities throughout   ROM: decreased AROM with flexion, extension, lateral flexion, and rotation bilaterally, with pain   Palpation:   No tenderness to palpation in midline at T1-T12 levels. No tenderness to palpation in the left and right of the midline T1-L5  palpation over SI joint: negative bilaterally    palpation over buttock: negative bilaterally    palpation in hip or over the greater trochanters: negative bilaterally      Lumbar spine Special tests  Neuro tension  Straight leg test positive right, negative left    Slump test positive right, negative left      HIP  FAIR test negative bilaterally    Range of motion in the hips is within normal limits in flexion, extension, abduction, internal rotation, external rotation.    SI joint tests  Observation patient sits on one buttocks: Negative  SI joint compression negative bilaterally    SI joint distraction negative bilaterally    Thigh thrust test negative bilaterally    BRAYAN test negative bilaterally     Neuro   Key points for the international standards for neurological classification of spinal cord injury (ISNCSCI) to light touch.   Dermatome R L   L2 2 2   L3 2 2   L4 2 2   L5 2 2   S1 2 2   S2 2 2     Motor Exam Lower Extremities  ? Myotome R L   Hip flexion L2 4 5   Knee extension L3 4 5   Ankle dorsiflexion L4 4 5   Toe extension L5 5 5   Ankle plantarflexion S1 5 5       Babinski sign negative bilaterally   Clonus of the ankle negative bilaterally     Reflexes  ?  R L   Patella  2+ 2+   Achilles   2+ 2+           MEDICAL DECISION MAKING    DATA    Labs:   Lab Results   Component Value Date/Time    SODIUM 139 05/01/2025 06:08 PM    POTASSIUM 4.1 05/01/2025 06:08 PM    CHLORIDE 106 05/01/2025 06:08 PM    CO2 19 (L) 05/01/2025 06:08 PM    GLUCOSE 151 (H) 05/01/2025 06:08 PM    BUN 24 (H) 05/01/2025 06:08 PM    CREATININE 1.02 05/01/2025 06:08 PM        Lab Results   Component Value  Date/Time    PROTHROMBTM 13.2 02/01/2019 07:07 PM    INR 0.99 02/01/2019 07:07 PM        Lab Results   Component Value Date/Time    WBC 8.3 05/01/2025 06:08 PM    RBC 4.62 (L) 05/01/2025 06:08 PM    HEMOGLOBIN 14.4 05/01/2025 06:08 PM    HEMATOCRIT 42.7 05/01/2025 06:08 PM    MCV 92.4 05/01/2025 06:08 PM    MCH 31.2 05/01/2025 06:08 PM    MCHC 33.7 05/01/2025 06:08 PM    MPV 9.1 05/01/2025 06:08 PM    NEUTSPOLYS 84.50 (H) 05/01/2025 06:08 PM    LYMPHOCYTES 8.40 (L) 05/01/2025 06:08 PM    MONOCYTES 4.60 05/01/2025 06:08 PM    EOSINOPHILS 1.70 05/01/2025 06:08 PM    BASOPHILS 0.40 05/01/2025 06:08 PM        Lab Results   Component Value Date/Time    HBA1C 6.6 (A) 01/22/2025 02:56 PM          Imaging:   I personally reviewed following images    Results  Imaging  MRI of the lumbar spine shows mild narrowing of the thecal sac at L3, L4, L4-L5 secondary to epidural lipomatosis. And diffuse lumbar facet arthropathy       MRI of the cervical spine dated 12/16/2024 showed severe right foraminal narrowing at C4-C5 and bilateral foraminal narrowing at C5-C6.         I reviewed the following radiology reports                   Results for orders placed during the hospital encounter of 01/14/25    MR-CERVICAL SPINE-W/O    Impression  Stable appearance of moderate canal narrowing at C4-5 and C5-6.    Stable appearance of severe right foraminal narrowing at C4-5.    Stable appearance of bilateral moderate foraminal narrowing at C5-6.    Results for orders placed during the hospital encounter of 12/16/24    MR-CERVICAL SPINE-WITH & W/O    Impression  1.  Moderate canal narrowing at C4-5 and C5-6.    2.  Severe right foraminal narrowing at C4-5.    3.  Moderate bilateral foraminal narrowing at C5-6.                                                                               Results for orders placed during the hospital encounter of 02/01/19    CT-ABDOMEN-PELVIS WITH    Impression  1.  Asymmetric thickening of the right lower  anterior intercostal muscles with a small amount of fluid seen extending between the deep and superficial layers of the right lower chest and anterior lateral abdominal wall muscles likely representing  hematoma. A definite associated rib fracture is not seen.    2.  Fatty liver.    3.  Small left lobe hepatic cyst or hemangioma.    4.  No evidence of bowel obstruction or focal inflammatory change within the abdomen or pelvis.                     Results for orders placed in visit on 01/21/25    DX-CERVICAL SPINE-4+ VIEWS                                   Results for orders placed during the hospital encounter of 01/14/25    DX-SHOULDER 2+ LEFT    Impression  No acute osseous abnormality.              DIAGNOSIS   Visit Diagnoses     ICD-10-CM   1. Spondylosis of cervical spine  M47.812   2. Spondylosis of lumbar spine  M47.816   3. Chronic neck pain  M54.2    G89.29   4. Foraminal stenosis of cervical region  M48.02   5. Bilateral carpal tunnel syndrome  G56.03   6. Chronic bilateral low back pain with right-sided sciatica  G89.29    M54.41   7. Bilateral hand numbness  R20.0                 ASSESSMENT and PLAN:       Assessment & Plan          Follow-up  The patient will follow up in 2 weeks.        1. Chronic low back pain.  He previously underwent bilateral medial branch blocks targeting L3-L4, L4-L5, and L5-S1 facet joints, with 100% relief of pain after the most recent block. Given this positive response, radiofrequency ablation at similar levels is recommended to provide long-term pain relief. Clearance from his cardiologist is required due to his current medications, Eliquis and amiodarone.    2. Bilateral carpal tunnel syndrome.  The patient has moderate carpal tunnel syndrome on the right and mild on the left. He reports that his current wrist splints are no longer effective, and he experiences numbness and pain at night. Continued use of bracing is recommended, and new braces are suggested if the current  ones are not helping. Potential carpal tunnel injections will be considered at follow-up.    PROCEDURE  The patient previously underwent bilateral medial branch blocks targeting L3-L4, L4-L5, and L5-S1 facet joints.        Dieudonne was seen today for follow-up.    Diagnoses and all orders for this visit:    Spondylosis of cervical spine    Spondylosis of lumbar spine  -     Pain Hospital Procedure; Future    Chronic neck pain    Foraminal stenosis of cervical region    Bilateral carpal tunnel syndrome    Chronic bilateral low back pain with right-sided sciatica    Bilateral hand numbness                    Follow up: after above procedures    Thank you for allowing me to participate in the care of this patient. If you have any questions please not hesitate to contact me.             Please note that this dictation was created using voice recognition software. I have made every reasonable attempt to correct obvious errors but there may be errors of grammar and content that I may have overlooked prior to finalization of this note.    Mark Whitley DO  Physical Medicine and Rehabilitation  Sports Medicine and Spine  [facility]

## 2025-05-13 NOTE — PROGRESS NOTES
[facility] (Physical Medicine and Rehabilitation)  Sports Medicine& Interventional Spine   Follow Up Patient Visit      Chief complaint:   Chief Complaint   Patient presents with    Follow-Up     SP          HISTORY        HPI  Patient identification: Ta Stokes ,  1965,   With The primary encounter diagnosis was Spondylosis of cervical spine. Diagnoses of Spondylosis of lumbar spine, Chronic neck pain, Foraminal stenosis of cervical region, Bilateral carpal tunnel syndrome, Chronic bilateral low back pain with right-sided sciatica, and Bilateral hand numbness were also pertinent to this visit.       At last visit dated 25  1. Chronic low back pain.  The patient reports chronic low back pain with a severity of 5-6 out of 10, which has worsened since the last visit. MRI of the lumbar spine shows arthritis in the lower segments and atrophy of the paraspinal muscles. Physical therapy is recommended to strengthen these muscles. Injections into the low back to numb the nerves of the joints are suggested to alleviate pain. If medial branch block injections provide short-term relief, an ablation procedure may be considered for long-term relief.      The patient is failed conservative treatments with medication management, home exercise program from the direction of physical therapy/physician including the past 8 weeks .  I have ordered a BILATERAL diagnostic medial branch block targeting the L3-4, L4-5, and L5-S1 facet joints #1 and #2.  Procedure #2 is only to be done if #1 is a positive block.     The risks benefits and alternatives to this procedure were discussed and the patient wishes to proceed with the procedure. Risks include but are not limited to damage to surrounding structures, infection, bleeding, worsening of pain which can be permanent, weakness which can be permanent. Benefits include pain relief, improved function. Alternatives includes not doing the procedure.   If there are 2  positive blocks I would consider the patient for radiofrequency neurotomy of the previously blocked nerves.        2. Bilateral carpal tunnel syndrome.  The patient has moderate carpal tunnel syndrome on the right and mild on the left. He reports that his current wrist splints are no longer effective, and he experiences numbness and pain at night. Continued use of bracing is recommended, and new braces are suggested if the current ones are not helping. Potential carpal tunnel injections will be considered at follow-up.    Previous Workup:  EMG Examination Date: 2/3/2025      Impression:       This is abnormal study  There is electrodiagnostic evidence for an acute and chronic left C5-C6 radiculopathy  There is electrodiagnostic evidence for bilateral median mononeuropathy at the wrist, i.e. carpal tunnel syndrome, that is moderate on the right and mild on the left.  There is potential concern for a left ulnar mononeuropathy at the elbow i.e. cubital tunnel syndrome however this may have been technical error as there were bilateral below elbow stimulations showed minimal response.  Can consider a repeat EMG in 3 months to further evaluate this finding      Procedure history:  4/16/25 MBB targeting bilateral L3-4 L4-5, L5-S1 facet joints  4/29/25 MBB targeting bilateral L3-4 L4-5, L5-S1 facet joints  Interval History:  History of Present Illness  The patient is a 60-year-old male who presents today for repeat evaluation of chronic low back pain. The patient previously underwent bilateral medial branch blocks targeting L3-L4, L4-L5, and L5-S1 facet joints. The patient noted 100% relief of pain after the most recent medial branch block during the diagnostic phase, identifying a positive response. Given this response, it is recommended that the patient undergo radiofrequency ablation at similar levels.    He reports that the procedure was successful; however, he experienced unusual symptoms the following day. He  describes an atypical pain in his inner thigh, which resolved after a few minutes upon leg movement. Approximately 30 minutes later, he developed body chills and generalized muscle and joint aches, reminiscent of flu-like symptoms. He sought early rest but was intermittently awakened by severe calf cramps, necessitating standing and stretching for relief. These symptoms began on Wednesday evening, following the procedure on Tuesday morning. He sought medical attention at an urgent care facility, where he was referred to the emergency department due to the inability to perform certain tests. He spent the majority of the night in the emergency department until 2 or 3 AM. Upon awakening on Thursday around noon, he noticed hemoptysis, prompting a return to the emergency department. He was diagnosed with bronchitis and started on antibiotics. The following day, the hemoptysis ceased. He expresses concern about the potential for blood clots due to discontinuation of Eliquis. He also reports that his pain initially improved post-procedure but has since returned. He is currently on Eliquis and amiodarone.    Supplemental Information  He is on amiodarone for his heart condition.    FAMILY HISTORY  His mother has had multiple back surgeries and has an implant with a battery inside her hip.    MEDICATIONS  Current: Eliquis, amiodarone            PMHx:   Past Medical History:   Diagnosis Date    Arrhythmia     A-fib    Cancer (HCC) 01/2024    prostate cancer    Dental disorder     upper/lower dentures    Dyslipidemia     GERD (gastroesophageal reflux disease)     Heart burn     High cholesterol     Hypertension     Indigestion     Sleep apnea     Snoring        PSHx:   Past Surgical History:   Procedure Laterality Date    LUMBAR MEDIAL BRANCH BLOCKS Bilateral 4/29/2025    Procedure: Diagnostic medial branch blocks targeting the BILATERAL L3-4, L4-5, and L5-S1 facet joints with fluoroscopic guidance #2…Note: Diagnostic medial  branch block #2 is only be done if procedure #1 is a positive block.  This will be on a separate date from procedure #1.;  Surgeon: Satya Ponce M.D.;  [facility];  Service: Pain Management    LUMBAR MEDIAL BRANCH BLOCKS Bilateral 4/16/2025    Procedure: Diagnostic medial branch blocks targeting the BILATERAL L3-4, L4-5, and L5-S1 facet joints with fluoroscopic guidance;  Surgeon: Satya Ponce M.D.;  [facility];  Service: Pain Management    EMG/NCS  2/3/2025       Family history   Family History   Problem Relation Age of Onset    Hypertension Mother     Cancer Maternal Grandmother          Medications:   Outpatient Medications Marked as Taking for the 5/13/25 encounter (Office Visit) with Mark Whitley D.O.   Medication Sig Dispense Refill    ELIQUIS 5 MG Tab Take 1 tablet by mouth twice daily 180 Tablet 3    amiodarone (CORDARONE) 200 MG Tab Take 1 Tablet by mouth 2 times a day. 60 Tablet 6    Budeson-Glycopyrrol-Formoterol (BREZTRI AEROSPHERE) 160-9-4.8 MCG/ACT Aerosol Inhale.      bisoprolol (ZEBETA) 5 MG Tab Take 1 Tablet by mouth every day. 90 Tablet 3    Empagliflozin (JARDIANCE) 10 MG Tab tablet Take 1 Tablet by mouth every day. 90 Tablet 3    atorvastatin (LIPITOR) 10 MG Tab Take 1 Tablet by mouth every evening. 90 Tablet 3    lisinopril (PRINIVIL) 5 MG Tab Take 1 tablet by mouth once daily 90 Tablet 3    tamsulosin (FLOMAX) 0.4 MG capsule Take 1 Capsule by mouth at bedtime.          Current Outpatient Medications on File Prior to Visit   Medication Sig Dispense Refill    ELIQUIS 5 MG Tab Take 1 tablet by mouth twice daily 180 Tablet 3    amiodarone (CORDARONE) 200 MG Tab Take 1 Tablet by mouth 2 times a day. 60 Tablet 6    Budeson-Glycopyrrol-Formoterol (BREZTRI AEROSPHERE) 160-9-4.8 MCG/ACT Aerosol Inhale.      bisoprolol (ZEBETA) 5 MG Tab Take 1 Tablet by mouth every day. 90 Tablet 3    Empagliflozin (JARDIANCE) 10 MG Tab tablet Take 1  Tablet by mouth every day. 90 Tablet 3    atorvastatin (LIPITOR) 10 MG Tab Take 1 Tablet by mouth every evening. 90 Tablet 3    lisinopril (PRINIVIL) 5 MG Tab Take 1 tablet by mouth once daily 90 Tablet 3    tamsulosin (FLOMAX) 0.4 MG capsule Take 1 Capsule by mouth at bedtime.       No current facility-administered medications on file prior to visit.         Allergies:   Allergies   Allergen Reactions    Metformin Diarrhea     diarrhea       Social Hx:   Social History     Socioeconomic History    Marital status: Single     Spouse name: Not on file    Number of children: Not on file    Years of education: Not on file    Highest education level: Associate degree: occupational, technical, or vocational program   Occupational History    Not on file   Tobacco Use    Smoking status: Former     Current packs/day: 0.00     Average packs/day: 1 pack/day for 39.0 years (39.0 ttl pk-yrs)     Types: Cigarettes     Start date: 1979     Quit date: 2018     Years since quittin.3    Smokeless tobacco: Never    Tobacco comments:     1ppd   Vaping Use    Vaping status: Never Used   Substance and Sexual Activity    Alcohol use: Not Currently     Comment: once in awhile, 1 beer    Drug use: No    Sexual activity: Not Currently     Partners: Female   Other Topics Concern    Not on file   Social History Narrative    Not on file     Social Drivers of Health     Financial Resource Strain: High Risk (3/22/2023)    Overall Financial Resource Strain (CARDIA)     Difficulty of Paying Living Expenses: Hard   Food Insecurity: Food Insecurity Present (3/22/2023)    Hunger Vital Sign     Worried About Running Out of Food in the Last Year: Sometimes true     Ran Out of Food in the Last Year: Never true   Transportation Needs: No Transportation Needs (3/22/2023)    PRAPARE - Transportation     Lack of Transportation (Medical): No     Lack of Transportation (Non-Medical): No   Physical Activity: Sufficiently Active (3/22/2023)     "Exercise Vital Sign     Days of Exercise per Week: 7 days     Minutes of Exercise per Session: 150+ min   Stress: Stress Concern Present (3/22/2023)    [employer] [place] [employer] - Occupational Stress Questionnaire     Feeling of Stress : To some extent   Social Connections: Moderately Isolated (3/22/2023)    Social Connection and Isolation Panel [NHANES]     Frequency of Communication with Friends and Family: More than three times a week     Frequency of Social Gatherings with Friends and Family: More than three times a week     Attends Yarsani Services: Never     Active Member of Clubs or Organizations: No     Attends Club or Organization Meetings: Never     Marital Status: Living with partner   Intimate Partner Violence: Not on file   Housing Stability: Low Risk  (3/22/2023)    Housing Stability Vital Sign     Unable to Pay for Housing in the Last Year: No     Number of Places Lived in the Last Year: 1     Unstable Housing in the Last Year: No         EXAMINATION     Physical Exam:   Vitals: /82 (BP Location: Right arm, Patient Position: Sitting, BP Cuff Size: Adult)   Pulse 77   Temp 36.4 °C (97.5 °F) (Temporal)   Ht 1.702 m (5' 7\")   Wt 111 kg (244 lb)   SpO2 95%     Constitutional:   Body Habitus: Body mass index is 38.22 kg/m².  Cooperation: Fully cooperates with exam  Appearance: Well-groomed no disheveled    Respiratory-  breathing comfortable on room air, no audible wheezing  Cardiovascular- capillary refills less than 2 seconds. No lower extremity edema is noted.   Psychiatric- alert and oriented ×3. Normal affect.    MSK and Neuro:   On examination patient demonstrates decreased range of motion in lumbar flexion and extension, with significant pain during bilateral facet loading.  Strength testing reveals mild weakness in hip flexion, knee extension, ankle dorsiflexion, and ED LH extension on the right side.  He has 5 out of 5 strength in all musculature on the left " side.    Inspection: No evidence of atrophy in bilateral lower extremities throughout   ROM: decreased AROM with flexion, extension, lateral flexion, and rotation bilaterally, with pain   Palpation:   No tenderness to palpation in midline at T1-T12 levels. No tenderness to palpation in the left and right of the midline T1-L5  palpation over SI joint: negative bilaterally    palpation over buttock: negative bilaterally    palpation in hip or over the greater trochanters: negative bilaterally      Lumbar spine Special tests  Neuro tension  Straight leg test positive right, negative left    Slump test positive right, negative left      HIP  FAIR test negative bilaterally    Range of motion in the hips is within normal limits in flexion, extension, abduction, internal rotation, external rotation.    SI joint tests  Observation patient sits on one buttocks: Negative  SI joint compression negative bilaterally    SI joint distraction negative bilaterally    Thigh thrust test negative bilaterally    BRAYAN test negative bilaterally     Neuro   Key points for the international standards for neurological classification of spinal cord injury (ISNCSCI) to light touch.   Dermatome R L   L2 2 2   L3 2 2   L4 2 2   L5 2 2   S1 2 2   S2 2 2     Motor Exam Lower Extremities  ? Myotome R L   Hip flexion L2 4 5   Knee extension L3 4 5   Ankle dorsiflexion L4 4 5   Toe extension L5 5 5   Ankle plantarflexion S1 5 5       Babinski sign negative bilaterally   Clonus of the ankle negative bilaterally     Reflexes  ?  R L   Patella  2+ 2+   Achilles   2+ 2+           MEDICAL DECISION MAKING    DATA    Labs:   Lab Results   Component Value Date/Time    SODIUM 139 05/01/2025 06:08 PM    POTASSIUM 4.1 05/01/2025 06:08 PM    CHLORIDE 106 05/01/2025 06:08 PM    CO2 19 (L) 05/01/2025 06:08 PM    GLUCOSE 151 (H) 05/01/2025 06:08 PM    BUN 24 (H) 05/01/2025 06:08 PM    CREATININE 1.02 05/01/2025 06:08 PM        Lab Results   Component Value  Date/Time    PROTHROMBTM 13.2 02/01/2019 07:07 PM    INR 0.99 02/01/2019 07:07 PM        Lab Results   Component Value Date/Time    WBC 8.3 05/01/2025 06:08 PM    RBC 4.62 (L) 05/01/2025 06:08 PM    HEMOGLOBIN 14.4 05/01/2025 06:08 PM    HEMATOCRIT 42.7 05/01/2025 06:08 PM    MCV 92.4 05/01/2025 06:08 PM    MCH 31.2 05/01/2025 06:08 PM    MCHC 33.7 05/01/2025 06:08 PM    MPV 9.1 05/01/2025 06:08 PM    NEUTSPOLYS 84.50 (H) 05/01/2025 06:08 PM    LYMPHOCYTES 8.40 (L) 05/01/2025 06:08 PM    MONOCYTES 4.60 05/01/2025 06:08 PM    EOSINOPHILS 1.70 05/01/2025 06:08 PM    BASOPHILS 0.40 05/01/2025 06:08 PM        Lab Results   Component Value Date/Time    HBA1C 6.6 (A) 01/22/2025 02:56 PM          Imaging:   I personally reviewed following images    Results  Imaging  MRI of the lumbar spine shows mild narrowing of the thecal sac at L3, L4, L4-L5 secondary to epidural lipomatosis. And diffuse lumbar facet arthropathy       MRI of the cervical spine dated 12/16/2024 showed severe right foraminal narrowing at C4-C5 and bilateral foraminal narrowing at C5-C6.         I reviewed the following radiology reports                   Results for orders placed during the hospital encounter of 01/14/25    MR-CERVICAL SPINE-W/O    Impression  Stable appearance of moderate canal narrowing at C4-5 and C5-6.    Stable appearance of severe right foraminal narrowing at C4-5.    Stable appearance of bilateral moderate foraminal narrowing at C5-6.    Results for orders placed during the hospital encounter of 12/16/24    MR-CERVICAL SPINE-WITH & W/O    Impression  1.  Moderate canal narrowing at C4-5 and C5-6.    2.  Severe right foraminal narrowing at C4-5.    3.  Moderate bilateral foraminal narrowing at C5-6.                                                                               Results for orders placed during the hospital encounter of 02/01/19    CT-ABDOMEN-PELVIS WITH    Impression  1.  Asymmetric thickening of the right lower  anterior intercostal muscles with a small amount of fluid seen extending between the deep and superficial layers of the right lower chest and anterior lateral abdominal wall muscles likely representing  hematoma. A definite associated rib fracture is not seen.    2.  Fatty liver.    3.  Small left lobe hepatic cyst or hemangioma.    4.  No evidence of bowel obstruction or focal inflammatory change within the abdomen or pelvis.                     Results for orders placed in visit on 01/21/25    DX-CERVICAL SPINE-4+ VIEWS                                   Results for orders placed during the hospital encounter of 01/14/25    DX-SHOULDER 2+ LEFT    Impression  No acute osseous abnormality.              DIAGNOSIS   Visit Diagnoses     ICD-10-CM   1. Spondylosis of cervical spine  M47.812   2. Spondylosis of lumbar spine  M47.816   3. Chronic neck pain  M54.2    G89.29   4. Foraminal stenosis of cervical region  M48.02   5. Bilateral carpal tunnel syndrome  G56.03   6. Chronic bilateral low back pain with right-sided sciatica  G89.29    M54.41   7. Bilateral hand numbness  R20.0                 ASSESSMENT and PLAN:       Assessment & Plan          Follow-up  The patient will follow up in 2 weeks.        1. Chronic low back pain.  He previously underwent bilateral medial branch blocks targeting L3-L4, L4-L5, and L5-S1 facet joints, with 100% relief of pain after the most recent block. Given this positive response, radiofrequency ablation at similar levels is recommended to provide long-term pain relief. Clearance from his cardiologist is required due to his current medications, Eliquis and amiodarone.    2. Bilateral carpal tunnel syndrome.  The patient has moderate carpal tunnel syndrome on the right and mild on the left. He reports that his current wrist splints are no longer effective, and he experiences numbness and pain at night. Continued use of bracing is recommended, and new braces are suggested if the current  ones are not helping. Potential carpal tunnel injections will be considered at follow-up.    PROCEDURE  The patient previously underwent bilateral medial branch blocks targeting L3-L4, L4-L5, and L5-S1 facet joints.        Dieudonne was seen today for follow-up.    Diagnoses and all orders for this visit:    Spondylosis of cervical spine    Spondylosis of lumbar spine  -     Pain Hospital Procedure; Future    Chronic neck pain    Foraminal stenosis of cervical region    Bilateral carpal tunnel syndrome    Chronic bilateral low back pain with right-sided sciatica    Bilateral hand numbness                    Follow up: after above procedures    Thank you for allowing me to participate in the care of this patient. If you have any questions please not hesitate to contact me.             Please note that this dictation was created using voice recognition software. I have made every reasonable attempt to correct obvious errors but there may be errors of grammar and content that I may have overlooked prior to finalization of this note.    Mark Whitley DO  Physical Medicine and Rehabilitation  Sports Medicine and Spine  [facility]

## 2025-05-15 RX ORDER — DICLOFENAC SODIUM 75 MG/1
75 TABLET, DELAYED RELEASE ORAL 2 TIMES DAILY
COMMUNITY
End: 2025-05-27 | Stop reason: SDUPTHER

## 2025-05-15 NOTE — Clinical Note
REFERRAL APPROVAL NOTICE         Sent on May 15, 2025                   Dieudonnedilan Stokes  [address] [facility] [address]                   Dear Mr. Stokes,    After a careful review of the medical information and benefit coverage, [facility] has processed your referral. See below for additional details.    If applicable, you must be actively enrolled with your insurance for coverage of the authorized service. If you have any questions regarding your coverage, please contact your insurance directly.    REFERRAL INFORMATION   Referral #:  [id number]  Referred-To Department    Referred-By Provider:  Pain Management    Mark Whitley D.O.   Pain Management       [address]  [place] [address]  [facility] [address]  [phone] [facility] [facility] [address]  [phone]    Referral Start Date:  05/15/2025  Referral End Date:   08/15/2025             SCHEDULING  If you do not already have an appointment, please call [phone] to make an appointment.     MORE INFORMATION  If you do not already have a [employer] account, sign up at: [email or web address]  You can access your medical information, make appointments, see lab results, billing information, and more.  If you have questions regarding this referral, please contact  the [facility] Referrals department at:             [phone]. Monday - Friday 8:00AM - 5:00PM.     Sincerely,    [facility]

## 2025-05-20 ENCOUNTER — HOSPITAL ENCOUNTER (OUTPATIENT)
Facility: REHABILITATION | Age: 60
End: 2025-05-20
Attending: PHYSICAL MEDICINE & REHABILITATION | Admitting: PHYSICAL MEDICINE & REHABILITATION
Payer: MEDICAID

## 2025-05-20 ENCOUNTER — APPOINTMENT (OUTPATIENT)
Dept: RADIOLOGY | Facility: REHABILITATION | Age: 60
End: 2025-05-20
Attending: PHYSICAL MEDICINE & REHABILITATION
Payer: MEDICAID

## 2025-05-20 VITALS
RESPIRATION RATE: 16 BRPM | BODY MASS INDEX: 38.06 KG/M2 | WEIGHT: 242.51 LBS | TEMPERATURE: 98.2 F | DIASTOLIC BLOOD PRESSURE: 66 MMHG | HEART RATE: 72 BPM | SYSTOLIC BLOOD PRESSURE: 105 MMHG | OXYGEN SATURATION: 92 % | HEIGHT: 67 IN

## 2025-05-20 PROCEDURE — 64636 DESTROY L/S FACET JNT ADDL: CPT

## 2025-05-20 PROCEDURE — 99153 MOD SED SAME PHYS/QHP EA: CPT

## 2025-05-20 PROCEDURE — 700111 HCHG RX REV CODE 636 W/ 250 OVERRIDE (IP): Mod: JZ

## 2025-05-20 PROCEDURE — 64635 DESTROY LUMB/SAC FACET JNT: CPT

## 2025-05-20 PROCEDURE — 99152 MOD SED SAME PHYS/QHP 5/>YRS: CPT

## 2025-05-20 RX ORDER — ROPIVACAINE HYDROCHLORIDE 5 MG/ML
INJECTION, SOLUTION EPIDURAL; INFILTRATION; PERINEURAL
Status: COMPLETED
Start: 2025-05-20 | End: 2025-05-20

## 2025-05-20 RX ORDER — MIDAZOLAM HYDROCHLORIDE 1 MG/ML
INJECTION INTRAMUSCULAR; INTRAVENOUS
Status: COMPLETED
Start: 2025-05-20 | End: 2025-05-20

## 2025-05-20 RX ORDER — LIDOCAINE HYDROCHLORIDE 10 MG/ML
INJECTION, SOLUTION EPIDURAL; INFILTRATION; INTRACAUDAL; PERINEURAL
Status: COMPLETED
Start: 2025-05-20 | End: 2025-05-20

## 2025-05-20 RX ADMIN — FENTANYL CITRATE 25 MCG: 50 INJECTION, SOLUTION INTRAMUSCULAR; INTRAVENOUS at 10:12

## 2025-05-20 RX ADMIN — LIDOCAINE HYDROCHLORIDE 30 ML: 10 INJECTION, SOLUTION EPIDURAL; INFILTRATION; INTRACAUDAL; PERINEURAL at 10:14

## 2025-05-20 RX ADMIN — MIDAZOLAM HYDROCHLORIDE 0.5 MG: 1 INJECTION, SOLUTION INTRAMUSCULAR; INTRAVENOUS at 10:13

## 2025-05-20 RX ADMIN — ROPIVACAINE HYDROCHLORIDE 30 ML: 5 INJECTION, SOLUTION EPIDURAL; INFILTRATION; PERINEURAL at 10:14

## 2025-05-20 ASSESSMENT — PAIN DESCRIPTION - PAIN TYPE
TYPE: CHRONIC PAIN

## 2025-05-20 ASSESSMENT — FIBROSIS 4 INDEX: FIB4 SCORE: 1.2

## 2025-05-20 NOTE — INTERVAL H&P NOTE
Consented Procedure: RIGHT and LEFT radiofrequency neurotomies medial branch targeting the L3-4, L4-5, and L5-S1 facet joints with fluoroscopic guidance and sedation    I have examined the patient, provided the risks, benefits, and alternatives to the procedure(s) indicated on the signed consent form, and the patient wishes to proceed.    H&P reviewed. The patient was examined and there are no changes to the H&P      Satya Ponce M.D.  05/20/25 9:38 AM

## 2025-05-20 NOTE — PROGRESS NOTES
0903 Pt received to pre procedure area. ID band and allergies verified. Vital signs taken and stable. Verified that patient has not taken NSAIDS, anticoagulants or blood thinners in past 5 days. Pt's history reviewed. Reviewed post op instructions with patient, questions answered, verbalized understanding. Pt seen by Dr. Ponce pre procedure discussed, questions answered.     1045  Pt received to recovery area, report received from procedure RN Paulette . Vitals taken and stable. Patient tolerated po fluids and snack without difficulty. Dressing clean, dry and intact. Ice pack applied over dressing.     1107 Pt seen by Dr. Ponce post procedure, orders received for discharge. Patient ambulatory without difficulty. Pt discharged to designated .

## 2025-05-20 NOTE — OP REPORT
Patient: Ta Stokes 60 y.o. male MRN: [id number]     Date of Service: 5/20/2025     Physician/s: Satya Ponce MD    Pre-operative Diagnosis: Lumbar spondylosis, facet arthropathy.      Post-operative Diagnosis: Lumbar spondylosis, facet arthropathy.     Procedure: Medial Branch Radiofrequency neurotomy targeting the right and left L3-4, L4-5, and L5-S1 facet joint(s) with sedation.     Description of procedure:    The patient was not treated for radiculopathy at this time    The risks, benefits, and alternatives of the procedure were reviewed and discussed with the patient.  Written informed consent was freely obtained. A pre-procedural time-out was conducted by the physician verifying patient’s identity, procedure to be performed, procedure site and side, and allergy verification. Appropriate equipment was determined to be in place for the procedure.     Moderation sedation was achieved with Versed (0.5mg) and Fentanyl (25mcg). Monitoring of the patients vital signs and respiratory status was provided by trained independent registered nurse during the entire course of the procedures and under my supervision and recoded in the patient’s medical record. The duration of sedation was over 10 minutes.     The patient's vital signs were carefully monitored before, throughout, and after the procedure.     In the fluoroscopy suite the patient was placed in a prone position, and a pillow was placed underneath the level of the umbilicus. The skin was prepped and draped in the usual sterile fashion. The fluoroscope was placed over the low back at the appropriate angles, and the targets for needle/probe placement were marked. A 25g needle was placed into each of the markings at three levels, and approx 1cc of 1% Lidocaine was injected subcutaneously into the epidermal and dermal layers. The needle was removed.     A 18 guage, 14.5 cm RFN cannula with a 10 mm active tip was then placed into the skin using fluoroscopic  guidance and advanced with an oblique view towards the intersection of the transverse process and superior articular process L3-4 facet joint where the L2 medial branch runs  levels on the left side, where the medial branch runs. The needle/probe tips were then verified by AP, oblique, and lateral views.     A 18 guage, 14.5 cm RFN cannula with a 10 mm active tip was then placed into the skin using fluoroscopic guidance and advanced with an oblique view towards the intersection of the transverse process and superior articular process L4-5 facet joint where the L3 medial branch runs levels on the left side, where the medial branch runs. The needle/probe tips were then verified by AP, oblique, and lateral views.     Then A 18 guage, 14.5 cm RFN cannula with a 10 mm active tip was then placed into the skin using fluoroscopic guidance and advanced with an oblique view towards the intersection of the transverse process and superior articular process L5-S1 facet joint where the L4 medial branch runs  levels on the left side, where the medial branch runs. The needle/probe tips were then verified by AP, oblique, and lateral views.     Then A 18 guage, 14.5 cm RFN cannula with a 10 mm active tip was then placed into the skin using fluoroscopic guidance and advanced with an oblique view towards the intersection of the transverse process and superior articular process on the S1 facet where the L5 dorsal ramus runs on the left side, where the medial branch runs. The needle/probe tips were then verified by AP, oblique, and lateral views.       Motor stimulation is used as an extra precaution to ensure the needle tips are off the lumbar nerve roots prior to each lesion. Following negative aspiration, a syringe was prepared with 10 mL of 1% lidocaine.  2mL of this syringe was injected at each level.  The needles are not moved, but fluoroscope guidance is used to ensure the needles have not moved. After a wait period of  approximately 2 minutes, a radiofrequency lesion was then created at each level with a temperature of 80 degrees centigrade for 90 seconds.     The probes were adjusted to a 2nd location and images were saved in 2+ views views. Motor testing was done which confirmed no twitching in the leg.  And another radiofrequency lesion was made of 80 °C for 90 seconds. A 2nd radiofrequency lesion was made with 80°C for 90 seconds.      The cannulas were restyletted, and were then removed intact.     A 18 guage, 14.5 cm RFN cannula with a 10 mm active tip was then placed into the skin using fluoroscopic guidance and advanced with an oblique view towards the intersection of the transverse process and superior articular process L3-4 facet joint where the L2 medial branch runs  levels on the right side, where the medial branch runs. The needle/probe tips were then verified by AP, oblique, and lateral views.     A 18 guage, 14.5 cm RFN cannula with a 10 mm active tip was then placed into the skin using fluoroscopic guidance and advanced with an oblique view towards the intersection of the transverse process and superior articular process L4-5 facet joint where the L3 medial branch runs levels on the right side, where the medial branch runs. The needle/probe tips were then verified by AP, oblique, and lateral views.     Then A 18 guage, 14.5 cm RFN cannula with a 10 mm active tip was then placed into the skin using fluoroscopic guidance and advanced with an oblique view towards the intersection of the transverse process and superior articular process L5-S1 facet joint where the L4 medial branch runs  levels on the right side, where the medial branch runs. The needle/probe tips were then verified by AP, oblique, and lateral views.     Then A 18 guage, 14.5 cm RFN cannula with a 10 mm active tip was then placed into the skin using fluoroscopic guidance and advanced with an oblique view towards the intersection of the transverse  process and superior articular process on the S1 facet where the L5 dorsal ramus runs on the right side, where the medial branch runs. The needle/probe tips were then verified by AP, oblique, and lateral views.       Motor stimulation is used as an extra precaution to ensure the needle tips are off the lumbar nerve roots prior to each lesion. Following negative aspiration, a syringe was prepared with 10 mL of 1% lidocaine.  2mL of this syringe was injected at each level.  The needles are not moved, but fluoroscope guidance is used to ensure the needles have not moved. After a wait period of approximately 2 minutes, a radiofrequency lesion was then created at each level with a temperature of 80 degrees centigrade for 90 seconds.     The probes were adjusted to a 2nd location and images were saved in 2+ views views. Motor testing was done which confirmed no twitching in the leg.  And another radiofrequency lesion was made of 80 °C for 90 seconds. A 2nd radiofrequency lesion was made with 80°C for 90 seconds.      The cannulas were restyletted, and were then removed intact.     Fluoroscopic images in AP and lateral view were saved prior to each radiofrequency neurotomy.    The patient's back was covered with a 4x4 gauze, the area was cleansed with sterile normal saline, and a dressing was applied. There were no complications noted, the patient remained hemodynamically stable, and the patient tolerated the procedure well. The patient was examined in the postoperative area and his strength exam was identical as prior to the procedure.    This was a challenging procedure given the patients Body mass index is 37.98 kg/m².. This required longer needles.  A typical procedure such as this would require 10cm needles.  This procedure required 14.5cm needles.  This added to the mental effort for complexity this procedure.  This also made acquiring fluoroscopic images more time-consuming and challenging.  Final fluoroscopic  images were obtained and saved.     The patient had 100 percent pain relief postprocedure  Preprocedure pain 8/10 NRS  Postprocedure pain 0 /10 NRS     Follow-up as scheduled    Satya Ponce MD  Interventional Spine and Pain  Physical Medicine and Rehabilitation  [facility]            CPT  Radiofrequency ablation (RFA) - lumbar or sacral (1st joint):  01597-33-90  Radiofrequency ablation (RFA) - lumbar or sacral (each additional joint):  64636-50-22 x 2   moderate procedural sedation first 15 minutes: [id number]

## 2025-05-20 NOTE — OR SURGEON
Immediate Post OP Note    Pre-Op Diagnosis Codes:      * Spondylosis of lumbar spine [M47.816]      Post-Op Diagnosis Codes:     * Spondylosis of lumbar spine [M47.816]      Procedure(s):  RIGHT and LEFT radiofrequency neurotomies medial branch targeting the L3-4, L4-5, and L5-S1 facet joints with fluoroscopic guidance and sedation    sedation with 0.5mg versed and 25 mcg fentanyl.     - Wound Class: Clean    Surgeon(s):  Satya Ponce M.D.    Anesthesiologist/Type of Anesthesia:  No anesthesia staff entered./Local    Surgical Staff:  Circulator: Lanette Xavier R.N.  Scrub Person: Winnie Marquez R.N.  Radiology Technologist: Bernardino Painter    Specimens removed if any:  * No specimens in log *    Estimated Blood Loss: None    Findings: None    Complications: None        5/20/2025 11:28 AM Satya Ponce M.D.

## 2025-05-22 ENCOUNTER — TELEPHONE (OUTPATIENT)
Dept: PHYSICAL MEDICINE AND REHAB | Facility: MEDICAL CENTER | Age: 60
End: 2025-05-22
Payer: MEDICAID

## 2025-05-23 ENCOUNTER — PATIENT MESSAGE (OUTPATIENT)
Dept: MEDICAL GROUP | Facility: MEDICAL CENTER | Age: 60
End: 2025-05-23
Payer: MEDICAID

## 2025-05-23 ENCOUNTER — TELEPHONE (OUTPATIENT)
Dept: PHYSICAL MEDICINE AND REHAB | Facility: MEDICAL CENTER | Age: 60
End: 2025-05-23
Payer: MEDICAID

## 2025-05-23 DIAGNOSIS — M54.12 SPINAL STENOSIS OF CERVICAL REGION WITH RADICULOPATHY: Primary | ICD-10-CM

## 2025-05-23 DIAGNOSIS — M48.02 SPINAL STENOSIS OF CERVICAL REGION WITH RADICULOPATHY: Primary | ICD-10-CM

## 2025-05-23 NOTE — TELEPHONE ENCOUNTER
Called for post-sp check-up. Pt reported the following regarding the procedure site:    Change in pain?: LVM    Concerns?: LVM    Confirmed [facility] appt?: LVM, 7/1 w Dr. Whitley

## 2025-05-27 PROCEDURE — RXMED WILLOW AMBULATORY MEDICATION CHARGE: Performed by: FAMILY MEDICINE

## 2025-05-27 RX ORDER — DICLOFENAC SODIUM 75 MG/1
75 TABLET, DELAYED RELEASE ORAL 2 TIMES DAILY
Qty: 60 TABLET | Refills: 1 | Status: SHIPPED | OUTPATIENT
Start: 2025-05-27

## 2025-05-27 NOTE — PATIENT COMMUNICATION
Received request via: Patient    Was the patient seen in the last year in this department? Yes    Does the patient have an active prescription (recently filled or refills available) for medication(s) requested? No    Pharmacy Name: [facility]    Does the patient have assisted Plus and need 100-day supply? (This applies to ALL medications) Patient does not have SCP

## 2025-05-28 ENCOUNTER — PATIENT MESSAGE (OUTPATIENT)
Dept: MEDICAL GROUP | Facility: MEDICAL CENTER | Age: 60
End: 2025-05-28
Payer: MEDICAID

## 2025-05-28 DIAGNOSIS — M54.12 SPINAL STENOSIS OF CERVICAL REGION WITH RADICULOPATHY: ICD-10-CM

## 2025-05-28 DIAGNOSIS — M48.02 SPINAL STENOSIS OF CERVICAL REGION WITH RADICULOPATHY: ICD-10-CM

## 2025-06-02 ENCOUNTER — PHARMACY VISIT (OUTPATIENT)
Dept: PHARMACY | Facility: MEDICAL CENTER | Age: 60
End: 2025-06-02
Payer: COMMERCIAL

## 2025-06-02 RX ORDER — DICLOFENAC SODIUM 75 MG/1
75 TABLET, DELAYED RELEASE ORAL 2 TIMES DAILY
Qty: 60 TABLET | Refills: 1 | Status: ON HOLD | OUTPATIENT
Start: 2025-06-02 | End: 2025-06-28

## 2025-06-16 ENCOUNTER — HOSPITAL ENCOUNTER (OUTPATIENT)
Dept: RADIOLOGY | Facility: MEDICAL CENTER | Age: 60
End: 2025-06-16
Attending: INTERNAL MEDICINE
Payer: MEDICAID

## 2025-06-16 ENCOUNTER — RESULTS FOLLOW-UP (OUTPATIENT)
Dept: MEDICAL GROUP | Facility: MEDICAL CENTER | Age: 60
End: 2025-06-16

## 2025-06-16 ENCOUNTER — HOSPITAL ENCOUNTER (OUTPATIENT)
Dept: RADIOLOGY | Facility: MEDICAL CENTER | Age: 60
End: 2025-06-16
Attending: FAMILY MEDICINE
Payer: MEDICAID

## 2025-06-16 DIAGNOSIS — I83.813 VARICOSE VEINS OF BOTH LOWER EXTREMITIES WITH PAIN: ICD-10-CM

## 2025-06-16 PROCEDURE — 93970 EXTREMITY STUDY: CPT

## 2025-06-16 PROCEDURE — 93922 UPR/L XTREMITY ART 2 LEVELS: CPT | Mod: 26 | Performed by: INTERNAL MEDICINE

## 2025-06-16 PROCEDURE — 93922 UPR/L XTREMITY ART 2 LEVELS: CPT

## 2025-06-17 ENCOUNTER — RESULTS FOLLOW-UP (OUTPATIENT)
Dept: CARDIOLOGY | Facility: MEDICAL CENTER | Age: 60
End: 2025-06-17
Payer: MEDICAID

## 2025-06-17 PROCEDURE — 93970 EXTREMITY STUDY: CPT | Mod: 26 | Performed by: INTERNAL MEDICINE

## 2025-06-22 DIAGNOSIS — I10 ESSENTIAL HYPERTENSION: ICD-10-CM

## 2025-06-23 RX ORDER — LISINOPRIL 5 MG/1
TABLET ORAL
Qty: 90 TABLET | Refills: 3 | Status: ON HOLD | OUTPATIENT
Start: 2025-06-23 | End: 2025-06-28

## 2025-06-26 ENCOUNTER — HOSPITAL ENCOUNTER (INPATIENT)
Facility: MEDICAL CENTER | Age: 60
LOS: 2 days | End: 2025-06-28
Attending: EMERGENCY MEDICINE | Admitting: STUDENT IN AN ORGANIZED HEALTH CARE EDUCATION/TRAINING PROGRAM
Payer: MEDICAID

## 2025-06-26 ENCOUNTER — OFFICE VISIT (OUTPATIENT)
Dept: MEDICAL GROUP | Facility: MEDICAL CENTER | Age: 60
End: 2025-06-26
Payer: MEDICAID

## 2025-06-26 ENCOUNTER — APPOINTMENT (OUTPATIENT)
Dept: RADIOLOGY | Facility: MEDICAL CENTER | Age: 60
End: 2025-06-26
Attending: EMERGENCY MEDICINE
Payer: MEDICAID

## 2025-06-26 VITALS
WEIGHT: 253.8 LBS | HEART RATE: 34 BPM | SYSTOLIC BLOOD PRESSURE: 80 MMHG | TEMPERATURE: 96.9 F | DIASTOLIC BLOOD PRESSURE: 60 MMHG | BODY MASS INDEX: 39.75 KG/M2 | OXYGEN SATURATION: 97 %

## 2025-06-26 DIAGNOSIS — R55 SYNCOPE, UNSPECIFIED SYNCOPE TYPE: Primary | ICD-10-CM

## 2025-06-26 DIAGNOSIS — R00.1 BRADYCARDIA BY ELECTROCARDIOGRAM: ICD-10-CM

## 2025-06-26 DIAGNOSIS — R42 LIGHTHEADED: ICD-10-CM

## 2025-06-26 DIAGNOSIS — I95.9 HYPOTENSION, UNSPECIFIED HYPOTENSION TYPE: ICD-10-CM

## 2025-06-26 DIAGNOSIS — R00.1 JUNCTIONAL BRADYCARDIA: ICD-10-CM

## 2025-06-26 DIAGNOSIS — E11.39 TYPE 2 DIABETES MELLITUS WITH OTHER OPHTHALMIC COMPLICATION, WITHOUT LONG-TERM CURRENT USE OF INSULIN (HCC): Primary | ICD-10-CM

## 2025-06-26 DIAGNOSIS — R42 DIZZY: ICD-10-CM

## 2025-06-26 PROBLEM — E11.9 DIABETES (HCC): Status: ACTIVE | Noted: 2025-06-26

## 2025-06-26 PROBLEM — E66.09 CLASS 2 OBESITY DUE TO EXCESS CALORIES IN ADULT: Status: ACTIVE | Noted: 2025-06-26

## 2025-06-26 PROBLEM — E66.812 CLASS 2 OBESITY DUE TO EXCESS CALORIES IN ADULT: Status: ACTIVE | Noted: 2025-06-26

## 2025-06-26 LAB
ALBUMIN SERPL BCP-MCNC: 3.5 G/DL (ref 3.2–4.9)
ALBUMIN/GLOB SERPL: 2.1 G/DL
ALP SERPL-CCNC: 47 U/L (ref 30–99)
ALT SERPL-CCNC: 41 U/L (ref 2–50)
ANION GAP SERPL CALC-SCNC: 8 MMOL/L (ref 7–16)
APPEARANCE UR: CLEAR
AST SERPL-CCNC: 28 U/L (ref 12–45)
BASOPHILS # BLD AUTO: 0.5 % (ref 0–1.8)
BASOPHILS # BLD: 0.02 K/UL (ref 0–0.12)
BILIRUB SERPL-MCNC: 0.2 MG/DL (ref 0.1–1.5)
BILIRUB UR QL STRIP.AUTO: NEGATIVE
BUN SERPL-MCNC: 22 MG/DL (ref 8–22)
CALCIUM ALBUM COR SERPL-MCNC: 8.8 MG/DL (ref 8.5–10.5)
CALCIUM SERPL-MCNC: 8.4 MG/DL (ref 8.5–10.5)
CHLORIDE SERPL-SCNC: 111 MMOL/L (ref 96–112)
CO2 SERPL-SCNC: 21 MMOL/L (ref 20–33)
COLOR UR: YELLOW
CREAT SERPL-MCNC: 0.88 MG/DL (ref 0.5–1.4)
EKG IMPRESSION: NORMAL
EOSINOPHIL # BLD AUTO: 0.13 K/UL (ref 0–0.51)
EOSINOPHIL NFR BLD: 3 % (ref 0–6.9)
ERYTHROCYTE [DISTWIDTH] IN BLOOD BY AUTOMATED COUNT: 48.3 FL (ref 35.9–50)
GFR SERPLBLD CREATININE-BSD FMLA CKD-EPI: 99 ML/MIN/1.73 M 2
GLOBULIN SER CALC-MCNC: 1.7 G/DL (ref 1.9–3.5)
GLUCOSE BLD STRIP.AUTO-MCNC: 100 MG/DL (ref 65–99)
GLUCOSE BLD STRIP.AUTO-MCNC: 119 MG/DL (ref 65–99)
GLUCOSE SERPL-MCNC: 98 MG/DL (ref 65–99)
GLUCOSE UR STRIP.AUTO-MCNC: >=1000 MG/DL
HCT VFR BLD AUTO: 41.4 % (ref 42–52)
HGB BLD-MCNC: 13.5 G/DL (ref 14–18)
IMM GRANULOCYTES # BLD AUTO: 0.04 K/UL (ref 0–0.11)
IMM GRANULOCYTES NFR BLD AUTO: 0.9 % (ref 0–0.9)
INR PPP: 1.27 (ref 0.87–1.13)
KETONES UR STRIP.AUTO-MCNC: NEGATIVE MG/DL
LEUKOCYTE ESTERASE UR QL STRIP.AUTO: NEGATIVE
LYMPHOCYTES # BLD AUTO: 0.5 K/UL (ref 1–4.8)
LYMPHOCYTES NFR BLD: 11.5 % (ref 22–41)
MAGNESIUM SERPL-MCNC: 1.9 MG/DL (ref 1.5–2.5)
MCH RBC QN AUTO: 30.8 PG (ref 27–33)
MCHC RBC AUTO-ENTMCNC: 32.6 G/DL (ref 32.3–36.5)
MCV RBC AUTO: 94.3 FL (ref 81.4–97.8)
MICRO URNS: ABNORMAL
MONOCYTES # BLD AUTO: 0.28 K/UL (ref 0–0.85)
MONOCYTES NFR BLD AUTO: 6.4 % (ref 0–13.4)
NEUTROPHILS # BLD AUTO: 3.38 K/UL (ref 1.82–7.42)
NEUTROPHILS NFR BLD: 77.7 % (ref 44–72)
NITRITE UR QL STRIP.AUTO: NEGATIVE
NRBC # BLD AUTO: 0 K/UL
NRBC BLD-RTO: 0 /100 WBC (ref 0–0.2)
NT-PROBNP SERPL IA-MCNC: 277 PG/ML (ref 0–125)
PH UR STRIP.AUTO: 5 [PH] (ref 5–8)
PHOSPHATE SERPL-MCNC: 3.6 MG/DL (ref 2.5–4.5)
PLATELET # BLD AUTO: 143 K/UL (ref 164–446)
PMV BLD AUTO: 9.5 FL (ref 9–12.9)
POTASSIUM SERPL-SCNC: 4 MMOL/L (ref 3.6–5.5)
PROT SERPL-MCNC: 5.2 G/DL (ref 6–8.2)
PROT UR QL STRIP: NEGATIVE MG/DL
PROTHROMBIN TIME: 15.9 SEC (ref 12–14.6)
RBC # BLD AUTO: 4.39 M/UL (ref 4.7–6.1)
RBC UR QL AUTO: NEGATIVE
SODIUM SERPL-SCNC: 140 MMOL/L (ref 135–145)
SP GR UR STRIP.AUTO: 1.03
TROPONIN T SERPL-MCNC: 8 NG/L (ref 6–19)
TSH SERPL DL<=0.005 MIU/L-ACNC: 2.82 UIU/ML (ref 0.38–5.33)
UROBILINOGEN UR STRIP.AUTO-MCNC: 0.2 EU/DL
WBC # BLD AUTO: 4.4 K/UL (ref 4.8–10.8)

## 2025-06-26 PROCEDURE — 93005 ELECTROCARDIOGRAM TRACING: CPT | Mod: TC | Performed by: EMERGENCY MEDICINE

## 2025-06-26 PROCEDURE — 770020 HCHG ROOM/CARE - TELE (206)

## 2025-06-26 PROCEDURE — 83735 ASSAY OF MAGNESIUM: CPT

## 2025-06-26 PROCEDURE — 94660 CPAP INITIATION&MGMT: CPT

## 2025-06-26 PROCEDURE — 82962 GLUCOSE BLOOD TEST: CPT | Performed by: STUDENT IN AN ORGANIZED HEALTH CARE EDUCATION/TRAINING PROGRAM

## 2025-06-26 PROCEDURE — 84484 ASSAY OF TROPONIN QUANT: CPT

## 2025-06-26 PROCEDURE — 99215 OFFICE O/P EST HI 40 MIN: CPT

## 2025-06-26 PROCEDURE — 99285 EMERGENCY DEPT VISIT HI MDM: CPT

## 2025-06-26 PROCEDURE — 93005 ELECTROCARDIOGRAM TRACING: CPT | Mod: TC

## 2025-06-26 PROCEDURE — 80053 COMPREHEN METABOLIC PANEL: CPT

## 2025-06-26 PROCEDURE — 81003 URINALYSIS AUTO W/O SCOPE: CPT

## 2025-06-26 PROCEDURE — 93005 ELECTROCARDIOGRAM TRACING: CPT | Mod: TC | Performed by: INTERNAL MEDICINE

## 2025-06-26 PROCEDURE — 84100 ASSAY OF PHOSPHORUS: CPT

## 2025-06-26 PROCEDURE — 3078F DIAST BP <80 MM HG: CPT

## 2025-06-26 PROCEDURE — 93005 ELECTROCARDIOGRAM TRACING: CPT

## 2025-06-26 PROCEDURE — A9270 NON-COVERED ITEM OR SERVICE: HCPCS | Performed by: STUDENT IN AN ORGANIZED HEALTH CARE EDUCATION/TRAINING PROGRAM

## 2025-06-26 PROCEDURE — 94760 N-INVAS EAR/PLS OXIMETRY 1: CPT

## 2025-06-26 PROCEDURE — 71045 X-RAY EXAM CHEST 1 VIEW: CPT

## 2025-06-26 PROCEDURE — 700111 HCHG RX REV CODE 636 W/ 250 OVERRIDE (IP): Performed by: STUDENT IN AN ORGANIZED HEALTH CARE EDUCATION/TRAINING PROGRAM

## 2025-06-26 PROCEDURE — 36415 COLL VENOUS BLD VENIPUNCTURE: CPT

## 2025-06-26 PROCEDURE — 700102 HCHG RX REV CODE 250 W/ 637 OVERRIDE(OP): Performed by: STUDENT IN AN ORGANIZED HEALTH CARE EDUCATION/TRAINING PROGRAM

## 2025-06-26 PROCEDURE — 84443 ASSAY THYROID STIM HORMONE: CPT

## 2025-06-26 PROCEDURE — 83880 ASSAY OF NATRIURETIC PEPTIDE: CPT

## 2025-06-26 PROCEDURE — 85025 COMPLETE CBC W/AUTO DIFF WBC: CPT

## 2025-06-26 PROCEDURE — 93010 ELECTROCARDIOGRAM REPORT: CPT | Performed by: STUDENT IN AN ORGANIZED HEALTH CARE EDUCATION/TRAINING PROGRAM

## 2025-06-26 PROCEDURE — 99214 OFFICE O/P EST MOD 30 MIN: CPT

## 2025-06-26 PROCEDURE — 99223 1ST HOSP IP/OBS HIGH 75: CPT | Performed by: STUDENT IN AN ORGANIZED HEALTH CARE EDUCATION/TRAINING PROGRAM

## 2025-06-26 PROCEDURE — 3074F SYST BP LT 130 MM HG: CPT

## 2025-06-26 PROCEDURE — 99223 1ST HOSP IP/OBS HIGH 75: CPT | Performed by: INTERNAL MEDICINE

## 2025-06-26 PROCEDURE — 85610 PROTHROMBIN TIME: CPT

## 2025-06-26 RX ORDER — POLYETHYLENE GLYCOL 3350 17 G/17G
1 POWDER, FOR SOLUTION ORAL
Status: DISCONTINUED | OUTPATIENT
Start: 2025-06-26 | End: 2025-06-28 | Stop reason: HOSPADM

## 2025-06-26 RX ORDER — POTASSIUM CHLORIDE 1500 MG/1
40 TABLET, EXTENDED RELEASE ORAL ONCE
Status: COMPLETED | OUTPATIENT
Start: 2025-06-26 | End: 2025-06-26

## 2025-06-26 RX ORDER — AMOXICILLIN 250 MG
2 CAPSULE ORAL EVERY EVENING
Status: DISCONTINUED | OUTPATIENT
Start: 2025-06-26 | End: 2025-06-28 | Stop reason: HOSPADM

## 2025-06-26 RX ORDER — PROCHLORPERAZINE EDISYLATE 5 MG/ML
5-10 INJECTION INTRAMUSCULAR; INTRAVENOUS EVERY 4 HOURS PRN
Status: DISCONTINUED | OUTPATIENT
Start: 2025-06-26 | End: 2025-06-28 | Stop reason: HOSPADM

## 2025-06-26 RX ORDER — FLUTICASONE FUROATE, UMECLIDINIUM BROMIDE AND VILANTEROL TRIFENATATE 200; 62.5; 25 UG/1; UG/1; UG/1
1 POWDER RESPIRATORY (INHALATION) DAILY
COMMUNITY
Start: 2025-06-17

## 2025-06-26 RX ORDER — SODIUM CHLORIDE, SODIUM LACTATE, POTASSIUM CHLORIDE, AND CALCIUM CHLORIDE .6; .31; .03; .02 G/100ML; G/100ML; G/100ML; G/100ML
500 INJECTION, SOLUTION INTRAVENOUS
Status: DISCONTINUED | OUTPATIENT
Start: 2025-06-26 | End: 2025-06-28 | Stop reason: HOSPADM

## 2025-06-26 RX ORDER — LISINOPRIL 5 MG/1
5 TABLET ORAL DAILY
Status: DISCONTINUED | OUTPATIENT
Start: 2025-06-27 | End: 2025-06-28 | Stop reason: HOSPADM

## 2025-06-26 RX ORDER — GABAPENTIN 100 MG/1
100 CAPSULE ORAL
COMMUNITY

## 2025-06-26 RX ORDER — ONDANSETRON 4 MG/1
4 TABLET, ORALLY DISINTEGRATING ORAL EVERY 4 HOURS PRN
Status: DISCONTINUED | OUTPATIENT
Start: 2025-06-26 | End: 2025-06-28 | Stop reason: HOSPADM

## 2025-06-26 RX ORDER — PROMETHAZINE HYDROCHLORIDE 25 MG/1
12.5-25 SUPPOSITORY RECTAL EVERY 4 HOURS PRN
Status: DISCONTINUED | OUTPATIENT
Start: 2025-06-26 | End: 2025-06-28 | Stop reason: HOSPADM

## 2025-06-26 RX ORDER — ATORVASTATIN CALCIUM 10 MG/1
10 TABLET, FILM COATED ORAL NIGHTLY
Status: DISCONTINUED | OUTPATIENT
Start: 2025-06-26 | End: 2025-06-28 | Stop reason: HOSPADM

## 2025-06-26 RX ORDER — TAMSULOSIN HYDROCHLORIDE 0.4 MG/1
0.4 CAPSULE ORAL
Status: DISCONTINUED | OUTPATIENT
Start: 2025-06-26 | End: 2025-06-28 | Stop reason: HOSPADM

## 2025-06-26 RX ORDER — DEXTROSE MONOHYDRATE 25 G/50ML
25 INJECTION, SOLUTION INTRAVENOUS
Status: DISCONTINUED | OUTPATIENT
Start: 2025-06-26 | End: 2025-06-28 | Stop reason: HOSPADM

## 2025-06-26 RX ORDER — HYDRALAZINE HYDROCHLORIDE 20 MG/ML
10 INJECTION INTRAMUSCULAR; INTRAVENOUS EVERY 4 HOURS PRN
Status: DISCONTINUED | OUTPATIENT
Start: 2025-06-26 | End: 2025-06-28 | Stop reason: HOSPADM

## 2025-06-26 RX ORDER — CALCIUM GLUCONATE 20 MG/ML
2 INJECTION, SOLUTION INTRAVENOUS ONCE
Status: COMPLETED | OUTPATIENT
Start: 2025-06-26 | End: 2025-06-26

## 2025-06-26 RX ORDER — MAGNESIUM SULFATE HEPTAHYDRATE 40 MG/ML
2 INJECTION, SOLUTION INTRAVENOUS ONCE
Status: COMPLETED | OUTPATIENT
Start: 2025-06-26 | End: 2025-06-26

## 2025-06-26 RX ORDER — GABAPENTIN 100 MG/1
100 CAPSULE ORAL
Status: DISCONTINUED | OUTPATIENT
Start: 2025-06-26 | End: 2025-06-28 | Stop reason: HOSPADM

## 2025-06-26 RX ORDER — INSULIN LISPRO 100 [IU]/ML
1-6 INJECTION, SOLUTION INTRAVENOUS; SUBCUTANEOUS
Status: DISCONTINUED | OUTPATIENT
Start: 2025-06-26 | End: 2025-06-28 | Stop reason: HOSPADM

## 2025-06-26 RX ORDER — ACETAMINOPHEN 325 MG/1
650 TABLET ORAL EVERY 6 HOURS PRN
Status: DISCONTINUED | OUTPATIENT
Start: 2025-06-26 | End: 2025-06-28 | Stop reason: HOSPADM

## 2025-06-26 RX ORDER — PROMETHAZINE HYDROCHLORIDE 25 MG/1
12.5-25 TABLET ORAL EVERY 4 HOURS PRN
Status: DISCONTINUED | OUTPATIENT
Start: 2025-06-26 | End: 2025-06-28 | Stop reason: HOSPADM

## 2025-06-26 RX ORDER — IPRATROPIUM BROMIDE AND ALBUTEROL SULFATE 2.5; .5 MG/3ML; MG/3ML
3 SOLUTION RESPIRATORY (INHALATION)
Status: DISCONTINUED | OUTPATIENT
Start: 2025-06-26 | End: 2025-06-28 | Stop reason: HOSPADM

## 2025-06-26 RX ORDER — ONDANSETRON 2 MG/ML
4 INJECTION INTRAMUSCULAR; INTRAVENOUS EVERY 4 HOURS PRN
Status: DISCONTINUED | OUTPATIENT
Start: 2025-06-26 | End: 2025-06-28 | Stop reason: HOSPADM

## 2025-06-26 RX ADMIN — MAGNESIUM SULFATE HEPTAHYDRATE 2 G: 2 INJECTION, SOLUTION INTRAVENOUS at 15:39

## 2025-06-26 RX ADMIN — GABAPENTIN 100 MG: 100 CAPSULE ORAL at 20:34

## 2025-06-26 RX ADMIN — CALCIUM GLUCONATE 2 G: 20 INJECTION, SOLUTION INTRAVENOUS at 17:52

## 2025-06-26 RX ADMIN — ATORVASTATIN CALCIUM 10 MG: 10 TABLET, FILM COATED ORAL at 20:34

## 2025-06-26 RX ADMIN — TAMSULOSIN HYDROCHLORIDE 0.4 MG: 0.4 CAPSULE ORAL at 20:34

## 2025-06-26 RX ADMIN — APIXABAN 5 MG: 5 TABLET, FILM COATED ORAL at 17:26

## 2025-06-26 RX ADMIN — POTASSIUM CHLORIDE 40 MEQ: 1500 TABLET, EXTENDED RELEASE ORAL at 20:34

## 2025-06-26 ASSESSMENT — ENCOUNTER SYMPTOMS
PALPITATIONS: 0
BRUISES/BLEEDS EASILY: 0
ABDOMINAL PAIN: 0
DOUBLE VISION: 0
MYALGIAS: 0
NAUSEA: 0
DIZZINESS: 1
SPEECH CHANGE: 0
LOSS OF CONSCIOUSNESS: 1
FEVER: 0
HEADACHES: 0
VOMITING: 0
CHILLS: 0
HEARTBURN: 0
BLURRED VISION: 0
WEAKNESS: 1
FOCAL WEAKNESS: 0
DEPRESSION: 0
SHORTNESS OF BREATH: 0
COUGH: 0

## 2025-06-26 ASSESSMENT — SOCIAL DETERMINANTS OF HEALTH (SDOH)
WITHIN THE PAST 12 MONTHS, THE FOOD YOU BOUGHT JUST DIDN'T LAST AND YOU DIDN'T HAVE MONEY TO GET MORE: NEVER TRUE
WITHIN THE PAST 12 MONTHS, YOU WORRIED THAT YOUR FOOD WOULD RUN OUT BEFORE YOU GOT THE MONEY TO BUY MORE: NEVER TRUE
IN THE PAST 12 MONTHS, HAS THE ELECTRIC, GAS, OIL, OR WATER COMPANY THREATENED TO SHUT OFF SERVICE IN YOUR HOME?: NO
IN THE PAST 12 MONTHS, HAS THE ELECTRIC, GAS, OIL, OR WATER COMPANY THREATENED TO SHUT OFF SERVICE IN YOUR HOME?: NO
WITHIN THE LAST YEAR, HAVE YOU BEEN HUMILIATED OR EMOTIONALLY ABUSED IN OTHER WAYS BY YOUR PARTNER OR EX-PARTNER?: NO
WITHIN THE LAST YEAR, HAVE YOU BEEN AFRAID OF YOUR PARTNER OR EX-PARTNER?: NO
WITHIN THE LAST YEAR, HAVE YOU BEEN KICKED, HIT, SLAPPED, OR OTHERWISE PHYSICALLY HURT BY YOUR PARTNER OR EX-PARTNER?: NO
WITHIN THE PAST 12 MONTHS, THE FOOD YOU BOUGHT JUST DIDN'T LAST AND YOU DIDN'T HAVE MONEY TO GET MORE: NEVER TRUE
WITHIN THE PAST 12 MONTHS, YOU WORRIED THAT YOUR FOOD WOULD RUN OUT BEFORE YOU GOT THE MONEY TO BUY MORE: NEVER TRUE
WITHIN THE LAST YEAR, HAVE TO BEEN RAPED OR FORCED TO HAVE ANY KIND OF SEXUAL ACTIVITY BY YOUR PARTNER OR EX-PARTNER?: NO

## 2025-06-26 ASSESSMENT — FIBROSIS 4 INDEX
FIB4 SCORE: 1.8
FIB4 SCORE: 1.2
FIB4 SCORE: 1.8
FIB4 SCORE: 1.2

## 2025-06-26 ASSESSMENT — PAIN DESCRIPTION - PAIN TYPE: TYPE: ACUTE PAIN

## 2025-06-26 ASSESSMENT — CHA2DS2 SCORE
AGE 75 OR GREATER: NO
SEX: MALE
HYPERTENSION: YES
CHF OR LEFT VENTRICULAR DYSFUNCTION: NO
CHA2DS2 VASC SCORE: 3
VASCULAR DISEASE: YES
DIABETES: YES
AGE 65 TO 74: NO
PRIOR STROKE OR TIA OR THROMBOEMBOLISM: NO

## 2025-06-26 ASSESSMENT — LIFESTYLE VARIABLES
CONSUMPTION TOTAL: NEGATIVE
HOW MANY TIMES IN THE PAST YEAR HAVE YOU HAD 5 OR MORE DRINKS IN A DAY: 0
DOES PATIENT WANT TO STOP DRINKING: NO
SUBSTANCE_ABUSE: 0
TOTAL SCORE: 0
TOTAL SCORE: 0
AVERAGE NUMBER OF DAYS PER WEEK YOU HAVE A DRINK CONTAINING ALCOHOL: 1
EVER HAD A DRINK FIRST THING IN THE MORNING TO STEADY YOUR NERVES TO GET RID OF A HANGOVER: NO
EVER FELT BAD OR GUILTY ABOUT YOUR DRINKING: NO
TOTAL SCORE: 0
ON A TYPICAL DAY WHEN YOU DRINK ALCOHOL HOW MANY DRINKS DO YOU HAVE: 1
HAVE YOU EVER FELT YOU SHOULD CUT DOWN ON YOUR DRINKING: NO
HAVE PEOPLE ANNOYED YOU BY CRITICIZING YOUR DRINKING: NO
ALCOHOL_USE: YES

## 2025-06-26 ASSESSMENT — COGNITIVE AND FUNCTIONAL STATUS - GENERAL
DAILY ACTIVITIY SCORE: 24
SUGGESTED CMS G CODE MODIFIER DAILY ACTIVITY: CH
SUGGESTED CMS G CODE MODIFIER MOBILITY: CH
MOBILITY SCORE: 24

## 2025-06-26 ASSESSMENT — PATIENT HEALTH QUESTIONNAIRE - PHQ9
2. FEELING DOWN, DEPRESSED, IRRITABLE, OR HOPELESS: NOT AT ALL
1. LITTLE INTEREST OR PLEASURE IN DOING THINGS: NOT AT ALL
SUM OF ALL RESPONSES TO PHQ9 QUESTIONS 1 AND 2: 0

## 2025-06-26 NOTE — CARE PLAN
Problem: Knowledge Deficit - Standard  Goal: Patient and family/care givers will demonstrate understanding of plan of care, disease process/condition, diagnostic tests and medications  Outcome: Progressing     Problem: Pain - Standard  Goal: Alleviation of pain or a reduction in pain to the patient’s comfort goal  Outcome: Progressing   The patient is Stable - Low risk of patient condition declining or worsening    Shift Goals  Clinical Goals: Remain free of falls, no skin breakdown  Patient Goals: Rest  Family Goals: KELLY    Progress made toward(s) clinical / shift goals:      Pt educated on plan of care, pt verbalizes understanding, reinforcement needed. Pt educated on pain/anxiety scales, alleviating factors, pain/anxiety medications, and side effects, and need for pain/anxiety reassessment, pt pain/anxiety controlled at this time, reinforcement needed. Pt educated on the need to reposition frequently and remain dry to avoid skin breakdown, reinforcement needed, no new skin breakdown during shift. Fall risk education provided to pt, pt educated about the need to call for assistance while attempting to ambulate, reinforcement needed, pt remains free of falls this shift

## 2025-06-26 NOTE — ED NOTES
Med rec completed per patient at bedside.    Allergies reviewed.    Outpatient antimicrobials within the last 30 days: NONE.    ANTICOAGULANTS: Patient is on ELIQUIS. Last dose 6/26/2025 at around 09:30.    Dispense history via [facility] is available in [facility].    Preferred pharmacy: [facility] on [address].

## 2025-06-26 NOTE — CONSULTS
Cardiology Initial Consult Note    Reason for Consult:  Asked by Dr Burt Tony M.D. to see this patient with symptomatic bradycardia, syncope  Patient's PCP: Natalya Ortega M.D.    CC:   Chief Complaint   Patient presents with    Syncope     Pt had a witnessed syncopal event at an MD appt two weeks ago. Pt has been feeling intermittently dizzy and weak. - chest pain, + dyspnea with exertion       HPI: This is a 60 year old man with PMH significant for PAF on eliquis currently on amiodarone and bisoprolol, HTN, DM2 who was sent to the ER by his PCP due to symptomatic bradycardia.    Patient was at PCP office today. Noted to be bradycardic with HR in the 30s with some lightheadedness. He was sent to the ER for evaluation. He was given Atropine with mild improvement in HR. Still 30-40s in the ER. TSH was checked and within normal limits.    According to the patient, he was prescribed amiodarone 200mg BID by his cardiologist on 4/30/2025. Has been taking it twice a day since initial RX. Has also been taking Bisoprolol  5 mg daily. Last bisoprolol dose was yesterday evening. He also took amiodarone 200mg this morning.    He states that about two weeks ago, while at his audiology appointment for hearing aid evaluation, he sustained a syncopal event. Stood up from seated position and had lightheadedness before sustaining transient loss of consciousness.     Medications / Drug list prior to admission:  Medications Ordered Prior to Encounter[1]    Current list of administered Medications:  Current Medications[2]    Past Medical History[3]    Past Surgical History[4]    Family History   Problem Relation Age of Onset    Hypertension Mother     Cancer Maternal Grandmother      Patient family history was personally reviewed, no pertinent family history to current presentation    Social History[5]    ALLERGIES:  Allergies[6]    Review of systems:  A complete review of symptoms was reviewed with the patient. This is  "reviewed in H&P and PMH. ALL OTHERS reviewed and negative    Physical exam:  Patient Vitals for the past 24 hrs:   BP Temp Temp src Pulse Resp SpO2 Height Weight   06/26/25 1421 131/69 36.4 °C (97.5 °F) Temporal (!) 40 18 99 % 1.702 m (5' 7.01\") 115 kg (253 lb)   06/26/25 1339 121/58 -- -- (!) 37 -- 96 % -- --   06/26/25 1300 105/55 -- -- (!) 38 17 96 % -- --   06/26/25 1215 114/65 -- -- (!) 42 16 97 % -- --   06/26/25 1201 113/62 36.3 °C (97.3 °F) Temporal (!) 40 18 99 % -- --   06/26/25 1159 -- -- -- -- -- -- 1.702 m (5' 7\") 115 kg (253 lb)     General: Not in acute distress  EYES: No jaundice  HEENT: OP clear   Neck:  No carotid bruits, No JVD appreciated  CVS:  Bradycardic, regular rhythm, Normal S1, S2. No murmurs  Resp: Normal respiratory effort, lungs CTA bilaterally. No rales or rhonchi  Abdomen: Soft, non-distended  Skin: No obvious rashes, no cyanosis  Neurological: Alert and oriented x3, moves all extremities, no focal neurologic deficits  Psychiatric: Appropriate affect  Extremities:   Extremities warm, 2+ bilateral radial pulses.  2+ bilateral dp pulses, trace edema      Data:  Laboratory studies personally reviewed by me:  Recent Results (from the past 24 hours)   CBC w/ Differential    Collection Time: 06/26/25 12:00 PM   Result Value Ref Range    WBC 4.4 (L) 4.8 - 10.8 K/uL    RBC 4.39 (L) 4.70 - 6.10 M/uL    Hemoglobin 13.5 (L) 14.0 - 18.0 g/dL    Hematocrit 41.4 (L) 42.0 - 52.0 %    MCV 94.3 81.4 - 97.8 fL    MCH 30.8 27.0 - 33.0 pg    MCHC 32.6 32.3 - 36.5 g/dL    RDW 48.3 35.9 - 50.0 fL    Platelet Count 143 (L) 164 - 446 K/uL    MPV 9.5 9.0 - 12.9 fL    Neutrophils-Polys 77.70 (H) 44.00 - 72.00 %    Lymphocytes 11.50 (L) 22.00 - 41.00 %    Monocytes 6.40 0.00 - 13.40 %    Eosinophils 3.00 0.00 - 6.90 %    Basophils 0.50 0.00 - 1.80 %    Immature Granulocytes 0.90 0.00 - 0.90 %    Nucleated RBC 0.00 0.00 - 0.20 /100 WBC    Neutrophils (Absolute) 3.38 1.82 - 7.42 K/uL    Lymphs (Absolute) 0.50 (L) " 1.00 - 4.80 K/uL    Monos (Absolute) 0.28 0.00 - 0.85 K/uL    Eos (Absolute) 0.13 0.00 - 0.51 K/uL    Baso (Absolute) 0.02 0.00 - 0.12 K/uL    Immature Granulocytes (abs) 0.04 0.00 - 0.11 K/uL    NRBC (Absolute) 0.00 K/uL   Complete Metabolic Panel (CMP)    Collection Time: 06/26/25 12:00 PM   Result Value Ref Range    Sodium 140 135 - 145 mmol/L    Potassium 4.0 3.6 - 5.5 mmol/L    Chloride 111 96 - 112 mmol/L    Co2 21 20 - 33 mmol/L    Anion Gap 8.0 7.0 - 16.0    Glucose 98 65 - 99 mg/dL    Bun 22 8 - 22 mg/dL    Creatinine 0.88 0.50 - 1.40 mg/dL    Calcium 8.4 (L) 8.5 - 10.5 mg/dL    Correct Calcium 8.8 8.5 - 10.5 mg/dL    AST(SGOT) 28 12 - 45 U/L    ALT(SGPT) 41 2 - 50 U/L    Alkaline Phosphatase 47 30 - 99 U/L    Total Bilirubin 0.2 0.1 - 1.5 mg/dL    Albumin 3.5 3.2 - 4.9 g/dL    Total Protein 5.2 (L) 6.0 - 8.2 g/dL    Globulin 1.7 (L) 1.9 - 3.5 g/dL    A-G Ratio 2.1 g/dL   Troponin - STAT Once    Collection Time: 06/26/25 12:00 PM   Result Value Ref Range    Troponin T 8 6 - 19 ng/L   proBrain Natriuretic Peptide, NT    Collection Time: 06/26/25 12:00 PM   Result Value Ref Range    NT-proBNP 277 (H) 0 - 125 pg/mL   Magnesium    Collection Time: 06/26/25 12:00 PM   Result Value Ref Range    Magnesium 1.9 1.5 - 2.5 mg/dL   Phosphorus    Collection Time: 06/26/25 12:00 PM   Result Value Ref Range    Phosphorus 3.6 2.5 - 4.5 mg/dL   TSH WITH REFLEX TO FT4    Collection Time: 06/26/25 12:00 PM   Result Value Ref Range    TSH 2.820 0.380 - 5.330 uIU/mL   ESTIMATED GFR    Collection Time: 06/26/25 12:00 PM   Result Value Ref Range    GFR (CKD-EPI) 99 >60 mL/min/1.73 m 2   Prothrombin time (INR)    Collection Time: 06/26/25 12:00 PM   Result Value Ref Range    PT 15.9 (H) 12.0 - 14.6 sec    INR 1.27 (H) 0.87 - 1.13   EKG - Clinic Performed    Collection Time: 06/26/25 12:10 PM   Result Value Ref Range    Report       [facility] Emergency Dept.    Test Date:  2025-06-26  Pt Name:    ALLI KENNEDY                  Department: [facility]  MRN:        [id number]                      Room:  Gender:     Male                         Technician: [id number]  :        1965                   Requested By:FRANSISCA BASS  Order #:    [id number]                    Reading MD: KASHMIR RUIZ MD    Measurements  Intervals                                Axis  Rate:       43                           P:          0  WV:         0                            QRS:        100  QRSD:       123                          T:          30  QT:         507  QTc:        429    Interpretive Statements  Junctional rhythm  Nonspecific intraventricular conduction delay  Compared to ECG 2025 18:02:36  Junctional rhythm now present  Intraventricular conduction delay now present  Atrial fibrillation no longer present  Right-axis deviation no longer present  Electronically Signed On 2025 12:10:46 PDT by BAKARI RUIZ MD         Imaging:  DX-CHEST-PORTABLE (1 VIEW)   Final Result      No acute cardiac or pulmonary abnormalities are identified.            EKG tracings personally reviewed by me junctional rhythm    Echo 2024:  Normal left ventricular systolic function.  LVEF 55%  The right ventricle is dilated. Normal RV systolic function       All pertinent features of laboratory and imaging reviewed including primary images where applicable      Principal Problem:    Symptomatic bradycardia (POA: Yes)  Resolved Problems:    * No resolved hospital problems. *      Assessment / Plan:  Symptomatic bradycardia  Junctional rhythm  PAF currently on amiodarone and bisoprolol  Hypercoagulable state due to PAF  Hypertension  Diabetes mellitus    Recommendations  Bradycardia likely due to concomitant use of beta blocker and amiodarone. Has been taking amiodarone 200mg BID over the past 2 months.  Currently asymptomatic despite HR in the 30-40 bpm.  Hold all AV lexi blockers. Keep on telemetry  Hold bisoprolol and allow for beta blocker  washout. Hold amiodarone.  If there is no significant improvement in HR over the next 24-48 hours, may require PPM implant.  Continue Eliquis for OAC and long term thromboembolism ppx.  Cardiology will continue to follow.    A total of 62 minutes of time was spent on day of encounter reviewing medical record, performing history and examination, counseling, ordering medication/test/consults and documentation.    I personally discussed his case with  Dr Burt Tony M.D.    Future Appointments   Date Time Provider Department Center   7/1/2025  4:20 PM Mark Whitley D.O. [facility] None   9/8/2025  3:00 PM Stoney Cochran M.D. [facility] None   10/23/2025  1:20 PM Natalya Ortega M.D. [facility]   11/21/2025  3:15 PM Tyler Brower M.D. [facility] None       It is my pleasure to participate in the care of Mr. Stokes.  Please do not hesitate to contact me with questions or concerns.    Antonio Giraldo MD  Cardiologist, [facility]    6/26/2025    Please note that this dictation was created using voice recognition software. I have made every reasonable attempt to correct obvious errors, but it is possible there are errors of grammar and possibly content that I did not discover before finalizing the note.         [1]   No current facility-administered medications on file prior to encounter.     Current Outpatient Medications on File Prior to Encounter   Medication Sig Dispense Refill    Calcium Carb-Cholecalciferol (CALCIUM + D3 PO) Take 1 Tablet by mouth every morning. (OTC)      Cholecalciferol (VITAMIN D3 PO) Take 2 Tablets by mouth every morning. (OTC)      TRELEGY ELLIPTA 200-62.5-25 MCG/ACT inhaler Inhale 1 Puff every day.      gabapentin (NEURONTIN) 100 MG Cap Take 100 mg by mouth at bedtime.      lisinopril (PRINIVIL) 5 MG Tab Take 1 tablet by mouth once daily (Patient taking differently: Take 5 mg by mouth every day. Take 1 tablet by mouth once daily) 90 Tablet 3     diclofenac DR (VOLTAREN) 75 MG Tablet Delayed Response Take 1 Tablet by mouth 2 times a day. 60 Tablet 1    ELIQUIS 5 MG Tab Take 1 tablet by mouth twice daily 180 Tablet 3    amiodarone (CORDARONE) 200 MG Tab Take 1 Tablet by mouth 2 times a day. 60 Tablet 6    bisoprolol (ZEBETA) 5 MG Tab Take 1 Tablet by mouth every day. 90 Tablet 3    Empagliflozin (JARDIANCE) 10 MG Tab tablet Take 1 Tablet by mouth every day. 90 Tablet 3    atorvastatin (LIPITOR) 10 MG Tab Take 1 Tablet by mouth every evening. 90 Tablet 3    tamsulosin (FLOMAX) 0.4 MG capsule Take 0.4 mg by mouth at bedtime.     [2]   Current Facility-Administered Medications:     magnesium sulfate IVPB premix 2 g, 2 g, Intravenous, Once, Burt Tony M.D.    calcium GLUConate 2 g in NaCl IVPB premix, 2 g, Intravenous, Once, Burt Tony M.D.    LR (Bolus) infusion 500 mL, 500 mL, Intravenous, Once PRN, Burt Tony M.D.    acetaminophen (Tylenol) tablet 650 mg, 650 mg, Oral, Q6HRS PRN, Burt Tony M.D.    hydrALAZINE (Apresoline) injection 10 mg, 10 mg, Intravenous, Q4HRS PRN, Burt Tony M.D.    ondansetron (Zofran) syringe/vial injection 4 mg, 4 mg, Intravenous, Q4HRS PRN, Burt Tony M.D.    ondansetron (Zofran ODT) dispertab 4 mg, 4 mg, Oral, Q4HRS PRN, Burt Tony M.D.    promethazine (Phenergan) tablet 12.5-25 mg, 12.5-25 mg, Oral, Q4HRS PRN, Burt Tony M.D.    promethazine (Phenergan) suppository 12.5-25 mg, 12.5-25 mg, Rectal, Q4HRS PRN, Burt Tony M.D.    prochlorperazine (Compazine) injection 5-10 mg, 5-10 mg, Intravenous, Q4HRS PRN, Burt Tony M.D.    senna-docusate (Pericolace Or Senokot S) 8.6-50 MG per tablet 2 Tablet, 2 Tablet, Oral, Q EVENING **AND** polyethylene glycol/lytes (Miralax) Packet 1 Packet, 1 Packet, Oral, QDAY PRN, Burt Tony M.D.    atorvastatin (Lipitor) tablet 10 mg, 10 mg, Oral, Nightly, Burt Tony M.D.    gabapentin (Neurontin) capsule 100 mg, 100 mg, Oral, QHS, Burt Tony M.D.    [Held by  provider] apixaban (Eliquis) tablet 5 mg, 5 mg, Oral, BID, Burt Tony M.D.    [Held by provider] tamsulosin (Flomax) capsule 0.4 mg, 0.4 mg, Oral, QHS, Burt Tony M.D.    [START ON 6/27/2025] fluticasone-umeclidinium-vilanterol (Trelegy Ellipta) 200-62.5-25 mcg/PUFF inhaler 1 Puff, 1 Puff, Inhalation, DAILY, Burt Tony M.D.    [Held by provider] lisinopril (Prinivil) tablet 5 mg, 5 mg, Oral, DAILY, Burt Tony M.D.    ipratropium-albuterol (DUONEB) nebulizer solution, 3 mL, Nebulization, Q4H PRN (RT), Burt Tony M.D.  [3]   Past Medical History:  Diagnosis Date    Arrhythmia     A-fib    Cancer (HCC) 01/2024    prostate cancer    Dental disorder     upper/lower dentures    Dyslipidemia     GERD (gastroesophageal reflux disease)     Heart burn     High cholesterol     Hypertension     Indigestion     Sleep apnea     Snoring    [4]   Past Surgical History:  Procedure Laterality Date    RADIO FREQUENCY ABLATION ADDITIONAL LEVEL Bilateral 5/20/2025    Procedure: RIGHT and LEFT radiofrequency neurotomies medial branch targeting the L3-4, L4-5, and L5-S1 facet joints with fluoroscopic guidance and sedation…..Note: plan for sedation with 0.5mg versed and 25 mcg fentanyl. Plan for 80 °C for 90 seconds for each neurotomy;  Surgeon: Satya Ponce M.D.;  [facility];  Service: Pain Management    LUMBAR MEDIAL BRANCH BLOCKS Bilateral 4/29/2025    Procedure: Diagnostic medial branch blocks targeting the BILATERAL L3-4, L4-5, and L5-S1 facet joints with fluoroscopic guidance #2…Note: Diagnostic medial branch block #2 is only be done if procedure #1 is a positive block.  This will be on a separate date from procedure #1.;  Surgeon: Satya Ponce M.D.;  Location: [facility];  Service: Pain Management    LUMBAR MEDIAL BRANCH BLOCKS Bilateral 4/16/2025    Procedure: Diagnostic medial branch blocks targeting the BILATERAL L3-4, L4-5, and L5-S1 facet joints with  fluoroscopic guidance;  Surgeon: Satya Ponce M.D.;  Location: [facility];  Service: Pain Management    EMG/NCS  2/3/2025   [5]   Social History  Tobacco Use    Smoking status: Former     Current packs/day: 0.00     Average packs/day: 1 pack/day for 39.0 years (39.0 ttl pk-yrs)     Types: Cigarettes     Start date: 1979     Quit date: 2018     Years since quittin.4    Smokeless tobacco: Never    Tobacco comments:     1ppd   Vaping Use    Vaping status: Never Used   Substance Use Topics    Alcohol use: Not Currently     Comment: once in awhile, 1 beer    Drug use: No   [6]   Allergies  Allergen Reactions    Metformin Diarrhea

## 2025-06-26 NOTE — ASSESSMENT & PLAN NOTE
Possibly secondary to concomitant intake of amiodarone and bisoprolol  --Hold AV lexi blocking agent  -s/p atropine 0.5 mg by EMS    Calcium gluconate 2g for BB reversal  Target K>4.0 and Mg>2.0  Telemetry monitoring  Check TTE  Cardiology consulted, follow-up appreciated  --Allow BB and amiodarone to washout  --No immediate plan for PPM at this time

## 2025-06-26 NOTE — H&P
Hospital Medicine History & Physical Note    Date of Service  6/26/2025    Primary Care Physician  Natalya Ortega M.D.    Consultants  Cardiology ()    Code Status  Full Code    Chief Complaint  Chief Complaint   Patient presents with    Syncope     Pt had a witnessed syncopal event at an MD appt two weeks ago. Pt has been feeling intermittently dizzy and weak. - chest pain, + dyspnea with exertion       History of Presenting Illness  Ta Chris Stokes is a 60 y.o. male with history of chronic A-fib on amiodarone/bisoprolol/Eliquis, hypertension, hyperlipidemia, diabetes, prostate cancer who presented 6/26/2025 with evaluation for bradycardia, syncopal episode.  Patient has been taking amiodarone and bisoprolol for atrial fibrillation.  Earlier today, he was seen at PCP office, noted to have been bradycardic and had a syncopal episode.  Patient received atropine 0.5 mg by EMS.  In ER, HR in 30s to 40s, atrial fibrillation.  Cardiology was consulted, formal evaluation to follow.  Admitted to medicine service for further evaluation and treatment.    I discussed the plan of care with patient, bedside RN, charge RN, pharmacy, and cardiology.    Review of Systems  Review of Systems   Constitutional:  Negative for chills and fever.   HENT:  Negative for hearing loss and tinnitus.    Eyes:  Negative for blurred vision and double vision.   Respiratory:  Negative for cough and shortness of breath.    Cardiovascular:  Negative for chest pain and palpitations.   Gastrointestinal:  Negative for abdominal pain, heartburn, nausea and vomiting.   Genitourinary:  Negative for dysuria and urgency.   Musculoskeletal:  Negative for joint pain and myalgias.   Skin:  Negative for itching and rash.   Neurological:  Positive for dizziness, loss of consciousness and weakness. Negative for speech change, focal weakness and headaches.   Endo/Heme/Allergies:  Negative for environmental allergies. Does not bruise/bleed easily.    Psychiatric/Behavioral:  Negative for depression and substance abuse.    All other systems reviewed and are negative.      Past Medical History   has a past medical history of Arrhythmia, Cancer (HCC) (01/2024), Dental disorder, Dyslipidemia, GERD (gastroesophageal reflux disease), Heart burn, High cholesterol, Hypertension, Indigestion, Sleep apnea, and Snoring.    Surgical History   has a past surgical history that includes EMG/NCS (2/3/2025); lumbar medial branch blocks (Bilateral, 4/16/2025); lumbar medial branch blocks (Bilateral, 4/29/2025); and radio frequency ablation additional level (Bilateral, 5/20/2025).     Family History  Family History   Problem Relation Age of Onset    Hypertension Mother     Cancer Maternal Grandmother         Family history reviewed with patient. There is family history that is pertinent to the chief complaint.     Social History   reports that he quit smoking about 7 years ago. His smoking use included cigarettes. He started smoking about 46 years ago. He has a 39 pack-year smoking history. He has never used smokeless tobacco. He reports that he does not currently use alcohol. He reports that he does not use drugs.    Allergies  Allergies[1]    Medications  Prior to Admission Medications   Prescriptions Last Dose Informant Patient Reported? Taking?   Budeson-Glycopyrrol-Formoterol (BREZTRI AEROSPHERE) 160-9-4.8 MCG/ACT Aerosol   Yes No   Sig: Inhale.   ELIQUIS 5 MG Tab   No No   Sig: Take 1 tablet by mouth twice daily   Empagliflozin (JARDIANCE) 10 MG Tab tablet   No No   Sig: Take 1 Tablet by mouth every day.   amiodarone (CORDARONE) 200 MG Tab   No No   Sig: Take 1 Tablet by mouth 2 times a day.   atorvastatin (LIPITOR) 10 MG Tab   No No   Sig: Take 1 Tablet by mouth every evening.   bisoprolol (ZEBETA) 5 MG Tab   No No   Sig: Take 1 Tablet by mouth every day.   diclofenac DR (VOLTAREN) 75 MG Tablet Delayed Response   No No   Sig: Take 1 Tablet by mouth 2 times a day.    lisinopril (PRINIVIL) 5 MG Tab   No No   Sig: Take 1 tablet by mouth once daily   tamsulosin (FLOMAX) 0.4 MG capsule   Yes No   Sig: Take 1 Capsule by mouth at bedtime.      Facility-Administered Medications: None       Physical Exam  Temp:  [36.1 °C (96.9 °F)-36.4 °C (97.5 °F)] 36.4 °C (97.5 °F)  Pulse:  [34-42] 40  Resp:  [16-18] 18  BP: ()/(55-69) 131/69  SpO2:  [96 %-99 %] 99 %  Blood Pressure: 121/58   Temperature: 36.3 °C (97.3 °F)   Pulse: (!) 37   Respiration: 17   Pulse Oximetry: 96 %       Physical Exam  Vitals and nursing note reviewed.   Constitutional:       General: He is not in acute distress.  HENT:      Head: Normocephalic and atraumatic.      Nose: Nose normal.      Mouth/Throat:      Mouth: Mucous membranes are moist.      Pharynx: Oropharynx is clear.   Eyes:      General: No scleral icterus.     Extraocular Movements: Extraocular movements intact.   Cardiovascular:      Rate and Rhythm: Bradycardia present. Rhythm irregular.      Pulses: Normal pulses.      Heart sounds:      No friction rub.   Pulmonary:      Effort: No respiratory distress.      Breath sounds: No wheezing or rales.   Chest:      Chest wall: No tenderness.   Abdominal:      General: There is distension.      Tenderness: There is no abdominal tenderness. There is no guarding or rebound.   Musculoskeletal:         General: Normal range of motion.      Cervical back: Neck supple. No tenderness.      Right lower leg: No edema.      Left lower leg: No edema.   Skin:     General: Skin is warm and dry.   Neurological:      General: No focal deficit present.      Mental Status: He is alert and oriented to person, place, and time.   Psychiatric:         Mood and Affect: Mood normal.         Laboratory:  Recent Labs     06/26/25  1200   WBC 4.4*   RBC 4.39*   HEMOGLOBIN 13.5*   HEMATOCRIT 41.4*   MCV 94.3   MCH 30.8   MCHC 32.6   RDW 48.3   PLATELETCT 143*   MPV 9.5     Recent Labs     06/26/25  1200   SODIUM 140   POTASSIUM 4.0    CHLORIDE 111   CO2 21   GLUCOSE 98   BUN 22   CREATININE 0.88   CALCIUM 8.4*     Recent Labs     06/26/25  1200   ALTSGPT 41   ASTSGOT 28   ALKPHOSPHAT 47   TBILIRUBIN 0.2   GLUCOSE 98     Recent Labs     06/26/25  1200   INR 1.27*     Recent Labs     06/26/25  1200   NTPROBNP 277*         Recent Labs     06/26/25  1200   TROPONINT 8       Imaging:  DX-CHEST-PORTABLE (1 VIEW)   Final Result      No acute cardiac or pulmonary abnormalities are identified.      EC-ECHOCARDIOGRAM COMPLETE W/O CONT    (Results Pending)       X-Ray:  I have personally reviewed the images and compared with prior images.  EKG:  I have personally reviewed the images and compared with prior images.    Assessment/Plan:  Justification for Admission Status  I anticipate this patient  will require at least 2 midnights hospitalization, therefore appropriate for inpatient status.      * Symptomatic bradycardia- (present on admission)  Assessment & Plan  Possibly secondary to concomitant intake of amiodarone and bisoprolol  --Hold AV lexi blocking agent  -s/p atropine 0.5 mg by EMS    Calcium gluconate 2g for BB reversal  Target K>4.0 and Mg>2.0  Telemetry monitoring  Check TTE  Cardiology consulted, follow-up appreciated  --Allow BB and amiodarone to washout  --No immediate plan for PPM at this time    Diabetes (HCC)  Assessment & Plan  ISS    Syncope and collapse  Assessment & Plan  Likely secondary to symptomatic bradycardia  Telemetry monitoring  Check TTE, orthostatic vital    Dyslipidemia- (present on admission)  Assessment & Plan  Statin    Essential hypertension- (present on admission)  Assessment & Plan  Hold home meds for now given concern for symptomatic bradycardia --to avoid hypotension  Resume appropriate    Class 2 obesity due to excess calories in adult  Assessment & Plan  Diet and lifestyle modification  Body mass index is 39.62 kg/m².    Carcinoma of prostate (HCC)- (present on admission)  Assessment & Plan  For history  of  Continue Flomax        VTE prophylaxis: therapeutic anticoagulation with Eliquis         [1]   Allergies  Allergen Reactions    Metformin Diarrhea

## 2025-06-26 NOTE — PROGRESS NOTES
Verbal consent was acquired by the patient to use [employer] ambient listening note generation during this visit     Subjective:     CC:  The encounter diagnosis was Type 2 diabetes mellitus with other ophthalmic complication, without long-term current use of insulin (formerly [facility]).    HISTORY OF THE PRESENT ILLNESS: Patient is a 60 y.o. male.     History of Present Illness  The patient presents for evaluation of lightheadedness and syncope.    Syncope  - Approximately 2 weeks ago, he experienced an episode of syncope at a doctor's office, followed by a period of amnesia.  Reports waking up sitting in a chair and then being monitored for a couple of hours and going home.  - During a subsequent visit to his audiologist 4 days ago, he was able to recall the incident.  - The episode was characterized by severe dizziness upon standing, accompanied by nausea.  - He reported these symptoms to his audiologist, who advised him to sit down if he felt dizzy.  - He felt well enough to remain standing.  - Reports some lightheadedness and dizziness today when standing up.    Lightheadedness  - He reports no lightheadedness while driving.  - Notes that his symptoms are predominantly postural, occurring when he stands after sitting for a prolonged period.  - He is not experiencing any lightheadedness or dizziness at present.    Palpitations  - He reports no chest pain but mentions occasional palpitations.    Supplemental information: He is currently on supplemental oxygen at a rate of 2 liters per minute, which he uses intermittently throughout the day.    Health Maintenance: reviewed and completed per patient preference.     ROS:   PER HPI.           Social History     Socioeconomic History    Marital status: Single     Spouse name: Not on file    Number of children: Not on file    Years of education: Not on file    Highest education level: Associate degree: occupational, technical, or vocational program   Occupational History    Not on  file   Tobacco Use    Smoking status: Former     Current packs/day: 0.00     Average packs/day: 1 pack/day for 39.0 years (39.0 ttl pk-yrs)     Types: Cigarettes     Start date: 1979     Quit date: 2018     Years since quittin.4    Smokeless tobacco: Never    Tobacco comments:     1ppd   Vaping Use    Vaping status: Never Used   Substance and Sexual Activity    Alcohol use: Not Currently     Comment: once in awhile, 1 beer    Drug use: No    Sexual activity: Not Currently     Partners: Female   Other Topics Concern    Not on file   Social History Narrative    Not on file     Social Drivers of Health     Financial Resource Strain: High Risk (3/22/2023)    Overall Financial Resource Strain (CARDIA)     Difficulty of Paying Living Expenses: Hard   Food Insecurity: Food Insecurity Present (3/22/2023)    Hunger Vital Sign     Worried About Running Out of Food in the Last Year: Sometimes true     Ran Out of Food in the Last Year: Never true   Transportation Needs: No Transportation Needs (3/22/2023)    PRAPARE - Transportation     Lack of Transportation (Medical): No     Lack of Transportation (Non-Medical): No   Physical Activity: Sufficiently Active (3/22/2023)    Exercise Vital Sign     Days of Exercise per Week: 7 days     Minutes of Exercise per Session: 150+ min   Stress: Stress Concern Present (3/22/2023)    [employer] - Occupational Stress Questionnaire     Feeling of Stress : To some extent   Social Connections: Moderately Isolated (3/22/2023)    Social Connection and Isolation Panel [NHANES]     Frequency of Communication with Friends and Family: More than three times a week     Frequency of Social Gatherings with Friends and Family: More than three times a week     Attends Latter-day Services: Never     Active Member of Clubs or Organizations: No     Attends Club or Organization Meetings: Never     Marital Status: Living with partner   Intimate Partner Violence: Not on  file   Housing Stability: Low Risk  (3/22/2023)    Housing Stability Vital Sign     Unable to Pay for Housing in the Last Year: No     Number of Places Lived in the Last Year: 1     Unstable Housing in the Last Year: No      Allergies[1]         Current Outpatient Medications:     lisinopril, Take 1 tablet by mouth once daily    diclofenac DR, 75 mg, Oral, BID    Eliquis, 5 mg, Oral, BID    amiodarone, 200 mg, Oral, BID    Budeson-Glycopyrrol-Formoterol, Inhale.    bisoprolol, 5 mg, Oral, DAILY    Jardiance, 10 mg, Oral, DAILY    atorvastatin, 10 mg, Oral, Nightly    tamsulosin, 1 Capsule, Oral, QHS   Objective:     Exam: BP (!) 80/60 (BP Location: Right arm, Patient Position: Standing, BP Cuff Size: Adult)   Pulse (!) 34   Temp 36.1 °C (96.9 °F) (Temporal)   Wt 115 kg (253 lb 12.8 oz)   SpO2 97%  Body mass index is 39.75 kg/m².    Physical Exam:  Constitutional: Alert, no distress, well-groomed.  Skin: Warm, dry, good turgor, no rashes in visible areas.  Eye: Equal, round and reactive, conjunctiva clear, lids normal.  ENMT: Lips without lesions, good dentition, moist mucous membranes.  Neck: Trachea midline, no masses, no thyromegaly.  Respiratory: Unlabored respiratory effort, no cough.  Abd: soft, non tender, non distended, normal BS  MSK: Normal gait, moves all extremities.  Neuro: Grossly non-focal.   Psych: Alert and oriented x3, normal affect and mood.    EKG Interpretation   Ordered and interpreted by JAYDA Elaine  Rhythm: Sinus Bradycardia  Rate: 32   Ectopy: none   Conduction: left BBB  ST Segments: no acute change   T Waves: no acute change   Q Waves: none   Clinical Impression: Abnormal EKG    ERP interpretation  Interpretive Statements   Junctional rhythm   Nonspecific intraventricular conduction delay   Compared to ECG 05/01/2025 18:02:36   Junctional rhythm now present   Intraventricular conduction delay now present   Atrial fibrillation no longer present   Right-axis deviation no longer  present     Labs: Reviewed    Assessment & Plan:   60 y.o. male with the following -  Assessment & Plan  1. Lightheadedness and syncope: Acute.  - Heart rate is significantly low, ranging from the low to mid 30s. Previous EKGs have shown a fibb with heart rates in the 90s to 120s. Bradycardia could be a side effect of current medication regimen.  However given that he is symptomatic and hypotensive follow-up in the ER is recommended.  - Transport to the hospital via ambulance for further evaluation and management.    1. Type 2 diabetes mellitus with other ophthalmic complication, without long-term current use of insulin ([facility])  POCT Hemoglobin A1C      2. Lightheaded  EKG - Clinic Performed      3. Dizzy  EKG - Clinic Performed      4. Hypotension, unspecified hypotension type  EKG - Clinic Performed      5. Bradycardia by electrocardiogram  EKG - Clinic Performed            Please note that this dictation was created using voice recognition software. I have made every reasonable attempt to correct obvious errors, but I expect that there are errors of grammar and possibly content that I did not discover before finalizing the note.             [1]   Allergies  Allergen Reactions    Metformin Diarrhea     diarrhea

## 2025-06-26 NOTE — ASSESSMENT & PLAN NOTE
Hold home meds for now given concern for symptomatic bradycardia --to avoid hypotension  Resume appropriate

## 2025-06-26 NOTE — PROGRESS NOTES
Pt arrived to unit, cardiologist saw pt at bedside, no immediate intervention per cardiologist, ok to start on cardiac diet.

## 2025-06-26 NOTE — ED PROVIDER NOTES
ER Provider Note    Scribed for Trell Rizvi M.D. by Kathie Sharpe. 6/26/2025   12:12 PM    Primary Care Provider: Natalya Ortega M.D.    CHIEF COMPLAINT  Chief Complaint   Patient presents with    Syncope     Pt had a witnessed syncopal event at an MD appt two weeks ago. Pt has been feeling intermittently dizzy and weak. - chest pain, + dyspnea with exertion     EXTERNAL RECORDS REVIEWED  Outpatient Notes The patient has a history of hypertension, a-fib, and prostate cancer. Reviewed note from earlier today where the patient was seen for syncope two weeks ago. His PCP noted that his blood pressure was 80/60 and a pulse of 34. Reviewed cardiology note from April 30 th where the patient was started on amiodarone.    HPI/ROS  LIMITATION TO HISTORY   Select: : None  OUTSIDE HISTORIAN(S):  None    Ta Stokes is a 60 y.o. male who presents to the ED via EMS for evaluation of an acute syncopal episode onset about two weeks ago. The patient was at a doctors appointment two weeks ago when he had stood up and felt dizzy. He does not remember what happened afterwards, but was told that he had walked down the hallway and had a syncopal episode and a nurse had caught him. Patient reports that he had a doctors appointment today where they had noticed that his blood pressure and heart rate were low. He adds that he has been checking his blood pressure daily and noticed that it has been low recently. The patient states that he has been compliant with his medications. He reports having chest tightness upon exertion, diarrhea, and back pain. The patient notes that he sees Dr. Cochran for cardiology, adding that he was put on amiodarone about 4-5 months ago. The patient also reports that his hand will fall asleep, however denies any numbness, weakness or other stroke symptoms. He states that he has been eating and drinking well. He will sometimes feel short of breath, which is when he uses his oxygen. Denies any  "abdominal pain or pain/burning with urination. The patient has an allergy to Metformin.     PAST MEDICAL HISTORY  Past Medical History[1]    SURGICAL HISTORY  Past Surgical History[2]    FAMILY HISTORY  Family History   Problem Relation Age of Onset    Hypertension Mother     Cancer Maternal Grandmother        SOCIAL HISTORY   reports that he quit smoking about 7 years ago. His smoking use included cigarettes. He started smoking about 46 years ago. He has a 39 pack-year smoking history. He has never used smokeless tobacco. He reports that he does not currently use alcohol. He reports that he does not use drugs.    CURRENT MEDICATIONS  Previous Medications    AMIODARONE (CORDARONE) 200 MG TAB    Take 1 Tablet by mouth 2 times a day.    ATORVASTATIN (LIPITOR) 10 MG TAB    Take 1 Tablet by mouth every evening.    BISOPROLOL (ZEBETA) 5 MG TAB    Take 1 Tablet by mouth every day.    BUDESON-GLYCOPYRROL-FORMOTEROL (BREZTRI AEROSPHERE) 160-9-4.8 MCG/ACT AEROSOL    Inhale.    DICLOFENAC DR (VOLTAREN) 75 MG TABLET DELAYED RESPONSE    Take 1 Tablet by mouth 2 times a day.    ELIQUIS 5 MG TAB    Take 1 tablet by mouth twice daily    EMPAGLIFLOZIN (JARDIANCE) 10 MG TAB TABLET    Take 1 Tablet by mouth every day.    LISINOPRIL (PRINIVIL) 5 MG TAB    Take 1 tablet by mouth once daily    TAMSULOSIN (FLOMAX) 0.4 MG CAPSULE    Take 1 Capsule by mouth at bedtime.       ALLERGIES  Allergies[3]     PHYSICAL EXAM  /62   Pulse (!) 40   Temp 36.3 °C (97.3 °F) (Temporal)   Resp 18   Ht 1.702 m (5' 7\")   Wt 115 kg (253 lb)   SpO2 99%   BMI 39.63 kg/m²      Constitutional: Well developed, Well nourished, Mild distress  HENT: Normocephalic, Atraumatic, Slightly dry mucous membranes.   Eyes: PERRLA, EOMI, Conjunctiva normal, No discharge.   Neck: No tenderness, Supple, No stridor.   Cardiovascular: Bradycardic, Normal rhythm.   Thorax & Lungs: Clear to auscultation bilaterally, No respiratory distress.   Abdomen: Soft, No " tenderness, No masses.   Skin: Warm, Dry, No rash.    Musculoskeletal: 2+ lower extremity edema, No major deformities noted.  Neurologic: Awake, alert. Moves all extremities spontaneously.  Psychiatric: Affect normal, Judgment normal, Mood normal.        DIAGNOSTIC STUDIES    Labs/EKG:   Results for orders placed or performed during the hospital encounter of 06/26/25   CBC w/ Differential    Collection Time: 06/26/25 12:00 PM   Result Value Ref Range    WBC 4.4 (L) 4.8 - 10.8 K/uL    RBC 4.39 (L) 4.70 - 6.10 M/uL    Hemoglobin 13.5 (L) 14.0 - 18.0 g/dL    Hematocrit 41.4 (L) 42.0 - 52.0 %    MCV 94.3 81.4 - 97.8 fL    MCH 30.8 27.0 - 33.0 pg    MCHC 32.6 32.3 - 36.5 g/dL    RDW 48.3 35.9 - 50.0 fL    Platelet Count 143 (L) 164 - 446 K/uL    MPV 9.5 9.0 - 12.9 fL    Neutrophils-Polys 77.70 (H) 44.00 - 72.00 %    Lymphocytes 11.50 (L) 22.00 - 41.00 %    Monocytes 6.40 0.00 - 13.40 %    Eosinophils 3.00 0.00 - 6.90 %    Basophils 0.50 0.00 - 1.80 %    Immature Granulocytes 0.90 0.00 - 0.90 %    Nucleated RBC 0.00 0.00 - 0.20 /100 WBC    Neutrophils (Absolute) 3.38 1.82 - 7.42 K/uL    Lymphs (Absolute) 0.50 (L) 1.00 - 4.80 K/uL    Monos (Absolute) 0.28 0.00 - 0.85 K/uL    Eos (Absolute) 0.13 0.00 - 0.51 K/uL    Baso (Absolute) 0.02 0.00 - 0.12 K/uL    Immature Granulocytes (abs) 0.04 0.00 - 0.11 K/uL    NRBC (Absolute) 0.00 K/uL   Complete Metabolic Panel (CMP)    Collection Time: 06/26/25 12:00 PM   Result Value Ref Range    Sodium 140 135 - 145 mmol/L    Potassium 4.0 3.6 - 5.5 mmol/L    Chloride 111 96 - 112 mmol/L    Co2 21 20 - 33 mmol/L    Anion Gap 8.0 7.0 - 16.0    Glucose 98 65 - 99 mg/dL    Bun 22 8 - 22 mg/dL    Creatinine 0.88 0.50 - 1.40 mg/dL    Calcium 8.4 (L) 8.5 - 10.5 mg/dL    Correct Calcium 8.8 8.5 - 10.5 mg/dL    AST(SGOT) 28 12 - 45 U/L    ALT(SGPT) 41 2 - 50 U/L    Alkaline Phosphatase 47 30 - 99 U/L    Total Bilirubin 0.2 0.1 - 1.5 mg/dL    Albumin 3.5 3.2 - 4.9 g/dL    Total Protein 5.2 (L) 6.0  - 8.2 g/dL    Globulin 1.7 (L) 1.9 - 3.5 g/dL    A-G Ratio 2.1 g/dL   Troponin - STAT Once    Collection Time: 25 12:00 PM   Result Value Ref Range    Troponin T 8 6 - 19 ng/L   proBrain Natriuretic Peptide, NT    Collection Time: 25 12:00 PM   Result Value Ref Range    NT-proBNP 277 (H) 0 - 125 pg/mL   Magnesium    Collection Time: 25 12:00 PM   Result Value Ref Range    Magnesium 1.9 1.5 - 2.5 mg/dL   Phosphorus    Collection Time: 25 12:00 PM   Result Value Ref Range    Phosphorus 3.6 2.5 - 4.5 mg/dL   TSH WITH REFLEX TO FT4    Collection Time: 25 12:00 PM   Result Value Ref Range    TSH 2.820 0.380 - 5.330 uIU/mL   ESTIMATED GFR    Collection Time: 25 12:00 PM   Result Value Ref Range    GFR (CKD-EPI) 99 >60 mL/min/1.73 m 2   Prothrombin time (INR)    Collection Time: 25 12:00 PM   Result Value Ref Range    PT 15.9 (H) 12.0 - 14.6 sec    INR 1.27 (H) 0.87 - 1.13   Urinalysis    Collection Time: 25  2:42 PM    Specimen: Urine   Result Value Ref Range    Color Yellow     Character Clear     Specific Gravity 1.027 <1.035    Ph 5.0 5.0 - 8.0    Glucose >=1000 (A) Negative mg/dL    Ketones Negative Negative mg/dL    Protein Negative Negative mg/dL    Bilirubin Negative Negative    Urobilinogen, Urine 0.2 <=1.0 EU/dL    Nitrite Negative Negative    Leukocyte Esterase Negative Negative    Occult Blood Negative Negative    Micro Urine Req see below    POCT glucose device results    Collection Time: 25  4:27 PM   Result Value Ref Range    POC Glucose, Blood 100 (H) 65 - 99 mg/dL   EKG    Collection Time: 25  5:11 PM   Result Value Ref Range    Report       [facility]    Test Date:  2025  Pt Name:    ALLI KENNEDY                 Department: ER  MRN:        [id number]                      Room:       T811  Gender:     Male                         Technician: SOLITARIO  :        1965                   Requested By:ER TRIAGE PROTOCOL  Order #:     [id number]                    Reading MD:    Measurements  Intervals                                Axis  Rate:       33                           P:          112  NC:         124                          QRS:        113  QRSD:       92                           T:          20  QT:         435  QTc:        323    Interpretive Statements  Sinus bradycardia  Anteroseptal infarct, age indeterminate  Lateral leads are also involved  Compared to ECG 2025 12:05:22  Myocardial infarct finding now present  Junctional rhythm no longer present  Intraventricular conduction delay no longer present     EKG    Collection Time: 25  5:11 PM   Result Value Ref Range    Report       [facility]    Test Date:  2025  Pt Name:    ALLI KENNEDY                 Department: [facility]  MRN:        [id number]                      Room:       11  Gender:     Male                         Technician: SOLITARIO  :        1965                   Requested By:ALYSON MANLEY  Order #:    [id number]                    Reading MD:    Measurements  Intervals                                Axis  Rate:       33                           P:          112  NC:         124                          QRS:        113  QRSD:       92                           T:          20  QT:         435  QTc:        323    Interpretive Statements  Sinus bradycardia  Anteroseptal infarct, age indeterminate  Lateral leads are also involved  Compared to ECG 2025 12:05:22  Myocardial infarct finding now present  Junctional rhythm no longer present  Intraventricular conduction delay no longer present       Unable to sign EKGs, initial EKG shows a junctional rhythm, no evidence of ischemia.  Independently reviewed by myself.    Radiology:   This attending emergency physician has independently interpreted the diagnostic imaging associated with this visit and is awaiting the final reading from the radiologist.   Preliminary interpretation is a follows: No  pneumonia  Radiologist interpretation:   DX-CHEST-PORTABLE (1 VIEW)   Final Result      No acute cardiac or pulmonary abnormalities are identified.      EC-ECHOCARDIOGRAM COMPLETE W/O CONT    (Results Pending)          COURSE & MEDICAL DECISION MAKING         INITIAL ASSESSMENT, COURSE AND PLAN  Differential diagnoses include but not limited to: Bradycardia, Arrhythmia, Thyroid.      Care Narrative: Patient is coming in with bradycardia, received atropine outpatient at his PCPs office.  Patient is asymptomatic but bradycardic.  Patient is on amiodarone and a beta-blocker.  Patient was monitored here, will need to be admitted to the hospital for her syncope.  Discussed case with cardiology, discussed case with the hospitalist for hospitalization.    12:12 PM - Patient was evaluated at bedside. Ordered for DX-Chest, CBC with diff, CMP, Troponin, BNP, Magnesium, Phosphorus, TSH with reflex to FT4, EKG to evaluate. Patient verbalizes understanding and support with my plan of care.     1:37 PM - Paged Cardio.    1:39 PM - I discussed the patient's case and the above findings with Dr. Tony (Hospitalist) who agrees to evaluate the patient for admission.      1:49 PM - I discussed the patient's case and the above findings with Dr. Giraldo (Cardio) who will consult    DISPOSITION AND DISCUSSIONS    I have discussed management of the patient with the following physicians and ADAM's:  Dr. Tony (Hospitalist), Dr. Giraldo (Cardio)     Discussion of management with other hospitals or appropriate source(s): None     DISPOSITION:  Patient will be hospitalized by Dr. Tony in guarded condition.    FINAL DIAGNOSIS  1. Syncope, unspecified syncope type    2. Junctional bradycardia         I, Kathie Duvall), am scribing for, and in the presence of, Trell Rizvi M.D..    Electronically signed by: Kathie Duvall), 6/26/2025    ITrell M.D. personally performed the services described in this documentation,  as scribed by Kathie Sharpe in my presence, and it is both accurate and complete.      The note accurately reflects work and decisions made by me.  Trell Rizvi M.D.  6/26/2025  7:47 PM         [1]   Past Medical History:  Diagnosis Date    Arrhythmia     A-fib    Cancer (HCC) 01/2024    prostate cancer    Dental disorder     upper/lower dentures    Dyslipidemia     GERD (gastroesophageal reflux disease)     Heart burn     High cholesterol     Hypertension     Indigestion     Sleep apnea     Snoring    [2]   Past Surgical History:  Procedure Laterality Date    RADIO FREQUENCY ABLATION ADDITIONAL LEVEL Bilateral 5/20/2025    Procedure: RIGHT and LEFT radiofrequency neurotomies medial branch targeting the L3-4, L4-5, and L5-S1 facet joints with fluoroscopic guidance and sedation…..Note: plan for sedation with 0.5mg versed and 25 mcg fentanyl. Plan for 80 °C for 90 seconds for each neurotomy;  Surgeon: Satya Ponce M.D.;  [facility];  Service: Pain Management    LUMBAR MEDIAL BRANCH BLOCKS Bilateral 4/29/2025    Procedure: Diagnostic medial branch blocks targeting the BILATERAL L3-4, L4-5, and L5-S1 facet joints with fluoroscopic guidance #2…Note: Diagnostic medial branch block #2 is only be done if procedure #1 is a positive block.  This will be on a separate date from procedure #1.;  Surgeon: Satya Ponce M.D.;  Location: [facility];  Service: Pain Management    LUMBAR MEDIAL BRANCH BLOCKS Bilateral 4/16/2025    Procedure: Diagnostic medial branch blocks targeting the BILATERAL L3-4, L4-5, and L5-S1 facet joints with fluoroscopic guidance;  Surgeon: Satya Ponce M.D.;  [facility]: [facility];  Service: Pain Management    EMG/NCS  2/3/2025   [3]   Allergies  Allergen Reactions    Metformin Diarrhea

## 2025-06-26 NOTE — PROGRESS NOTES
4 Eyes Skin Assessment Completed by KIRSTY Tee and ANDREWS Jordan.    Skin assessment is primarily focused on high risk bony prominences. Pay special attention to skin beneath and around medical devices, high risk bony prominences, skin to skin areas and areas where the patient lacks sensation to feel pain and areas where the patient previously had breakdown.     Head (Occipital):  WDL   Ears (Under Medical Devices): WDL   Nose (Under Medical Devices): WDL   Mouth:  WDL   Neck: WDL   Breast/Chest:  WDL   Shoulder Blades:  WDL   Spine:   WDL   (R) Arm/Elbow/Hand: WDL   (L) Arm/Elbow/Hand: WDL   Abdomen: WDL   Pannus/Groin:  WDL   Sacrum/Coccyx:   WDL   (R) Ischial Tuberosity (Sit Bones):  WDL   (L) Ischial Tuberosity (Sit Bones):  WDL   (R) Leg:  WDL   (L) Leg:  WDL   (R) Heel:  WDL   (R) Foot/Toe: WDL   (L) Heel: WDL   (L) Foot/Toe:  WDL       DEVICES IN USE:   Respiratory Devices:  Nasal cannula  Feeding Devices:  N/A   Lines & BP Monitoring Devices:  Peripheral IV    Orthopedic Devices:  N/A  Miscellaneous Devices:  Telemetry monitor    PROTOCOL INTERVENTIONS:   Standard/Trauma Bed:  Already in place    WOUND PHOTOS:   N/A no wounds identified    WOUND CONSULT:   N/A, no advanced wound care needs identified

## 2025-06-26 NOTE — ED TRIAGE NOTES
"Chief Complaint   Patient presents with    Syncope     Pt had a witnessed syncopal event at an MD appt two weeks ago. Pt has been feeling intermittently dizzy and weak. - chest pain, + dyspnea with exertion       Pt BIBA from PCP appointment with the above complaint. Pt with initial heart rate of 30 at PCP, given 1mg atropine by EMS. Pt is GCS 15, FSBG 106.     /62   Pulse (!) 40   Temp 36.3 °C (97.3 °F) (Temporal)   Resp 18   Ht 1.702 m (5' 7\")   Wt 115 kg (253 lb)   SpO2 99%   BMI 39.63 kg/m²     "

## 2025-06-27 LAB
ALBUMIN SERPL BCP-MCNC: 3.9 G/DL (ref 3.2–4.9)
ALBUMIN/GLOB SERPL: 1.8 G/DL
ALP SERPL-CCNC: 48 U/L (ref 30–99)
ALT SERPL-CCNC: 48 U/L (ref 2–50)
ANION GAP SERPL CALC-SCNC: 9 MMOL/L (ref 7–16)
AST SERPL-CCNC: 28 U/L (ref 12–45)
BASOPHILS # BLD AUTO: 0.6 % (ref 0–1.8)
BASOPHILS # BLD: 0.03 K/UL (ref 0–0.12)
BILIRUB SERPL-MCNC: 0.6 MG/DL (ref 0.1–1.5)
BUN SERPL-MCNC: 21 MG/DL (ref 8–22)
CALCIUM ALBUM COR SERPL-MCNC: 9.3 MG/DL (ref 8.5–10.5)
CALCIUM SERPL-MCNC: 9.2 MG/DL (ref 8.5–10.5)
CHLORIDE SERPL-SCNC: 106 MMOL/L (ref 96–112)
CK SERPL-CCNC: 111 U/L (ref 0–154)
CO2 SERPL-SCNC: 22 MMOL/L (ref 20–33)
CREAT SERPL-MCNC: 0.93 MG/DL (ref 0.5–1.4)
EOSINOPHIL # BLD AUTO: 0.17 K/UL (ref 0–0.51)
EOSINOPHIL NFR BLD: 3.5 % (ref 0–6.9)
ERYTHROCYTE [DISTWIDTH] IN BLOOD BY AUTOMATED COUNT: 47.7 FL (ref 35.9–50)
GFR SERPLBLD CREATININE-BSD FMLA CKD-EPI: 94 ML/MIN/1.73 M 2
GLOBULIN SER CALC-MCNC: 2.2 G/DL (ref 1.9–3.5)
GLUCOSE BLD STRIP.AUTO-MCNC: 109 MG/DL (ref 65–99)
GLUCOSE BLD STRIP.AUTO-MCNC: 118 MG/DL (ref 65–99)
GLUCOSE BLD STRIP.AUTO-MCNC: 132 MG/DL (ref 65–99)
GLUCOSE BLD STRIP.AUTO-MCNC: 140 MG/DL (ref 65–99)
GLUCOSE SERPL-MCNC: 104 MG/DL (ref 65–99)
HCT VFR BLD AUTO: 44.9 % (ref 42–52)
HGB BLD-MCNC: 14.5 G/DL (ref 14–18)
IMM GRANULOCYTES # BLD AUTO: 0.05 K/UL (ref 0–0.11)
IMM GRANULOCYTES NFR BLD AUTO: 1 % (ref 0–0.9)
LYMPHOCYTES # BLD AUTO: 0.6 K/UL (ref 1–4.8)
LYMPHOCYTES NFR BLD: 12.2 % (ref 22–41)
MAGNESIUM SERPL-MCNC: 2.2 MG/DL (ref 1.5–2.5)
MCH RBC QN AUTO: 30.7 PG (ref 27–33)
MCHC RBC AUTO-ENTMCNC: 32.3 G/DL (ref 32.3–36.5)
MCV RBC AUTO: 94.9 FL (ref 81.4–97.8)
MONOCYTES # BLD AUTO: 0.32 K/UL (ref 0–0.85)
MONOCYTES NFR BLD AUTO: 6.5 % (ref 0–13.4)
NEUTROPHILS # BLD AUTO: 3.75 K/UL (ref 1.82–7.42)
NEUTROPHILS NFR BLD: 76.2 % (ref 44–72)
NRBC # BLD AUTO: 0 K/UL
NRBC BLD-RTO: 0 /100 WBC (ref 0–0.2)
PHOSPHATE SERPL-MCNC: 3.8 MG/DL (ref 2.5–4.5)
PLATELET # BLD AUTO: 146 K/UL (ref 164–446)
PMV BLD AUTO: 9.4 FL (ref 9–12.9)
POTASSIUM SERPL-SCNC: 4.6 MMOL/L (ref 3.6–5.5)
PROT SERPL-MCNC: 6.1 G/DL (ref 6–8.2)
RBC # BLD AUTO: 4.73 M/UL (ref 4.7–6.1)
SODIUM SERPL-SCNC: 137 MMOL/L (ref 135–145)
TROPONIN T SERPL-MCNC: 8 NG/L (ref 6–19)
WBC # BLD AUTO: 4.9 K/UL (ref 4.8–10.8)

## 2025-06-27 PROCEDURE — 82550 ASSAY OF CK (CPK): CPT

## 2025-06-27 PROCEDURE — 99232 SBSQ HOSP IP/OBS MODERATE 35: CPT | Mod: FS | Performed by: INTERNAL MEDICINE

## 2025-06-27 PROCEDURE — 84484 ASSAY OF TROPONIN QUANT: CPT

## 2025-06-27 PROCEDURE — A9270 NON-COVERED ITEM OR SERVICE: HCPCS | Performed by: STUDENT IN AN ORGANIZED HEALTH CARE EDUCATION/TRAINING PROGRAM

## 2025-06-27 PROCEDURE — 700102 HCHG RX REV CODE 250 W/ 637 OVERRIDE(OP): Performed by: STUDENT IN AN ORGANIZED HEALTH CARE EDUCATION/TRAINING PROGRAM

## 2025-06-27 PROCEDURE — 85025 COMPLETE CBC W/AUTO DIFF WBC: CPT

## 2025-06-27 PROCEDURE — 83735 ASSAY OF MAGNESIUM: CPT

## 2025-06-27 PROCEDURE — 84100 ASSAY OF PHOSPHORUS: CPT

## 2025-06-27 PROCEDURE — 770020 HCHG ROOM/CARE - TELE (206)

## 2025-06-27 PROCEDURE — 99233 SBSQ HOSP IP/OBS HIGH 50: CPT | Performed by: HOSPITALIST

## 2025-06-27 PROCEDURE — 82962 GLUCOSE BLOOD TEST: CPT | Performed by: HOSPITALIST

## 2025-06-27 PROCEDURE — 80053 COMPREHEN METABOLIC PANEL: CPT

## 2025-06-27 RX ADMIN — ATORVASTATIN CALCIUM 10 MG: 10 TABLET, FILM COATED ORAL at 20:27

## 2025-06-27 RX ADMIN — APIXABAN 5 MG: 5 TABLET, FILM COATED ORAL at 16:06

## 2025-06-27 RX ADMIN — TAMSULOSIN HYDROCHLORIDE 0.4 MG: 0.4 CAPSULE ORAL at 20:28

## 2025-06-27 RX ADMIN — APIXABAN 5 MG: 5 TABLET, FILM COATED ORAL at 05:22

## 2025-06-27 RX ADMIN — GABAPENTIN 100 MG: 100 CAPSULE ORAL at 20:28

## 2025-06-27 RX ADMIN — FLUTICASONE FUROATE, UMECLIDINIUM BROMIDE AND VILANTEROL TRIFENATATE 1 PUFF: 200; 62.5; 25 POWDER RESPIRATORY (INHALATION) at 05:22

## 2025-06-27 ASSESSMENT — ENCOUNTER SYMPTOMS
HEARTBURN: 0
DIARRHEA: 0
PND: 0
ARTHRALGIAS: 0
CONSTIPATION: 0
BLURRED VISION: 0
NAUSEA: 0
BRUISES/BLEEDS EASILY: 0
ABDOMINAL PAIN: 0
HEADACHES: 0
PSYCHIATRIC NEGATIVE: 1
EYES NEGATIVE: 1
DEPRESSION: 0
CHEST TIGHTNESS: 0
FATIGUE: 0
WHEEZING: 0
CHILLS: 0
HEMOPTYSIS: 0
FEVER: 0
ABDOMINAL DISTENTION: 0
SHORTNESS OF BREATH: 0
VOMITING: 0
DOUBLE VISION: 0
ENDOCRINE NEGATIVE: 1
DIAPHORESIS: 0
MYALGIAS: 0
BACK PAIN: 0
PALPITATIONS: 0
COUGH: 0
DIZZINESS: 0
COLOR CHANGE: 0
CLAUDICATION: 0
LIGHT-HEADEDNESS: 0

## 2025-06-27 ASSESSMENT — PAIN DESCRIPTION - PAIN TYPE
TYPE: ACUTE PAIN
TYPE: ACUTE PAIN

## 2025-06-27 ASSESSMENT — FIBROSIS 4 INDEX: FIB4 SCORE: 1.8

## 2025-06-27 NOTE — PROGRESS NOTES
Hospital Medicine Daily Progress Note    Date of Service  6/27/2025    Chief Complaint  Ta Stokes is a 60 y.o. male admitted 6/26/2025 with bradycardia    Hospital Course  Ta Stokes is a 60 y.o. male with history of chronic A-fib on amiodarone/bisoprolol/Eliquis, hypertension, hyperlipidemia, diabetes, prostate cancer who presented 6/26/2025 with evaluation for bradycardia, syncopal episode.  Patient has been taking amiodarone and bisoprolol for atrial fibrillation.  Earlier today, he was seen at PCP office, noted to have been bradycardic and had a syncopal episode.  Patient received atropine 0.5 mg by EMS.  In ER, HR in 30s to 40s, atrial fibrillation.  Cardiology was consulted, formal evaluation to follow.  Admitted to medicine service for further evaluation and treatment.     Interval Problem Update  6/27: Patient is in bed, patient is alert oriented follows commands, denies any dizziness or lightheadedness, no focal weakness no numbness or tingling, continue telemetry monitoring, heart rates in the mid, blood pressure is stable, discussed with cardiology we will continue monitoring continue holding beta-blockers and amiodarone, patient will require at least another 24 to 48 hours to see if heart rate is improving otherwise might require pacemaker placement    I have discussed this patient's plan of care and discharge plan at IDT rounds today with Case Management, Nursing, Nursing leadership, and other members of the IDT team.    Consultants/Specialty  Radiology    Code Status  Full Code    Disposition  The patient is not medically cleared for discharge to home or a post-acute facility.      I have placed the appropriate orders for post-discharge needs.    Review of Systems  Review of Systems   Constitutional:  Negative for chills and fever.   Eyes:  Negative for blurred vision and double vision.   Respiratory:  Negative for cough, hemoptysis and wheezing.    Cardiovascular:  Negative for chest  pain, palpitations, claudication, leg swelling and PND.   Gastrointestinal:  Negative for heartburn, nausea and vomiting.   Genitourinary:  Negative for hematuria and urgency.   Musculoskeletal:  Negative for back pain and myalgias.   Skin:  Negative for rash.   Neurological:  Negative for dizziness and headaches.   Endo/Heme/Allergies:  Does not bruise/bleed easily.   Psychiatric/Behavioral:  Negative for depression.         Physical Exam  Temp:  [36.2 °C (97.2 °F)-36.6 °C (97.8 °F)] 36.2 °C (97.2 °F)  Pulse:  [17-45] 45  Resp:  [16-18] 16  BP: (109-146)/(59-74) 146/74  SpO2:  [91 %-97 %] 96 %    Physical Exam  Vitals and nursing note reviewed.   Constitutional:       Appearance: Normal appearance. He is not ill-appearing.   Eyes:      General: No scleral icterus.        Right eye: No discharge.         Left eye: No discharge.   Cardiovascular:      Rate and Rhythm: Normal rate and regular rhythm.      Pulses: Normal pulses.      Heart sounds: Normal heart sounds.   Pulmonary:      Effort: Pulmonary effort is normal.      Breath sounds: Normal breath sounds.   Abdominal:      General: Bowel sounds are normal. There is no distension.      Tenderness: There is no abdominal tenderness.   Musculoskeletal:         General: Normal range of motion.      Cervical back: Normal range of motion and neck supple.      Right lower leg: No edema.      Left lower leg: No edema.   Skin:     General: Skin is warm and dry.      Capillary Refill: Capillary refill takes less than 2 seconds.      Coloration: Skin is not jaundiced.   Neurological:      General: No focal deficit present.      Mental Status: He is alert and oriented to person, place, and time.      Cranial Nerves: No cranial nerve deficit.   Psychiatric:         Mood and Affect: Mood normal.         Fluids    Intake/Output Summary (Last 24 hours) at 6/27/2025 1634  Last data filed at 6/26/2025 2313  Gross per 24 hour   Intake 0 ml   Output 0 ml   Net 0 ml         Laboratory  Recent Labs     06/26/25  1200 06/27/25  0752   WBC 4.4* 4.9   RBC 4.39* 4.73   HEMOGLOBIN 13.5* 14.5   HEMATOCRIT 41.4* 44.9   MCV 94.3 94.9   MCH 30.8 30.7   MCHC 32.6 32.3   RDW 48.3 47.7   PLATELETCT 143* 146*   MPV 9.5 9.4     Recent Labs     06/26/25  1200 06/27/25  0752   SODIUM 140 137   POTASSIUM 4.0 4.6   CHLORIDE 111 106   CO2 21 22   GLUCOSE 98 104*   BUN 22 21   CREATININE 0.88 0.93   CALCIUM 8.4* 9.2     Recent Labs     06/26/25  1200   INR 1.27*               Imaging  DX-CHEST-PORTABLE (1 VIEW)   Final Result      No acute cardiac or pulmonary abnormalities are identified.      EC-ECHOCARDIOGRAM COMPLETE W/O CONT    (Results Pending)        Assessment/Plan  * Symptomatic bradycardia- (present on admission)  Assessment & Plan  Possibly secondary to concomitant intake of amiodarone and bisoprolol  --Hold AV lexi blocking agent  -s/p atropine 0.5 mg by EMS    Calcium gluconate 2g for BB reversal  Target K>4.0 and Mg>2.0  Telemetry monitoring  Check TTE  Cardiology consulted, follow-up appreciated  --Allow BB and amiodarone to washout  --No immediate plan for PPM at this time    Diabetes (HCC)  Assessment & Plan  ISS    Class 2 obesity due to excess calories in adult  Assessment & Plan  Diet and lifestyle modification  Body mass index is 39.62 kg/m².    Syncope and collapse  Assessment & Plan  Likely secondary to symptomatic bradycardia  Telemetry monitoring  Check TTE, orthostatic vital    Carcinoma of prostate (HCC)- (present on admission)  Assessment & Plan  For history of  Continue Flomax    Dyslipidemia- (present on admission)  Assessment & Plan  Statin    Essential hypertension- (present on admission)  Assessment & Plan  Hold home meds for now given concern for symptomatic bradycardia --to avoid hypotension  Resume appropriate         VTE prophylaxis: Already on Eliquis    I have performed a physical exam and reviewed and updated ROS and Plan today (6/27/2025). In review of  yesterday's note (6/26/2025), there are no changes except as documented above.      I discussed with cardiology  I reviewed EKG  I reviewed note from admitting physician  I reviewed note from ER physician  I reviewed CBC  Reviewed BMP  I reviewed troponin level  I reviewed CPK levels  I reviewed phosphorus and magnesium levels  I reviewed glucose levels  I discussed with clinical pharmacist      My total time spent caring for the patient on the day of the encounter was 53 minutes.   This does not include time spent on separately billable procedures/tests.

## 2025-06-27 NOTE — PROGRESS NOTES
Fall Risk Score: HIGH RISK  Fall risk interventions in place: Place yellow fall risk ID band on patient, Provide patient/family education based on risk assessment, Educate patient/family to call staff for assistance when getting out of bed, Place fall precaution signage outside patient door, Place patient in room close to nursing station, Utilize bed/chair fall alarm, Notify charge of high risk for huddle, and Bed alarm connected correctly  Bed type: Regular (Hema Score less than 17 interventions in place)  Patient on cardiac monitor: Yes  IVF/IV medications: Not Applicable   Oxygen: How many liters 2L and Traced the line to wall oxygen  Bedside sitter: Not Applicable   Isolation: Not applicable

## 2025-06-27 NOTE — PROGRESS NOTES
Cardiology Follow Up Progress Note    Date of Service  6/27/2025    Attending Physician  RAMA Mcnally.*    Chief Complaint   Dizziness and weakness    HPI  Dieudonne Stokes is a 60 y.o. male with a history of paroxysmal atrial fibrillation on Eliquis, hypertension and diabetes mellitus type 2 admitted 6/26/2025 with symptomatic bradycardia.    Interim Events  6/27/2025: No cardiac events overnight.  Sinus bradycardia on telemetry.  Vital signs stable.  SpO2 96% on 2 L nasal cannula.  He reports he is feeling well today, but he notes that he has not ambulated.  He does not have any current cardiac symptoms. No chest pain or palpitations. No shortness of breath, dyspnea on exertion, orthopnea or PND. No lower extremity edema. No dizziness or lightheadedness. No syncope or presyncope.      Review of Systems  Review of Systems   Constitutional:  Negative for chills, diaphoresis, fatigue and fever.   HENT: Negative.     Eyes: Negative.    Respiratory:  Negative for cough, chest tightness and shortness of breath.    Cardiovascular:  Negative for chest pain, palpitations and leg swelling.   Gastrointestinal:  Negative for abdominal distention, abdominal pain, constipation, diarrhea, nausea and vomiting.   Endocrine: Negative.    Genitourinary:  Negative for decreased urine volume, difficulty urinating, dysuria, frequency and urgency.   Musculoskeletal:  Negative for arthralgias and myalgias.   Skin:  Negative for color change.   Neurological:  Negative for dizziness, syncope and light-headedness.   Hematological:  Does not bruise/bleed easily.   Psychiatric/Behavioral: Negative.         Vital signs in last 24 hours  Temp:  [36.3 °C (97.3 °F)-36.6 °C (97.9 °F)] 36.4 °C (97.5 °F)  Pulse:  [17-43] 42  Resp:  [16-18] 16  BP: (105-131)/(55-69) 109/67  SpO2:  [91 %-99 %] 96 %    Physical Exam  Physical Exam  Vitals and nursing note reviewed.   Constitutional:       General: He is not in acute distress.      Appearance: Normal appearance. He is not toxic-appearing.   HENT:      Head: Normocephalic and atraumatic.      Right Ear: External ear normal.      Left Ear: External ear normal.      Nose: Nose normal.      Mouth/Throat:      Mouth: Mucous membranes are moist.      Pharynx: Oropharynx is clear.   Eyes:      General: No scleral icterus.     Extraocular Movements: Extraocular movements intact.      Conjunctiva/sclera: Conjunctivae normal.      Pupils: Pupils are equal, round, and reactive to light.   Neck:      Vascular: No JVD.   Cardiovascular:      Rate and Rhythm: Regular rhythm. Bradycardia present.      Pulses: Normal pulses.      Heart sounds: Normal heart sounds. No murmur heard.     No friction rub. No gallop.   Pulmonary:      Effort: Pulmonary effort is normal.      Breath sounds: Normal breath sounds.   Abdominal:      General: Abdomen is flat. Bowel sounds are normal. There is no distension.      Palpations: Abdomen is soft.      Tenderness: There is no abdominal tenderness.   Musculoskeletal:         General: Normal range of motion.      Cervical back: Normal range of motion and neck supple.      Right lower leg: No edema.      Left lower leg: No edema.   Skin:     General: Skin is warm and dry.      Capillary Refill: Capillary refill takes less than 2 seconds.      Coloration: Skin is not jaundiced.   Neurological:      General: No focal deficit present.      Mental Status: He is alert and oriented to person, place, and time. Mental status is at baseline.   Psychiatric:         Mood and Affect: Mood normal.         Behavior: Behavior normal.         Judgment: Judgment normal.         Lab Review  Lab Results   Component Value Date/Time    WBC 4.9 06/27/2025 07:52 AM    RBC 4.73 06/27/2025 07:52 AM    HEMOGLOBIN 14.5 06/27/2025 07:52 AM    HEMATOCRIT 44.9 06/27/2025 07:52 AM    MCV 94.9 06/27/2025 07:52 AM    MCH 30.7 06/27/2025 07:52 AM    MCHC 32.3 06/27/2025 07:52 AM    MPV 9.4 06/27/2025 07:52 AM       Lab Results   Component Value Date/Time    SODIUM 137 06/27/2025 07:52 AM    POTASSIUM 4.6 06/27/2025 07:52 AM    CHLORIDE 106 06/27/2025 07:52 AM    CO2 22 06/27/2025 07:52 AM    GLUCOSE 104 (H) 06/27/2025 07:52 AM    BUN 21 06/27/2025 07:52 AM    CREATININE 0.93 06/27/2025 07:52 AM      Lab Results   Component Value Date/Time    ASTSGOT 28 06/27/2025 07:52 AM    ALTSGPT 48 06/27/2025 07:52 AM     Lab Results   Component Value Date/Time    CHOLSTRLTOT 153 08/10/2024 07:20 AM    LDL 73 08/10/2024 07:20 AM    HDL 50 08/10/2024 07:20 AM    TRIGLYCERIDE 152 (H) 08/10/2024 07:20 AM    TROPONINT 8 06/27/2025 07:52 AM       Recent Labs     06/26/25  1200   NTPROBNP 277*       Cardiac Imaging and Procedures Review    Echocardiogram: Pending    Imaging  Chest X-Ray:  6/26/2025   FINDINGS:  Heart size is moderately enlarged  No pulmonary infiltrates or consolidations are noted.  No pleural abnormalities are noted.     IMPRESSION:     No acute cardiac or pulmonary abnormalities are identified.      Assessment/Plan  #Symptomatic bradycardia  #Junctional rhythm  #Paroxysmal atrial fibrillation  #Hypertension  #Diabetes mellitus type 2    Recommendations:  - No further symptoms, but he has not been ambulatory.  -Continued bradycardia on telemetry.  -Bradycardia likely due to concurrent use of amiodarone and beta-blockade.  -Continue to hold bisoprolol/amiodarone and allow for beta-blocker/amiodarone washout.  -Continue Eliquis 5 mg twice daily.  -Continue atorvastatin 10 mg daily.  -Echocardiogram is currently pending.  - We will continue to allow for washout.  If no improvement by Monday, will plan for EP consult.    Cardiology will continue to follow.      Please contact me with any questions.    Thank you for allowing me to participate in the care of Taarianna Stokes .    Delilah Curran PA-C, Cardiology  [facility], [facility]  [address]  [address]  [place] [address]  Phone: [phone]  Fax: [phone]    PLEASE NOTE: This note was created using voice recognition software. I have made every reasonable attempt to correct obvious errors, but I expect that there are errors of grammar and possibly content that I did not discover before finalizing the note.     I personally spent a total of 15 minutes which includes face-to-face time and non-face-to-face time spent on preparing to see the patient, reviewing hospital notes and tests, obtaining history from the patient, performing a medically appropriate exam, counseling and educating the patient, ordering medications/tests/procedures/referrals as clinically indicated, and documenting information in the electronic medical record.

## 2025-06-27 NOTE — CARE PLAN
The patient is Watcher - Medium risk of patient condition declining or worsening    Shift Goals  Clinical Goals: VSS, cardiac monitor, hemodynamic stability  Patient Goals: sleep poc  Family Goals: loyda    Progress made toward(s) clinical / shift goals:    Problem: Knowledge Deficit - Standard  Goal: Patient and family/care givers will demonstrate understanding of plan of care, disease process/condition, diagnostic tests and medications  Outcome: Progressing  Note: Discussed plan of care, pt able to actively participate in the learning process and demonstrates understanding by return demonstration or return explanation. Improved ability to communicate regarding their healthcare and current admission. Improved ability to identify barriers to learning and care.       Problem: Hemodynamics  Goal: Patient's hemodynamics, fluid balance and neurologic status will be stable or improve  Outcome: Progressing  Note: Vital signs stable, CMS intact, signs of bleeding assessed. I/O monitored. IV fluids not in use. Oral intake adequate. Peripheral pulses assessed and monitored. Capillary refill assessed. PRN blood pressure control meds given as needed.             Problem: Fall Risk  Goal: Patient will remain free from falls  Outcome: Progressing  Note: Fall prevention measures in place, bed alarm on and connected correctly, call light within reach, hourly rounding, Education provided on fall prevention. Pt instructed to use call light prior to exiting the bed, educated on use of bed alarms, and risks and complications related to falls. Medication orders evaluated for fall risk, gait/mobility assessed, sensory deficits assessed, mental status assessed, assessed for hypotension, hypovolemia, weakness, fatigue, elimination, and confusion.         Patient is not progressing towards the following goals:

## 2025-06-27 NOTE — HOSPITAL COURSE
Ta Stokes is a 60 y.o. male with history of chronic A-fib on amiodarone/bisoprolol/Eliquis, hypertension, hyperlipidemia, diabetes, prostate cancer who presented 6/26/2025 with evaluation for bradycardia, syncopal episode.  Patient has been taking amiodarone and bisoprolol for atrial fibrillation.  Earlier today, he was seen at PCP office, noted to have been bradycardic and had a syncopal episode.  Patient received atropine 0.5 mg by EMS.  In ER, HR in 30s to 40s, atrial fibrillation.  Cardiology was consulted, formal evaluation to follow.  Admitted to medicine service for further evaluation and treatment.

## 2025-06-28 ENCOUNTER — APPOINTMENT (OUTPATIENT)
Dept: CARDIOLOGY | Facility: MEDICAL CENTER | Age: 60
End: 2025-06-28
Attending: PHYSICIAN ASSISTANT
Payer: MEDICAID

## 2025-06-28 VITALS
WEIGHT: 236.99 LBS | HEART RATE: 51 BPM | SYSTOLIC BLOOD PRESSURE: 140 MMHG | RESPIRATION RATE: 17 BRPM | OXYGEN SATURATION: 91 % | TEMPERATURE: 98.2 F | HEIGHT: 67 IN | DIASTOLIC BLOOD PRESSURE: 77 MMHG | BODY MASS INDEX: 37.2 KG/M2

## 2025-06-28 PROBLEM — R55 SYNCOPE AND COLLAPSE: Status: RESOLVED | Noted: 2025-06-26 | Resolved: 2025-06-28

## 2025-06-28 PROBLEM — R00.1 SYMPTOMATIC BRADYCARDIA: Status: RESOLVED | Noted: 2025-06-26 | Resolved: 2025-06-28

## 2025-06-28 LAB
ANION GAP SERPL CALC-SCNC: 11 MMOL/L (ref 7–16)
BUN SERPL-MCNC: 28 MG/DL (ref 8–22)
CALCIUM SERPL-MCNC: 9.4 MG/DL (ref 8.5–10.5)
CHLORIDE SERPL-SCNC: 107 MMOL/L (ref 96–112)
CO2 SERPL-SCNC: 20 MMOL/L (ref 20–33)
CREAT SERPL-MCNC: 0.96 MG/DL (ref 0.5–1.4)
EKG IMPRESSION: NORMAL
GFR SERPLBLD CREATININE-BSD FMLA CKD-EPI: 91 ML/MIN/1.73 M 2
GLUCOSE BLD STRIP.AUTO-MCNC: 125 MG/DL (ref 65–99)
GLUCOSE BLD STRIP.AUTO-MCNC: 151 MG/DL (ref 65–99)
GLUCOSE SERPL-MCNC: 110 MG/DL (ref 65–99)
LV EJECT FRACT MOD 2C 99903: 68.07
LV EJECT FRACT MOD 4C 99902: 71.26
LV EJECT FRACT MOD BP 99901: 69.1
MAGNESIUM SERPL-MCNC: 2.1 MG/DL (ref 1.5–2.5)
POTASSIUM SERPL-SCNC: 4.5 MMOL/L (ref 3.6–5.5)
SODIUM SERPL-SCNC: 138 MMOL/L (ref 135–145)

## 2025-06-28 PROCEDURE — 94660 CPAP INITIATION&MGMT: CPT

## 2025-06-28 PROCEDURE — 700117 HCHG RX CONTRAST REV CODE 255: Performed by: PHYSICIAN ASSISTANT

## 2025-06-28 PROCEDURE — 700102 HCHG RX REV CODE 250 W/ 637 OVERRIDE(OP): Performed by: STUDENT IN AN ORGANIZED HEALTH CARE EDUCATION/TRAINING PROGRAM

## 2025-06-28 PROCEDURE — 82962 GLUCOSE BLOOD TEST: CPT | Performed by: HOSPITALIST

## 2025-06-28 PROCEDURE — 93005 ELECTROCARDIOGRAM TRACING: CPT | Mod: TC | Performed by: HOSPITALIST

## 2025-06-28 PROCEDURE — 99239 HOSP IP/OBS DSCHRG MGMT >30: CPT | Performed by: HOSPITALIST

## 2025-06-28 PROCEDURE — 99232 SBSQ HOSP IP/OBS MODERATE 35: CPT | Mod: FS | Performed by: INTERNAL MEDICINE

## 2025-06-28 PROCEDURE — 80048 BASIC METABOLIC PNL TOTAL CA: CPT

## 2025-06-28 PROCEDURE — 83735 ASSAY OF MAGNESIUM: CPT

## 2025-06-28 PROCEDURE — 93306 TTE W/DOPPLER COMPLETE: CPT | Mod: 26 | Performed by: INTERNAL MEDICINE

## 2025-06-28 PROCEDURE — 93306 TTE W/DOPPLER COMPLETE: CPT

## 2025-06-28 PROCEDURE — A9270 NON-COVERED ITEM OR SERVICE: HCPCS | Performed by: STUDENT IN AN ORGANIZED HEALTH CARE EDUCATION/TRAINING PROGRAM

## 2025-06-28 RX ADMIN — HUMAN ALBUMIN MICROSPHERES AND PERFLUTREN 3 ML: 10; .22 INJECTION, SOLUTION INTRAVENOUS at 12:18

## 2025-06-28 RX ADMIN — FLUTICASONE FUROATE, UMECLIDINIUM BROMIDE AND VILANTEROL TRIFENATATE 1 PUFF: 200; 62.5; 25 POWDER RESPIRATORY (INHALATION) at 04:07

## 2025-06-28 RX ADMIN — APIXABAN 5 MG: 5 TABLET, FILM COATED ORAL at 04:07

## 2025-06-28 RX ADMIN — ACETAMINOPHEN 650 MG: 325 TABLET ORAL at 11:02

## 2025-06-28 ASSESSMENT — ENCOUNTER SYMPTOMS
CHEST TIGHTNESS: 0
LIGHT-HEADEDNESS: 0
COLOR CHANGE: 0
ABDOMINAL PAIN: 0
PALPITATIONS: 0
MYALGIAS: 0
ARTHRALGIAS: 0
EYES NEGATIVE: 1
ABDOMINAL DISTENTION: 0
CONSTIPATION: 0
ENDOCRINE NEGATIVE: 1
PSYCHIATRIC NEGATIVE: 1
SHORTNESS OF BREATH: 0
DIZZINESS: 0
DIARRHEA: 0
FATIGUE: 0
DIAPHORESIS: 0
FEVER: 0
BRUISES/BLEEDS EASILY: 0
VOMITING: 0
CHILLS: 0
NAUSEA: 0
COUGH: 0

## 2025-06-28 ASSESSMENT — FIBROSIS 4 INDEX: FIB4 SCORE: 1.63

## 2025-06-28 ASSESSMENT — PAIN DESCRIPTION - PAIN TYPE: TYPE: ACUTE PAIN

## 2025-06-28 NOTE — PROGRESS NOTES
..Bedside report received from off going RN/tech: Mckayla, assumed care of patient.     Fall Risk Score: HIGH RISK  Fall risk interventions in place: Place yellow fall risk ID band on patient, Provide patient/family education based on risk assessment, and Educate patient/family to call staff for assistance when getting out of bed  Bed type: Low air loss (Hema Score less than 17 interventions in place)  Patient on cardiac monitor: Yes  IVF/IV medications: Not Applicable   Oxygen: How many liters 2L  Bedside sitter: no  Isolation: Not applicable

## 2025-06-28 NOTE — PROGRESS NOTES
Discharge orders received.  Patient arrived to the discharge lounge.  PIV removed by bedside RN prior to arrival. AVS instructions given, medications reviewed and general discharge education provided to patient.  Follow up appointments discussed.  Patient verbalized understanding of dc instructions and prescriptions.  Patient signed discharge instructions.  Patient verbalized understanding and had all belongings with him.  Patient left with family. Wished patient a speedy recovery.

## 2025-06-28 NOTE — PROGRESS NOTES
Monitor Summary  Rhythm: SB  Rate: 42-57  Ectopy: F PAC, O trigem, Accelerated junctional, HR touched down to 32  .13 / .08 / 0.4

## 2025-06-28 NOTE — CARE PLAN
The patient is Watcher - Medium risk of patient condition declining or worsening    Shift Goals  Clinical Goals: Monitor HR, monitor labs  Patient Goals: rest  Family Goals: KELLY    Progress made toward(s) clinical / shift goals:        Problem: Knowledge Deficit - Standard  Goal: Patient and family/care givers will demonstrate understanding of plan of care, disease process/condition, diagnostic tests and medications  Outcome: Progressing     Problem: Hemodynamics  Goal: Patient's hemodynamics, fluid balance and neurologic status will be stable or improve  Outcome: Progressing     Problem: Fluid Volume  Goal: Fluid volume balance will be maintained  Outcome: Progressing     Problem: Respiratory  Goal: Patient will achieve/maintain optimum respiratory ventilation and gas exchange  Outcome: Progressing     Problem: Pain - Standard  Goal: Alleviation of pain or a reduction in pain to the patient’s comfort goal  Outcome: Progressing  Note: Pt reports pain well controlled with current therapy. Additional non-pharmacologic interventions implemented. Education on pain reduction goals, pain rating scale, and potential side effects of pharmacologic interventions. Demonstrates use of appropriate diversional activities and/or relaxation techniques.     Problem: Fall Risk  Goal: Patient will remain free from falls  Outcome: Progressing

## 2025-06-28 NOTE — DISCHARGE SUMMARY
Discharge Summary    CHIEF COMPLAINT ON ADMISSION  Chief Complaint   Patient presents with    Syncope     Pt had a witnessed syncopal event at an MD appt two weeks ago. Pt has been feeling intermittently dizzy and weak. - chest pain, + dyspnea with exertion       Reason for Admission  Symptomatic bradycardia     Admission Date  6/26/2025    CODE STATUS  Full Code    HPI & HOSPITAL COURSE      Please see original H&P and consult notes for specific information      Ta Chris Stokes is a 60 y.o. male with history of chronic A-fib on amiodarone/bisoprolol/Eliquis, hypertension, hyperlipidemia, diabetes, prostate cancer who presented 6/26/2025 with evaluation for bradycardia, syncopal episode.  Patient has been taking amiodarone and bisoprolol for atrial fibrillation.  Earlier today, he was seen at PCP office, noted to have been bradycardic and had a syncopal episode.  Patient received atropine 0.5 mg by EMS.  In ER, HR in 30s to 40s, atrial fibrillation.  Cardiology was consulted, formal evaluation to follow.  Admitted to medicine service for further evaluation and treatment.     6/27: Patient is in bed, patient is alert oriented follows commands, denies any dizziness or lightheadedness, no focal weakness no numbness or tingling, continue telemetry monitoring, heart rates in the mid, blood pressure is stable, discussed with cardiology we will continue monitoring continue holding beta-blockers and amiodarone, patient will require at least another 24 to 48 hours to see if heart rate is improving otherwise might require pacemaker placement       Patient is resting in bed, he is alert oriented follows commands, still having mild bradycardia but he is asymptomatic now he has been able to ambulate without any dizziness lightheadedness shortness of breath chest pain palpitations, patient was reevaluated by cardiology today and has been cleared for discharge, patient had event monitoring placed before discharge, patient again  is tolerating diet he denies any new complaints, patient at this time has been cleared for discharge by cardiology and recommended to follow-up as outpatient, patient will be discharging home today in a stable condition, patient is back to baseline oxygen requirements, he has expressed understanding of his plan of care and agree with it all questions been answered.        Therefore, he is discharged in good and stable condition to home with close outpatient follow-up.    The patient met 2-midnight criteria for an inpatient stay at the time of discharge.    Discharge Date  6/28/25    FOLLOW UP ITEMS POST DISCHARGE  Primary care physician  Cardiology    DISCHARGE DIAGNOSES  Principal Problem (Resolved):    Symptomatic bradycardia (POA: Yes)  Active Problems:    Essential hypertension (POA: Yes)    Dyslipidemia (Chronic) (POA: Yes)    Carcinoma of prostate (HCC) (Chronic) (POA: Yes)      Overview: Biopsy-proven 1/17/2024      First course of degarelix 2/26/2024    Class 2 obesity due to excess calories in adult (POA: Unknown)    Diabetes (HCC) (POA: Unknown)  Resolved Problems:    Syncope and collapse (POA: Unknown)      FOLLOW UP  Future Appointments   Date Time Provider Department Center   7/1/2025  4:20 PM Mark Whitley D.O. [facility] None   7/11/2025 10:45 AM JESUS Aragon None   9/8/2025  3:00 PM CASSIE Siu None   10/23/2025  1:20 PM Natalya Ortega M.D. [facility]   11/21/2025  3:15 PM CASSIE Gonzalez None     Natalya Ortega M.D.  [address] [place] [id number]  [phone]    Follow up in 1 week(s)        MEDICATIONS ON DISCHARGE     Medication List        CONTINUE taking these medications        Instructions   atorvastatin 10 MG Tabs  Commonly known as: Lipitor   Take 1 Tablet by mouth every evening.  Dose: 10 mg     CALCIUM + D3 PO   Take 1 Tablet by mouth every morning. (OTC)  Dose: 1 Tablet     Eliquis 5 MG Tabs  Generic drug:  apixaban   Take 1 tablet by mouth twice daily  Dose: 5 mg     gabapentin 100 MG Caps  Commonly known as: Neurontin   Take 100 mg by mouth at bedtime.  Dose: 100 mg     Jardiance 10 MG Tabs tablet  Generic drug: Empagliflozin   Doctor's comments: Patient has savings card. Please use if applicable.  Take 1 Tablet by mouth every day.  Dose: 10 mg     tamsulosin 0.4 MG capsule  Commonly known as: Flomax   Take 0.4 mg by mouth at bedtime.  Dose: 0.4 mg     Trelegy Ellipta 200-62.5-25 MCG/ACT inhaler  Generic drug: fluticasone-umeclidinium-vilanterol   Inhale 1 Puff every day.  Dose: 1 Puff     VITAMIN D3 PO   Take 2 Tablets by mouth every morning. (OTC)  Dose: 2 Tablet            STOP taking these medications      amiodarone 200 MG Tabs  Commonly known as: Cordarone     bisoprolol 5 MG Tabs  Commonly known as: Zebeta     diclofenac DR 75 MG Tbec  Commonly known as: Voltaren     lisinopril 5 MG Tabs  Commonly known as: Prinivil              Allergies  Allergies[1]    DIET  Orders Placed This Encounter   Procedures    Diet Order Diet: Cardiac     Standing Status:   Standing     Number of Occurrences:   1     Diet::   Cardiac [6]       ACTIVITY  As tolerated.  Weight bearing as tolerated    CONSULTATIONS  Cardiology    PROCEDURES  None    LABORATORY  Lab Results   Component Value Date    SODIUM 138 06/28/2025    POTASSIUM 4.5 06/28/2025    CHLORIDE 107 06/28/2025    CO2 20 06/28/2025    GLUCOSE 110 (H) 06/28/2025    BUN 28 (H) 06/28/2025    CREATININE 0.96 06/28/2025        Lab Results   Component Value Date    WBC 4.9 06/27/2025    HEMOGLOBIN 14.5 06/27/2025    HEMATOCRIT 44.9 06/27/2025    PLATELETCT 146 (L) 06/27/2025        Total time of the discharge process exceeds 32 minutes.       [1]   Allergies  Allergen Reactions    Metformin Diarrhea

## 2025-06-28 NOTE — PROGRESS NOTES
Cardiology Follow Up Progress Note    Date of Service  6/28/2025    Attending Physician  RAMA Mcnally.*    Chief Complaint   Dizziness and weakness    HPI  Dieudonne Stokes is a 60 y.o. male with a history of paroxysmal atrial fibrillation on Eliquis, hypertension and diabetes mellitus type 2 admitted 6/26/2025 with symptomatic bradycardia.    Interim Events  6/27/2025: No cardiac events overnight.  Sinus bradycardia on telemetry.  Vital signs stable.  SpO2 96% on 2 L nasal cannula.  He reports he is feeling well today, but he notes that he has not ambulated.  He does not have any current cardiac symptoms. No chest pain or palpitations. No shortness of breath, dyspnea on exertion, orthopnea or PND. No lower extremity edema. No dizziness or lightheadedness. No syncope or presyncope.    6/28/2025: No overnight cardiac events. Tele monitoring personally interpreted by me shows SB. VSS; RA.    Disposition: Life cardiac event monitor placed.  Patient may discharge from cardiac standpoint.  Patient to continue to hold bisoprolol and amiodarone.  Discussed with Dr. Phillip    Review of Systems  Review of Systems   Constitutional:  Negative for chills, diaphoresis, fatigue and fever.   HENT: Negative.     Eyes: Negative.    Respiratory:  Negative for cough, chest tightness and shortness of breath.    Cardiovascular:  Negative for chest pain, palpitations and leg swelling.   Gastrointestinal:  Negative for abdominal distention, abdominal pain, constipation, diarrhea, nausea and vomiting.   Endocrine: Negative.    Genitourinary:  Negative for decreased urine volume, difficulty urinating, dysuria, frequency and urgency.   Musculoskeletal:  Negative for arthralgias and myalgias.   Skin:  Negative for color change.   Neurological:  Negative for dizziness, syncope and light-headedness.   Hematological:  Does not bruise/bleed easily.   Psychiatric/Behavioral: Negative.         Vital signs in last 24 hours  Temp:   [36.2 °C (97.2 °F)-36.6 °C (97.9 °F)] 36.5 °C (97.7 °F)  Pulse:  [40-46] 40  Resp:  [16-18] 18  BP: (109-146)/(62-74) 132/66  SpO2:  [95 %-96 %] 95 %    Physical Exam  Physical Exam  Vitals and nursing note reviewed.   Constitutional:       General: He is not in acute distress.     Appearance: Normal appearance. He is not toxic-appearing.   HENT:      Head: Normocephalic and atraumatic.      Right Ear: External ear normal.      Left Ear: External ear normal.      Nose: Nose normal.      Mouth/Throat:      Mouth: Mucous membranes are moist.      Pharynx: Oropharynx is clear.   Eyes:      General: No scleral icterus.     Extraocular Movements: Extraocular movements intact.      Conjunctiva/sclera: Conjunctivae normal.      Pupils: Pupils are equal, round, and reactive to light.   Neck:      Vascular: No JVD.   Cardiovascular:      Rate and Rhythm: Regular rhythm. Bradycardia present.      Pulses: Normal pulses.      Heart sounds: Normal heart sounds. No murmur heard.     No friction rub. No gallop.   Pulmonary:      Effort: Pulmonary effort is normal.      Breath sounds: Normal breath sounds.   Abdominal:      General: Abdomen is flat. Bowel sounds are normal. There is no distension.      Palpations: Abdomen is soft.      Tenderness: There is no abdominal tenderness.   Musculoskeletal:         General: Normal range of motion.      Cervical back: Normal range of motion and neck supple.      Right lower leg: No edema.      Left lower leg: No edema.   Skin:     General: Skin is warm and dry.      Capillary Refill: Capillary refill takes less than 2 seconds.      Coloration: Skin is not jaundiced.   Neurological:      General: No focal deficit present.      Mental Status: He is alert and oriented to person, place, and time. Mental status is at baseline.   Psychiatric:         Mood and Affect: Mood normal.         Behavior: Behavior normal.         Judgment: Judgment normal.         Lab Review  Lab Results   Component  Value Date/Time    WBC 4.9 06/27/2025 07:52 AM    RBC 4.73 06/27/2025 07:52 AM    HEMOGLOBIN 14.5 06/27/2025 07:52 AM    HEMATOCRIT 44.9 06/27/2025 07:52 AM    MCV 94.9 06/27/2025 07:52 AM    MCH 30.7 06/27/2025 07:52 AM    MCHC 32.3 06/27/2025 07:52 AM    MPV 9.4 06/27/2025 07:52 AM      Lab Results   Component Value Date/Time    SODIUM 138 06/28/2025 04:49 AM    POTASSIUM 4.5 06/28/2025 04:49 AM    CHLORIDE 107 06/28/2025 04:49 AM    CO2 20 06/28/2025 04:49 AM    GLUCOSE 110 (H) 06/28/2025 04:49 AM    BUN 28 (H) 06/28/2025 04:49 AM    CREATININE 0.96 06/28/2025 04:49 AM      Lab Results   Component Value Date/Time    ASTSGOT 28 06/27/2025 07:52 AM    ALTSGPT 48 06/27/2025 07:52 AM     Lab Results   Component Value Date/Time    CHOLSTRLTOT 153 08/10/2024 07:20 AM    LDL 73 08/10/2024 07:20 AM    HDL 50 08/10/2024 07:20 AM    TRIGLYCERIDE 152 (H) 08/10/2024 07:20 AM    TROPONINT 8 06/27/2025 07:52 AM       Recent Labs     06/26/25  1200   NTPROBNP 277*       Cardiac Imaging and Procedures Review    Echocardiogram: Pending formal interpretation    Imaging  Chest X-Ray:  6/26/2025   FINDINGS:  Heart size is moderately enlarged  No pulmonary infiltrates or consolidations are noted.  No pleural abnormalities are noted.     IMPRESSION:     No acute cardiac or pulmonary abnormalities are identified.      Assessment/Plan  #Symptomatic bradycardia  #Junctional rhythm  #Paroxysmal atrial fibrillation  #Hypertension  #Diabetes mellitus type 2    Recommendations:  - No further symptoms, even after ambulation.  -Continued bradycardia on telemetry.  -Bradycardia likely due to concurrent use of amiodarone and beta-blockade.  -Continue to hold bisoprolol/amiodarone and allow for beta-blocker/amiodarone washout.  -Continue Eliquis 5 mg twice daily.  -Continue atorvastatin 10 mg daily.    -Echocardiogram is currently pending.  - Live cardiac event monitor placed.  Patient to follow-up in 2-week with cardiology clinic.    Cardiology  will sign off.  Discussed with Dr. Phillip    Future Appointments   Date Time Provider Department Center   7/1/2025  4:20 PM Mark Whitley D.O. [facility] None   7/11/2025 10:45 AM JESUS Aragon [facility] None   9/8/2025  3:00 PM Stoney Cochran M.D. [facility] None   10/23/2025  1:20 PM Natalya Ortega M.D. [facility]   11/21/2025  3:15 PM Tyler Brower M.D. [facility] None       Please contact me with any questions.    Thank you for allowing me to participate in the care of Ta Chris Stokes .    Please see Dr. Landry's attestation for further details and MDM.     EBONIE Donato, CCK, HF-CERT   [facility]  [phone]    PLEASE NOTE: This Note was created using voice recognition Software. I have made every reasonable attempt to correct obvious errors, but I expect that there are errors of grammar and possibly content that I did not discover before finalizing the note

## 2025-06-29 DIAGNOSIS — R00.1 BRADYCARDIA: Primary | ICD-10-CM

## 2025-06-30 ENCOUNTER — NON-PROVIDER VISIT (OUTPATIENT)
Dept: CARDIOLOGY | Facility: MEDICAL CENTER | Age: 60
End: 2025-06-30
Attending: INTERNAL MEDICINE
Payer: MEDICAID

## 2025-06-30 DIAGNOSIS — R00.1 BRADYCARDIA: ICD-10-CM

## 2025-06-30 PROCEDURE — 93270 REMOTE 30 DAY ECG REV/REPORT: CPT

## 2025-06-30 NOTE — PROGRESS NOTES
Patient enrolled in the 14 day [employer] AT Live heart monitor per JAYDA Moya.  In clinic hook up, monitor s/n [id number]  Baseline Transmitted  Pending EOS

## 2025-07-01 ENCOUNTER — APPOINTMENT (OUTPATIENT)
Dept: PHYSICAL MEDICINE AND REHAB | Facility: MEDICAL CENTER | Age: 60
End: 2025-07-01
Payer: MEDICAID

## 2025-07-01 VITALS
HEIGHT: 67 IN | OXYGEN SATURATION: 94 % | SYSTOLIC BLOOD PRESSURE: 135 MMHG | WEIGHT: 236 LBS | TEMPERATURE: 97.6 F | HEART RATE: 67 BPM | DIASTOLIC BLOOD PRESSURE: 74 MMHG | BODY MASS INDEX: 37.04 KG/M2

## 2025-07-01 DIAGNOSIS — G56.03 BILATERAL CARPAL TUNNEL SYNDROME: ICD-10-CM

## 2025-07-01 DIAGNOSIS — G89.29 CHRONIC NECK PAIN: ICD-10-CM

## 2025-07-01 DIAGNOSIS — M47.812 SPONDYLOSIS OF CERVICAL SPINE: ICD-10-CM

## 2025-07-01 DIAGNOSIS — M48.02 FORAMINAL STENOSIS OF CERVICAL REGION: ICD-10-CM

## 2025-07-01 DIAGNOSIS — M47.816 SPONDYLOSIS OF LUMBAR SPINE: Primary | ICD-10-CM

## 2025-07-01 DIAGNOSIS — M47.22 CERVICAL RADICULOPATHY DUE TO DEGENERATIVE JOINT DISEASE OF SPINE: ICD-10-CM

## 2025-07-01 DIAGNOSIS — M54.2 CHRONIC NECK PAIN: ICD-10-CM

## 2025-07-01 PROCEDURE — 3075F SYST BP GE 130 - 139MM HG: CPT | Performed by: STUDENT IN AN ORGANIZED HEALTH CARE EDUCATION/TRAINING PROGRAM

## 2025-07-01 PROCEDURE — 99213 OFFICE O/P EST LOW 20 MIN: CPT | Performed by: STUDENT IN AN ORGANIZED HEALTH CARE EDUCATION/TRAINING PROGRAM

## 2025-07-01 PROCEDURE — 3078F DIAST BP <80 MM HG: CPT | Performed by: STUDENT IN AN ORGANIZED HEALTH CARE EDUCATION/TRAINING PROGRAM

## 2025-07-01 ASSESSMENT — FIBROSIS 4 INDEX: FIB4 SCORE: 1.63

## 2025-07-01 ASSESSMENT — PAIN SCALES - GENERAL: PAINLEVEL_OUTOF10: 6=MODERATE PAIN

## 2025-07-01 NOTE — PROGRESS NOTES
[facility] (Physical Medicine and Rehabilitation)  Sports Medicine& Interventional Spine   Follow Up Patient Visit      Chief complaint:   Chief Complaint   Patient presents with    Follow-Up     SP          HISTORY        HPI  Patient identification: Ta Stokes ,  1965,   With The primary encounter diagnosis was Spondylosis of lumbar spine. Diagnoses of Spondylosis of cervical spine, Foraminal stenosis of cervical region, Bilateral carpal tunnel syndrome, Cervical radiculopathy due to degenerative joint disease of spine, and Chronic neck pain were also pertinent to this visit.       At last visit dated 5/14/15  1. Chronic low back pain.  The patient reports chronic low back pain with a severity of 5-6 out of 10, which has worsened since the last visit. MRI of the lumbar spine shows arthritis in the lower segments and atrophy of the paraspinal muscles. Physical therapy is recommended to strengthen these muscles. Injections into the low back to numb the nerves of the joints are suggested to alleviate pain. If medial branch block injections provide short-term relief, an ablation procedure may be considered for long-term relief.      The patient is failed conservative treatments with medication management, home exercise program from the direction of physical therapy/physician including the past 8 weeks .  I have ordered a BILATERAL diagnostic medial branch block targeting the L3-4, L4-5, and L5-S1 facet joints #1 and #2.  Procedure #2 is only to be done if #1 is a positive block.     The risks benefits and alternatives to this procedure were discussed and the patient wishes to proceed with the procedure. Risks include but are not limited to damage to surrounding structures, infection, bleeding, worsening of pain which can be permanent, weakness which can be permanent. Benefits include pain relief, improved function. Alternatives includes not doing the procedure.   If there are 2 positive blocks I  would consider the patient for radiofrequency neurotomy of the previously blocked nerves.        2. Bilateral carpal tunnel syndrome.  The patient has moderate carpal tunnel syndrome on the right and mild on the left. He reports that his current wrist splints are no longer effective, and he experiences numbness and pain at night. Continued use of bracing is recommended, and new braces are suggested if the current ones are not helping. Potential carpal tunnel injections will be considered at follow-up.    Previous Workup:  EMG Examination Date: 2/3/2025      Impression:       This is abnormal study  There is electrodiagnostic evidence for an acute and chronic left C5-C6 radiculopathy  There is electrodiagnostic evidence for bilateral median mononeuropathy at the wrist, i.e. carpal tunnel syndrome, that is moderate on the right and mild on the left.  There is potential concern for a left ulnar mononeuropathy at the elbow i.e. cubital tunnel syndrome however this may have been technical error as there were bilateral below elbow stimulations showed minimal response.  Can consider a repeat EMG in 3 months to further evaluate this finding      Procedure history:  4/16/25 MBB targeting bilateral L3-4 L4-5, L5-S1 facet joints  4/29/25 MBB targeting bilateral L3-4 L4-5, L5-S1 facet joints  5/20/25 RFA targeting bilateral L3-4 L4-5, L5-S1 facet joints  Interval History:  History of Present Illness  The patient is a 60-year-old male who presents today for repeat evaluation of back pain throughout multiple locations, predominantly at the low back with radiation to bilateral lower extremities. He notes numbness and aching, burning pain throughout both calves. He states that his pain is somewhat better from his previous evaluation. He recently did undergo right and left radiofrequency neurotomies targeting bilateral L3-L4, L4-L5, and L5-S1 facet joints. Of note, he was recently hospitalized for symptomatic bradycardia and will  follow with Cardiology on an outpatient basis.    He reports that his back was bruised and tender for 2 weeks following the procedure, which he expected. His condition improved for about 3 to 4 weeks, but then the pain returned in a specific spot. He also experiences severe pain when walking more than 50 feet, particularly in a strip by the calf, occasionally extending to the right but mostly affecting the left side. He thought it was due to his shoes and got new ones, but the pain persisted. He is unsure if the pain is related to his back or something else. He wonders if his back pain is causing him to walk differently, leading to stress on other areas. He has been wearing braces every night, which has significantly reduced the numbness in his hands during sleep.    He was recently hospitalized for symptomatic bradycardia and will follow up with Cardiology on an outpatient basis. He was on oxygen before the heart issue. He was put on oxygen over specialty care about 2 to 3 weeks ago. This past week, he had to see his primary care physician for a follow-up. His heart rate was measured in the 130s, so he was told he could not leave and an ambulance was called. He was taken to the emergency room and admitted to the hospital. After getting into bed and relaxing, one of the doctors came in to explain what had happened. Suddenly, about 20 people rushed into the room with machines because his heart rate had dropped to 17. He was kept in the hospital for 3 days and then released. He now has to wear a heart monitor for up to 2 weeks. When he is relaxed, his heart rate drops to the high 20s to 30s, but it spikes over 100 when he starts doing things. They are considering a pacemaker. He is currently on 2 liters of oxygen via nasal cannula. He was advised to discontinue four medications immediately, one of which he had been taking twice daily for over a year, even though it was intended to be reduced to once daily after a  week.            PMHx:   Past Medical History:   Diagnosis Date    Arrhythmia     A-fib    Cancer (HCC) 01/2024    prostate cancer    Dental disorder     upper/lower dentures    Dyslipidemia     GERD (gastroesophageal reflux disease)     Heart burn     High cholesterol     Hypertension     Indigestion     Sleep apnea     Snoring        PSHx:   Past Surgical History:   Procedure Laterality Date    RADIO FREQUENCY ABLATION ADDITIONAL LEVEL Bilateral 5/20/2025    Procedure: RIGHT and LEFT radiofrequency neurotomies medial branch targeting the L3-4, L4-5, and L5-S1 facet joints with fluoroscopic guidance and sedation…..Note: plan for sedation with 0.5mg versed and 25 mcg fentanyl. Plan for 80 °C for 90 seconds for each neurotomy;  Surgeon: Satya Ponce M.D.;  [facility];  Service: Pain Management    LUMBAR MEDIAL BRANCH BLOCKS Bilateral 4/29/2025    Procedure: Diagnostic medial branch blocks targeting the BILATERAL L3-4, L4-5, and L5-S1 facet joints with fluoroscopic guidance #2…Note: Diagnostic medial branch block #2 is only be done if procedure #1 is a positive block.  This will be on a separate date from procedure #1.;  Surgeon: Satya Ponce M.D.;  Location: [facility];  Service: Pain Management    LUMBAR MEDIAL BRANCH BLOCKS Bilateral 4/16/2025    Procedure: Diagnostic medial branch blocks targeting the BILATERAL L3-4, L4-5, and L5-S1 facet joints with fluoroscopic guidance;  Surgeon: Satya Ponce M.D.;  Location: [facility];  Service: Pain Management    EMG/NCS  2/3/2025       Family history   Family History   Problem Relation Age of Onset    Hypertension Mother     Cancer Maternal Grandmother          Medications:   Outpatient Medications Marked as Taking for the 7/1/25 encounter (Office Visit) with Mark Whitley D.O.   Medication Sig Dispense Refill    Calcium Carb-Cholecalciferol (CALCIUM + D3 PO) Take 1 Tablet by mouth every morning.  (OTC)      Cholecalciferol (VITAMIN D3 PO) Take 2 Tablets by mouth every morning. (OTC)      TRELEGY ELLIPTA 200-62.5-25 MCG/ACT inhaler Inhale 1 Puff every day.      gabapentin (NEURONTIN) 100 MG Cap Take 100 mg by mouth at bedtime.      ELIQUIS 5 MG Tab Take 1 tablet by mouth twice daily 180 Tablet 3    Empagliflozin (JARDIANCE) 10 MG Tab tablet Take 1 Tablet by mouth every day. 90 Tablet 3    atorvastatin (LIPITOR) 10 MG Tab Take 1 Tablet by mouth every evening. 90 Tablet 3    tamsulosin (FLOMAX) 0.4 MG capsule Take 0.4 mg by mouth at bedtime.          Current Outpatient Medications on File Prior to Visit   Medication Sig Dispense Refill    Calcium Carb-Cholecalciferol (CALCIUM + D3 PO) Take 1 Tablet by mouth every morning. (OTC)      Cholecalciferol (VITAMIN D3 PO) Take 2 Tablets by mouth every morning. (OTC)      TRELEGY ELLIPTA 200-62.5-25 MCG/ACT inhaler Inhale 1 Puff every day.      gabapentin (NEURONTIN) 100 MG Cap Take 100 mg by mouth at bedtime.      ELIQUIS 5 MG Tab Take 1 tablet by mouth twice daily 180 Tablet 3    Empagliflozin (JARDIANCE) 10 MG Tab tablet Take 1 Tablet by mouth every day. 90 Tablet 3    atorvastatin (LIPITOR) 10 MG Tab Take 1 Tablet by mouth every evening. 90 Tablet 3    tamsulosin (FLOMAX) 0.4 MG capsule Take 0.4 mg by mouth at bedtime.       No current facility-administered medications on file prior to visit.         Allergies:   Allergies   Allergen Reactions    Metformin Diarrhea             Social Hx:   Social History     Socioeconomic History    Marital status: Single     Spouse name: Not on file    Number of children: Not on file    Years of education: Not on file    Highest education level: Associate degree: occupational, technical, or vocational program   Occupational History    Not on file   Tobacco Use    Smoking status: Former     Current packs/day: 0.00     Average packs/day: 1 pack/day for 39.0 years (39.0 ttl pk-yrs)     Types: Cigarettes     Start date: 1/1/1979      Quit date: 2018     Years since quittin.5    Smokeless tobacco: Never    Tobacco comments:     1ppd   Vaping Use    Vaping status: Never Used   Substance and Sexual Activity    Alcohol use: Not Currently     Comment: once in awhile, 1 beer    Drug use: No    Sexual activity: Not Currently     Partners: Female   Other Topics Concern    Not on file   Social History Narrative    Not on file     Social Drivers of Health     Financial Resource Strain: High Risk (3/22/2023)    Overall Financial Resource Strain (CARDIA)     Difficulty of Paying Living Expenses: Hard   Food Insecurity: No Food Insecurity (2025)    Hunger Vital Sign     Worried About Running Out of Food in the Last Year: Never true     Ran Out of Food in the Last Year: Never true   Transportation Needs: No Transportation Needs (2025)    PRAPARE - Transportation     Lack of Transportation (Medical): No     Lack of Transportation (Non-Medical): No   Physical Activity: Sufficiently Active (3/22/2023)    Exercise Vital Sign     Days of Exercise per Week: 7 days     Minutes of Exercise per Session: 150+ min   Stress: Stress Concern Present (3/22/2023)    [employer] - Occupational Stress Questionnaire     Feeling of Stress : To some extent   Social Connections: Moderately Isolated (3/22/2023)    Social Connection and Isolation Panel [NHANES]     Frequency of Communication with Friends and Family: More than three times a week     Frequency of Social Gatherings with Friends and Family: More than three times a week     Attends Hinduism Services: Never     Active Member of Clubs or Organizations: No     Attends Club or Organization Meetings: Never     Marital Status: Living with partner   Intimate Partner Violence: Not At Risk (2025)    Humiliation, Afraid, Rape, and Kick questionnaire     Fear of Current or Ex-Partner: No     Emotionally Abused: No     Physically Abused: No     Sexually Abused: No   Housing  "Stability: Low Risk  (6/26/2025)    Housing Stability Vital Sign     Unable to Pay for Housing in the Last Year: No     Number of Times Moved in the Last Year: 0     Homeless in the Last Year: No         EXAMINATION     Physical Exam:   Vitals: /74   Pulse 67   Temp 36.4 °C (97.6 °F) (Temporal)   Ht 1.702 m (5' 7\")   Wt 107 kg (236 lb)   SpO2 94%   PF (!) 2 L/min     Constitutional:   Body Habitus: Body mass index is 36.96 kg/m².  Cooperation: Fully cooperates with exam  Appearance: Well-groomed no disheveled    Respiratory-  breathing comfortable on room air, no audible wheezing  Cardiovascular- capillary refills less than 2 seconds. No lower extremity edema is noted.   Psychiatric- alert and oriented ×3. Normal affect.    MSK and Neuro:   On examination patient demonstrates decreased range of motion in lumbar flexion and extension, with significant pain during bilateral facet loading.  Strength testing reveals mild weakness in hip flexion, knee extension, ankle dorsiflexion, and ED LH extension on the right side.  He has 5 out of 5 strength in all musculature on the left side.    Inspection: No evidence of atrophy in bilateral lower extremities throughout   ROM: decreased AROM with flexion, extension, lateral flexion, and rotation bilaterally, with pain   Palpation:   No tenderness to palpation in midline at T1-T12 levels. No tenderness to palpation in the left and right of the midline T1-L5  palpation over SI joint: negative bilaterally    palpation over buttock: negative bilaterally    palpation in hip or over the greater trochanters: negative bilaterally      Lumbar spine Special tests  Neuro tension  Straight leg test positive right, negative left    Slump test positive right, negative left      HIP  FAIR test negative bilaterally    Range of motion in the hips is within normal limits in flexion, extension, abduction, internal rotation, external rotation.    SI joint tests  Observation patient sits " on one buttocks: Negative  SI joint compression negative bilaterally    SI joint distraction negative bilaterally    Thigh thrust test negative bilaterally    BRAYAN test negative bilaterally     Neuro   Key points for the international standards for neurological classification of spinal cord injury (ISNCSCI) to light touch.   Dermatome R L   L2 2 2   L3 2 2   L4 2 2   L5 2 2   S1 2 2   S2 2 2     Motor Exam Lower Extremities  ? Myotome R L   Hip flexion L2 4 5   Knee extension L3 4 5   Ankle dorsiflexion L4 4 5   Toe extension L5 5 5   Ankle plantarflexion S1 5 5       Babinski sign negative bilaterally   Clonus of the ankle negative bilaterally     Reflexes  ?  R L   Patella  2+ 2+   Achilles   2+ 2+           MEDICAL DECISION MAKING    DATA    Labs:   Lab Results   Component Value Date/Time    SODIUM 138 06/28/2025 04:49 AM    POTASSIUM 4.5 06/28/2025 04:49 AM    CHLORIDE 107 06/28/2025 04:49 AM    CO2 20 06/28/2025 04:49 AM    GLUCOSE 110 (H) 06/28/2025 04:49 AM    BUN 28 (H) 06/28/2025 04:49 AM    CREATININE 0.96 06/28/2025 04:49 AM        Lab Results   Component Value Date/Time    PROTHROMBTM 15.9 (H) 06/26/2025 12:00 PM    INR 1.27 (H) 06/26/2025 12:00 PM        Lab Results   Component Value Date/Time    WBC 4.9 06/27/2025 07:52 AM    RBC 4.73 06/27/2025 07:52 AM    HEMOGLOBIN 14.5 06/27/2025 07:52 AM    HEMATOCRIT 44.9 06/27/2025 07:52 AM    MCV 94.9 06/27/2025 07:52 AM    MCH 30.7 06/27/2025 07:52 AM    MCHC 32.3 06/27/2025 07:52 AM    MPV 9.4 06/27/2025 07:52 AM    NEUTSPOLYS 76.20 (H) 06/27/2025 07:52 AM    LYMPHOCYTES 12.20 (L) 06/27/2025 07:52 AM    MONOCYTES 6.50 06/27/2025 07:52 AM    EOSINOPHILS 3.50 06/27/2025 07:52 AM    BASOPHILS 0.60 06/27/2025 07:52 AM        Lab Results   Component Value Date/Time    HBA1C 6.6 (A) 01/22/2025 02:56 PM          Imaging:   I personally reviewed following images    Results  Imaging  MRI of the lumbar spine shows mild narrowing of the thecal sac at L3, L4, L4-L5  secondary to epidural lipomatosis. And diffuse lumbar facet arthropathy       MRI of the cervical spine dated 12/16/2024 showed severe right foraminal narrowing at C4-C5 and bilateral foraminal narrowing at C5-C6.         I reviewed the following radiology reports                   Results for orders placed during the hospital encounter of 01/14/25    MR-CERVICAL SPINE-W/O    Impression  Stable appearance of moderate canal narrowing at C4-5 and C5-6.    Stable appearance of severe right foraminal narrowing at C4-5.    Stable appearance of bilateral moderate foraminal narrowing at C5-6.    Results for orders placed during the hospital encounter of 12/16/24    MR-CERVICAL SPINE-WITH & W/O    Impression  1.  Moderate canal narrowing at C4-5 and C5-6.    2.  Severe right foraminal narrowing at C4-5.    3.  Moderate bilateral foraminal narrowing at C5-6.                                                                               Results for orders placed during the hospital encounter of 02/01/19    CT-ABDOMEN-PELVIS WITH    Impression  1.  Asymmetric thickening of the right lower anterior intercostal muscles with a small amount of fluid seen extending between the deep and superficial layers of the right lower chest and anterior lateral abdominal wall muscles likely representing  hematoma. A definite associated rib fracture is not seen.    2.  Fatty liver.    3.  Small left lobe hepatic cyst or hemangioma.    4.  No evidence of bowel obstruction or focal inflammatory change within the abdomen or pelvis.                     Results for orders placed in visit on 01/21/25    DX-CERVICAL SPINE-4+ VIEWS                                   Results for orders placed during the hospital encounter of 01/14/25    DX-SHOULDER 2+ LEFT    Impression  No acute osseous abnormality.              DIAGNOSIS   Visit Diagnoses     ICD-10-CM   1. Spondylosis of lumbar spine  M47.816   2. Spondylosis of cervical spine  M47.812   3. Foraminal  stenosis of cervical region  M48.02   4. Bilateral carpal tunnel syndrome  G56.03   5. Cervical radiculopathy due to degenerative joint disease of spine  M47.22   6. Chronic neck pain  M54.2    G89.29                   ASSESSMENT and PLAN:       Assessment & Plan            PROCEDURE  The patient previously underwent bilateral medial branch blocks targeting L3-L4, L4-L5, and L5-S1 facet joints.      1. Back pain.  The patient's back pain has shown some improvement since the last evaluation. However, he reports a return of pain in a specific area and new pain in a strip by the calf, primarily on the left side. The recent radiofrequency neurotomies targeting bilateral L3-L4, L4-L5, and L5-S1 facet joints were performed just over a month ago. The possibility of repeating the procedure was discussed. It was suggested that the arthritis in that particular location might be more severe than in others, which could explain the persistent pain. The pain in the foot may be related to another anatomical area or component. He was advised to monitor his condition closely and rest as needed. If necessary, he can contact us before the next scheduled appointment.    2. Bilateral carpal tunnel syndrome.  The patient has moderate carpal tunnel syndrome on the right and mild on the left. He reports that his current wrist splints are no longer effective, and he experiences numbness and pain at night. Continued use of bracing is recommended, and new braces are suggested if the current ones are not helping. Potential carpal tunnel injections can be considered at follow-up.    3. Symptomatic bradycardia.  The patient was recently hospitalized for symptomatic bradycardia, with a heart rate dropping to 17 bpm. He is currently on 2 L of oxygen via nasal cannula and is wearing a heart monitor for up to 2 weeks. He will follow up with Cardiology on an outpatient basis. The possibility of a pacemaker is being considered.    Follow-up  The  patient will follow up in 3 months.    PROCEDURE  Right and left radiofrequency neurotomies targeting bilateral L3-L4, L4-L5, and L5-S1 facet joints were performed.        Dieudonne was seen today for follow-up.    Diagnoses and all orders for this visit:    Spondylosis of lumbar spine    Spondylosis of cervical spine    Foraminal stenosis of cervical region    Bilateral carpal tunnel syndrome    Cervical radiculopathy due to degenerative joint disease of spine    Chronic neck pain        Follow up: 3 months      Please note that this dictation was created using voice recognition software. I have made every reasonable attempt to correct obvious errors but there may be errors of grammar and content that I may have overlooked prior to finalization of this note.    Mark Whitley DO  Physical Medicine and Rehabilitation  Sports Medicine and Spine  [facility]

## 2025-07-02 ENCOUNTER — TELEPHONE (OUTPATIENT)
Dept: CARDIOLOGY | Facility: MEDICAL CENTER | Age: 60
End: 2025-07-02
Payer: MEDICAID

## 2025-07-02 ENCOUNTER — RESULTS FOLLOW-UP (OUTPATIENT)
Dept: CARDIOLOGY | Facility: MEDICAL CENTER | Age: 60
End: 2025-07-02
Payer: MEDICAID

## 2025-07-02 NOTE — TELEPHONE ENCOUNTER
Phone number called: [phone]    To JUNE: I spoke to pt, he complaints of feeling flutter in his chest specially during activity. Pt also complaints of SOB, HA and hot flushes that would last for about 10 minutes. Pt denies chest pain, dizziness, lightheadedness or fainting. Pt hasn't been checking his BP/HR but reports that he takes all medications as prescribed. Pt told ER precaution. Pt verbalized understanding.     To Lesli: pt states that he received another ZIO kit in the mail. Pt is wearing his second ZIO patch now. Please contact pt to make sure if he needs to use the third ZIO. Thank you.

## 2025-07-02 NOTE — TELEPHONE ENCOUNTER
Phone Number Called: [phone]    Call outcome: Did not leave a detailed message. Requested patient to call back.    Message: Called to discuss if any new symptoms/changes to medications. Per last OV notes, phoebe BARTLETT, currently on Eliquis per MAR.

## 2025-07-02 NOTE — TELEPHONE ENCOUNTER
----- Message from Nurse Practitioner JESUS Moya sent at 7/2/2025  9:18 AM PDT -----  Can you call patient and see how he is doing?  Event monitor alerted to persistent atrial flutter.  Patient to keep appointment next week with   ----- Message -----  From: Irving Redd Ass't  Sent: 7/2/2025   8:36 AM PDT  To: JESUS Donato

## 2025-07-02 NOTE — TELEPHONE ENCOUNTER
Urgent ZioAT report, routed to TT's RN, Jaymie on 7/2/2025.    Preliminary findings show AFL occurred continuously, ranging from  bpm (avg of 100 bpm).

## 2025-07-02 NOTE — TELEPHONE ENCOUNTER
Caller:  Justin Long    Abnormal Results    Date and time of urgent report:   7/2/25  8:20 am    Page/Strip:   N/A    Reference Number:   [id number]    Callback Number: [phone]    Thank you,   Kathy MOTTA  Route to Isabel Perales & Lesli Gill

## 2025-07-03 ENCOUNTER — OFFICE VISIT (OUTPATIENT)
Dept: MEDICAL GROUP | Facility: MEDICAL CENTER | Age: 60
End: 2025-07-03
Attending: FAMILY MEDICINE
Payer: MEDICAID

## 2025-07-03 DIAGNOSIS — R00.1 SYMPTOMATIC BRADYCARDIA: ICD-10-CM

## 2025-07-03 DIAGNOSIS — M54.50 CHRONIC BILATERAL LOW BACK PAIN, UNSPECIFIED WHETHER SCIATICA PRESENT: ICD-10-CM

## 2025-07-03 DIAGNOSIS — G89.29 CHRONIC BILATERAL LOW BACK PAIN, UNSPECIFIED WHETHER SCIATICA PRESENT: ICD-10-CM

## 2025-07-03 DIAGNOSIS — Z09 HOSPITAL DISCHARGE FOLLOW-UP: Primary | ICD-10-CM

## 2025-07-03 DIAGNOSIS — J96.11 CHRONIC RESPIRATORY FAILURE WITH HYPOXIA (HCC): ICD-10-CM

## 2025-07-03 DIAGNOSIS — J42 CHRONIC BRONCHITIS, UNSPECIFIED CHRONIC BRONCHITIS TYPE (HCC): ICD-10-CM

## 2025-07-03 DIAGNOSIS — I48.92 ATRIAL FLUTTER, UNSPECIFIED TYPE (HCC): ICD-10-CM

## 2025-07-03 PROCEDURE — 99214 OFFICE O/P EST MOD 30 MIN: CPT | Performed by: FAMILY MEDICINE

## 2025-07-03 ASSESSMENT — FIBROSIS 4 INDEX: FIB4 SCORE: 1.63

## 2025-07-03 NOTE — PROGRESS NOTES
Verbal consent was acquired by the patient to use [employer] ambient listening note generation during this visit.    Subjective   Chief Complaint   Patient presents with    [facility] Hospital Follow-up        HPI:   Ta presents today with    History of Present Illness  The patient presents for a hospital follow-up. He is accompanied by his significant other Jacklyn.    Low Heart Rates and Associated Symptoms  He was admitted to the hospital due to extremely low heart rates, which were attributed to his medication regimen. He had been taking amiodarone twice daily since its initial prescription, unaware that the dosage should have been reduced after 10 days. His pharmacist had informed him in 02/2025 that none of his medications would interfere with each other. During his hospital stay from 06/26/2025 to 06/28/2025, his heart rate dropped to 17, prompting immediate medical attention. He has been feeling better than he did two weeks ago when he experienced dizziness and lightheadedness upon standing. He recalls an incident where he felt dizzy and nauseous while getting his hearing aids, leading to a fall and subsequent blood pressure check. He also mentions a memory lapse from that day. He has a cardiology appointment scheduled for 07/11/2025. He reports that his heart rate has fluctuated between 105 and 38. He occasionally experiences fluttering sensations in his chest.  - Onset: Hospital stay from 06/26/2025 to 06/28/2025.  - Location: Heart.  - Duration: Ongoing since hospital stay.  - Character: Extremely low heart rates, dizziness, lightheadedness, fluttering sensations in the chest.  - Alleviating/Aggravating Factors: Medication regimen (amiodarone dosage).  - Timing: Fluctuations in heart rate; dizziness and lightheadedness upon standing.  - Severity: Heart rate dropped to 17; fluctuations between 105 and 38.    Oxygen Use  He has been using oxygen for about 2 to 3 weeks, as prescribed by  Dr. Tammy Franco at [facility]. His oxygen level had previously dropped to 85 or 87 during a walk test. He uses oxygen when he is out and about, and sometimes when he is sitting at the computer for a long time. He also uses it at night with his CPAP machine.  - Onset: About 2 to 3 weeks ago.  - Duration: Ongoing.  - Character: Oxygen use for low oxygen levels.  - Timing: Uses oxygen when out and about, sitting at the computer for a long time, and at night with CPAP machine.  - Severity: Oxygen level dropped to 85 or 87 during a walk test.    Back Pain  He was prescribed gabapentin three times a day for back pain but has only been taking it once at night. He does not believe it has made a difference and is considering discontinuing it.  - Onset: Not specified.  - Duration: Ongoing.  - Character: Back pain.  - Alleviating/Aggravating Factors: Gabapentin prescribed three times a day, but only taken once at night.  - Severity: Does not believe gabapentin has made a difference.      Health Maintenance Due   Topic Date Due    Hepatitis B Vaccine (Hep B) (1 of 3 - 19+ 3-dose series) Never done    Zoster (Shingles) Vaccines (1 of 2) Never done    COVID-19 Vaccine (3 - 2024-25 season) 09/01/2024    Diabetes: Urine Protein Screening  07/13/2025    A1c Screening  07/22/2025       Objective   Social History[1]    Exam:  There were no vitals taken for this visit.    Physical Exam  Constitutional: Alert, no distress  Skin: No rashes in visible areas  Eye: Conjunctiva clear, lids normal  Respiratory: Unlabored respiratory effort on 2L NC, no cough, lungs clear to auscultation bilaterally   CV: RRR  MSK: Normal gait, moves all extremities  Psych: Alert and oriented x3, normal affect and mood    Allergies[2]    [facility] [address] [place] [address], [place] - [address] [facility]  [address] [facility] [id number]  Phone: [phone] Fax: [phone]    Current Medications[3]    Assessment & Plan    60 y.o. male with  the following -   1. Hospital discharge follow-up        2. Symptomatic bradycardia        3. Atrial flutter, unspecified type (HCC)        4. Chronic bronchitis, unspecified chronic bronchitis type (HCC)        5. Chronic respiratory failure with hypoxia (HCC)        6. Chronic bilateral low back pain, unspecified whether sciatica present          Assessment & Plan  1.  Hospital Follow up for Symptomatic bradycardia  - Admitted from 06/26/2025 to 06/28/2025 suspected due to overmedication with amiodarone and bisoprolol.  - Chart and discharge summary were reviewed. Reports feeling improved since discontinuation of above medications. HR and BP stable in office today. Denies recurrent syncopal events.  - Hospitalization and results reviewed with patient.   - Medications reviewed including instructions regarding high risk medications, dosing and side effects.  - Recommended Services: No services needed at this time  - Advance directive/POLST on file?  No   - Admitted from 06/26/2025 to 06/28/2025 suspected due to overmedication with amiodarone and bisoprolol.  - Heart rate was in the 50s during the visit.  - Contact cardiology team regarding monitor readings and discuss the possibility of a pacemaker.  - Call 911 if experiencing symptoms such as fainting or severe lightheadedness.    2. Atrial flutter: Identified during monitoring.  - Follow up with cardiologist to discuss further management, including the potential need for a pacemaker.    3. Chronic obstructive pulmonary disease (COPD) with emphysema: Chronic.  - Trelegy inhaler (1 puff in the morning) and 2 L of oxygen prescribed, particularly during exertion.  - Continue using oxygen as needed.  - Follow up with pulmonologist in 3 to 6 months.    4. Chronic respiratory failure: Confirmed by oxygen measurements.  - Continue using 2 L of oxygen, especially during exertion, and use it continuously throughout the day and night as needed. Follow up with pulmonology due  in August.    5. Back pain: Chronic.  - Gabapentin 100 mg at night reported as ineffective.  - Taper off gabapentin by taking one every other night for the next two weeks to avoid withdrawal side effects.    6. Medication management.  - Current medications include Eliquis 5 mg twice a day, Jardiance 10 mg once a day, atorvastatin 10 mg once a day, tamsulosin 0.4 mg once at night, gabapentin 100 mg once at night, vitamin D tablets (2 tablets), calcium, and Trelegy inhaler (1 puff in the morning).  - Continue medications as prescribed.    7. Advanced directive.  - Information provided through [facility] for review and discussion during future visits.    Follow-up  - Scheduled for routine follow-up in 2025.      No follow-ups on file.    Please be advised that this document was generated using voice recognition software. While I have made reasonable efforts to correct any obvious errors, there may still be grammatical or content inaccuracies that were not identified or corrected prior to finalization.              [1]   Social History  Tobacco Use    Smoking status: Former     Current packs/day: 0.00     Average packs/day: 1 pack/day for 39.0 years (39.0 ttl pk-yrs)     Types: Cigarettes     Start date: 1979     Quit date: 2018     Years since quittin.5    Smokeless tobacco: Never    Tobacco comments:     1ppd   Vaping Use    Vaping status: Never Used   Substance Use Topics    Alcohol use: Not Currently     Comment: once in awhile, 1 beer    Drug use: No   [2]   Allergies  Allergen Reactions    Metformin Diarrhea         [3]   Current Outpatient Medications   Medication Sig Dispense Refill    Calcium Carb-Cholecalciferol (CALCIUM + D3 PO) Take 1 Tablet by mouth every morning. (OTC)      Cholecalciferol (VITAMIN D3 PO) Take 2 Tablets by mouth every morning. (OTC)      TRELEGY ELLIPTA 200-62.5-25 MCG/ACT inhaler Inhale 1 Puff every day.      gabapentin (NEURONTIN) 100 MG Cap Take 100 mg by mouth at  bedtime.      ELIQUIS 5 MG Tab Take 1 tablet by mouth twice daily 180 Tablet 3    Empagliflozin (JARDIANCE) 10 MG Tab tablet Take 1 Tablet by mouth every day. 90 Tablet 3    atorvastatin (LIPITOR) 10 MG Tab Take 1 Tablet by mouth every evening. 90 Tablet 3    tamsulosin (FLOMAX) 0.4 MG capsule Take 0.4 mg by mouth at bedtime.       No current facility-administered medications for this visit.

## 2025-07-07 VITALS
DIASTOLIC BLOOD PRESSURE: 68 MMHG | WEIGHT: 249 LBS | HEART RATE: 52 BPM | SYSTOLIC BLOOD PRESSURE: 114 MMHG | TEMPERATURE: 97.8 F | HEIGHT: 67 IN | BODY MASS INDEX: 39.08 KG/M2 | RESPIRATION RATE: 16 BRPM | OXYGEN SATURATION: 96 %

## 2025-07-10 ENCOUNTER — TELEPHONE (OUTPATIENT)
Dept: CARDIOLOGY | Facility: MEDICAL CENTER | Age: 60
End: 2025-07-10
Payer: MEDICAID

## 2025-07-11 ENCOUNTER — OFFICE VISIT (OUTPATIENT)
Dept: CARDIOLOGY | Facility: MEDICAL CENTER | Age: 60
End: 2025-07-11
Payer: MEDICAID

## 2025-07-11 VITALS
BODY MASS INDEX: 38.45 KG/M2 | RESPIRATION RATE: 18 BRPM | DIASTOLIC BLOOD PRESSURE: 58 MMHG | SYSTOLIC BLOOD PRESSURE: 118 MMHG | WEIGHT: 245 LBS | OXYGEN SATURATION: 97 % | HEART RATE: 47 BPM | HEIGHT: 67 IN

## 2025-07-11 DIAGNOSIS — R00.1 BRADYCARDIA: ICD-10-CM

## 2025-07-11 DIAGNOSIS — I10 ESSENTIAL HYPERTENSION: ICD-10-CM

## 2025-07-11 DIAGNOSIS — I48.91 ATRIAL FIBRILLATION, UNSPECIFIED TYPE (HCC): Primary | ICD-10-CM

## 2025-07-11 DIAGNOSIS — E78.5 DYSLIPIDEMIA: ICD-10-CM

## 2025-07-11 DIAGNOSIS — I48.19 HYPERCOAGULABLE STATE DUE TO PERSISTENT ATRIAL FIBRILLATION (HCC): ICD-10-CM

## 2025-07-11 DIAGNOSIS — D68.69 HYPERCOAGULABLE STATE DUE TO PERSISTENT ATRIAL FIBRILLATION (HCC): ICD-10-CM

## 2025-07-11 LAB — EKG IMPRESSION: NORMAL

## 2025-07-11 PROCEDURE — 99212 OFFICE O/P EST SF 10 MIN: CPT

## 2025-07-11 PROCEDURE — 93010 ELECTROCARDIOGRAM REPORT: CPT | Performed by: INTERNAL MEDICINE

## 2025-07-11 PROCEDURE — 99214 OFFICE O/P EST MOD 30 MIN: CPT

## 2025-07-11 PROCEDURE — 3078F DIAST BP <80 MM HG: CPT

## 2025-07-11 PROCEDURE — 93005 ELECTROCARDIOGRAM TRACING: CPT | Mod: TC

## 2025-07-11 PROCEDURE — 3074F SYST BP LT 130 MM HG: CPT

## 2025-07-11 ASSESSMENT — ENCOUNTER SYMPTOMS
ORTHOPNEA: 0
NEAR-SYNCOPE: 0
PND: 0
PALPITATIONS: 1
LIGHT-HEADEDNESS: 1
SHORTNESS OF BREATH: 0
HEADACHES: 0
DIZZINESS: 1
SYNCOPE: 0
DYSPNEA ON EXERTION: 0

## 2025-07-11 ASSESSMENT — FIBROSIS 4 INDEX: FIB4 SCORE: 1.63

## 2025-07-11 NOTE — PROGRESS NOTES
Chief Complaint   Patient presents with    Follow-Up     Bradycardia    Dyslipidemia    Atrial Fibrillation          Subjective:   Ta Stokes is a 60 y.o. male who presents today for follow-up.     Patient of Dr. Cochran.  Current medical problems include atrial fibrillation, HTN, PVD, DM2, HLD, KAMILAH. Their last clinic visit was 4/30/2025 with Dr. Cochran.    Patient was recently in the hospital from 6/26/2025-6/28/2025 after presenting to the ED after a syncopal episode two weeks prior. He was noted to have bradycardia with heart rate in the 30s. He had been taking amiodarone 200 mg twice a day and bisoprolol for his atrial fibrillation. They held those medications for washout to see improvement in HR. He was discharged with a live cardiac event monitor.     Today's visit:  Patient reports since being discharged from the hospital he has been monitoring his heart rate and at rest he does report his pulse oximeter showing his heart rate down as low as 36. He does get dizzy/lightheaded when his heart rate is low. He reports his heart rate will increase appropriately with exertion. He does report that he will feel his heart fluttering or beating irregularly. He did have this when he was wearing his monitor and did press the trigger button and write down his symptoms. He has been wearing his cardiac event monitor and took it off today to be mailed back. He continues to wear 2 L oxygen continuously and wears a CPAP at night which he is compliant. He denies chest pain, orthopnea, PND, edema, or syncope.     Cardiovascular Risk Factors:  1. Smoking status: Former smoker  2. Type II Diabetes Mellitus: Yes   Lab Results   Component Value Date/Time    HBA1C 6.6 (A) 01/22/2025 02:56 PM    HBA1C 6.7 (H) 07/13/2024 07:25 AM     3. Hypertension: No  4. Dyslipidemia: Yes   Cholesterol,Tot   Date Value Ref Range Status   08/10/2024 153 100 - 199 mg/dL Final     LDL   Date Value Ref Range Status   08/10/2024 73 <100 mg/dL Final     HDL  "  Date Value Ref Range Status   08/10/2024 50 >=40 mg/dL Final     Triglycerides   Date Value Ref Range Status   08/10/2024 152 (H) 0 - 149 mg/dL Final       Past Medical History[1]      Family History   Problem Relation Age of Onset    Hypertension Mother     Cancer Maternal Grandmother          Social History[2]      Allergies[3]      Current Outpatient Medications   Medication Sig    Calcium Carb-Cholecalciferol (CALCIUM + D3 PO) Take 1 Tablet by mouth every morning. (OTC)    Cholecalciferol (VITAMIN D3 PO) Take 2 Tablets by mouth every morning. (OTC)    TRELEGY ELLIPTA 200-62.5-25 MCG/ACT inhaler Inhale 1 Puff every day.    gabapentin (NEURONTIN) 100 MG Cap Take 100 mg by mouth at bedtime. (Patient taking differently: Take 100 mg by mouth at bedtime. Once every 2 days)    ELIQUIS 5 MG Tab Take 1 tablet by mouth twice daily    Empagliflozin (JARDIANCE) 10 MG Tab tablet Take 1 Tablet by mouth every day.    atorvastatin (LIPITOR) 10 MG Tab Take 1 Tablet by mouth every evening.    tamsulosin (FLOMAX) 0.4 MG capsule Take 0.4 mg by mouth at bedtime.         Review of Systems   Constitutional: Negative for malaise/fatigue.   Cardiovascular:  Positive for palpitations. Negative for chest pain, dyspnea on exertion, leg swelling, near-syncope, orthopnea, paroxysmal nocturnal dyspnea and syncope.   Respiratory:  Negative for shortness of breath.    Neurological:  Positive for dizziness and light-headedness. Negative for headaches.           Objective:   /58 (BP Location: Left arm, Patient Position: Sitting, BP Cuff Size: Adult)   Pulse (!) 47   Resp 18   Ht 1.702 m (5' 7\")   Wt 111 kg (245 lb)   SpO2 97%  Body mass index is 38.37 kg/m².         Physical Exam  Constitutional:       General: He is not in acute distress.     Appearance: He is obese.   HENT:      Head: Normocephalic and atraumatic.   Cardiovascular:      Rate and Rhythm: Normal rate and regular rhythm.      Pulses: Normal pulses.      Heart sounds: " No murmur heard.  Pulmonary:      Effort: Pulmonary effort is normal. No respiratory distress.      Breath sounds: Normal breath sounds.   Musculoskeletal:      Right lower leg: No edema.      Left lower leg: No edema.   Neurological:      Mental Status: He is alert and oriented to person, place, and time.      Gait: Gait normal.   Psychiatric:         Behavior: Behavior normal.             Lab Results   Component Value Date/Time    SODIUM 138 06/28/2025 04:49 AM    POTASSIUM 4.5 06/28/2025 04:49 AM    CHLORIDE 107 06/28/2025 04:49 AM    CO2 20 06/28/2025 04:49 AM    GLUCOSE 110 (H) 06/28/2025 04:49 AM    BUN 28 (H) 06/28/2025 04:49 AM    CREATININE 0.96 06/28/2025 04:49 AM      Lab Results   Component Value Date/Time    WBC 4.9 06/27/2025 07:52 AM    RBC 4.73 06/27/2025 07:52 AM    HEMOGLOBIN 14.5 06/27/2025 07:52 AM    HEMATOCRIT 44.9 06/27/2025 07:52 AM    MCV 94.9 06/27/2025 07:52 AM    MCH 30.7 06/27/2025 07:52 AM    MCHC 32.3 06/27/2025 07:52 AM    MPV 9.4 06/27/2025 07:52 AM    NEUTSPOLYS 76.20 (H) 06/27/2025 07:52 AM    LYMPHOCYTES 12.20 (L) 06/27/2025 07:52 AM    MONOCYTES 6.50 06/27/2025 07:52 AM    EOSINOPHILS 3.50 06/27/2025 07:52 AM    BASOPHILS 0.60 06/27/2025 07:52 AM      Lab Results   Component Value Date/Time    CHOLSTRLTOT 153 08/10/2024 07:20 AM    LDL 73 08/10/2024 07:20 AM    HDL 50 08/10/2024 07:20 AM    TRIGLYCERIDE 152 (H) 08/10/2024 07:20 AM       Lab Results   Component Value Date/Time    TROPONINT 8 06/27/2025 0752     Lab Results   Component Value Date/Time    NTPROBNP 277 (H) 06/26/2025 1200     Assessment:   1. Atrial fibrillation, unspecified type (HCC)  - EKG    2. Hypercoagulable state due to persistent atrial fibrillation (HCC)    3. Bradycardia    4. Dyslipidemia    5. Essential hypertension        Medical Decision Making:  Today's Assessment / Plan:   Paroxysmal Atrial Fibrillation  Hypercoagulable state due to atrial fibrillation  Bradycardia  -EKG in office today shows sinus  bradycardia rate 56  -Continue eliquis 5 mg twice a day for stroke prevention. CHADs-VASc score 2 (HTN and DM)  -stopped beta blocker and amiodarone due to symptomatic bradycardia. Patient reporting heart rate still low at home at rest with symptoms. He took off his cardiac event monitor today that we mailed back for him. Discussed that amiodarone can take up to 8 weeks to get out of his symptoms. Will continue to monitor heart rate at home and pending results of monitor will follow up for further recommendations such as a referral to EP for AAD vs ablation.     Hypertension  - Good control  - not currently on medication for blood pressure  - goal < 130/80  -continue to monitor blood pressure at home and keep a log    Hyperlipidemia  -Most recent LDL 73  -Continue atorvastatin 10 mg every evening  -Goal of less than 100  -Check lipid panel in 3 months, order at next follow up    Return in about 3 months (around 10/11/2025) for Catalina MONTELONGO.  Sooner if problems.    Catalina Nelson A.P.R.NPranav            [1]   Past Medical History:  Diagnosis Date    Arrhythmia     A-fib    Cancer (HCC) 2024    prostate cancer    Dental disorder     upper/lower dentures    Dyslipidemia     GERD (gastroesophageal reflux disease)     Heart burn     High cholesterol     Hypertension     Indigestion     Sleep apnea     Snoring    [2]   Social History  Tobacco Use    Smoking status: Former     Current packs/day: 0.00     Average packs/day: 1 pack/day for 39.0 years (39.0 ttl pk-yrs)     Types: Cigarettes     Start date: 1979     Quit date: 2018     Years since quittin.5    Smokeless tobacco: Never    Tobacco comments:     1ppd   Vaping Use    Vaping status: Never Used   Substance Use Topics    Alcohol use: Yes     Comment: once in awhile, 1 beer    Drug use: No   [3]   Allergies  Allergen Reactions    Metformin Diarrhea

## 2025-07-23 ENCOUNTER — TELEPHONE (OUTPATIENT)
Dept: CARDIOLOGY | Facility: MEDICAL CENTER | Age: 60
End: 2025-07-23
Payer: MEDICAID

## 2025-07-23 DIAGNOSIS — I48.91 ATRIAL FIBRILLATION, UNSPECIFIED TYPE (HCC): Primary | ICD-10-CM

## 2025-07-23 NOTE — TELEPHONE ENCOUNTER
Urgent Zio AT EOS Final Report to 's nurse, Fritz on 7/23/2025  MD notification criteria for first documentation of atrial flutter - report posted prior.

## 2025-07-23 NOTE — TELEPHONE ENCOUNTER
"To TT: Cardiac monitor EOS. Please read and sign. Thank you.   To JG: Cardiac monitor EOS. Please advise. Thank you.     Phone number called: [phone]    Call outcome: Pt still complaints of dizziness upon standing, SOB with little exertion. Pt states that, \"I can feel my heart fluttering most specially at night that only couple seconds then goes away\", pt also reports tension on the base of the his throat. Pt unable to provide BP/HR recording.  "

## 2025-07-23 NOTE — TELEPHONE ENCOUNTER
EBONIE Donato. to JESUS Aragon  Ethyl Santa Quijano R.N.  (Selected Message)      7/23/25  4:25 PM  Recommend referral to EP for a flutter ablation.  Defer to HL who saw him most recently on 7/11 for clinical correlation    Noted. Thank you.

## 2025-08-27 ENCOUNTER — TELEPHONE (OUTPATIENT)
Dept: HEALTH INFORMATION MANAGEMENT | Facility: OTHER | Age: 60
End: 2025-08-27
Payer: MEDICAID